# Patient Record
Sex: MALE | Race: BLACK OR AFRICAN AMERICAN | NOT HISPANIC OR LATINO | Employment: OTHER | URBAN - METROPOLITAN AREA
[De-identification: names, ages, dates, MRNs, and addresses within clinical notes are randomized per-mention and may not be internally consistent; named-entity substitution may affect disease eponyms.]

---

## 2018-06-15 ENCOUNTER — TELEPHONE (OUTPATIENT)
Dept: RHEUMATOLOGY | Facility: CLINIC | Age: 63
End: 2018-06-15

## 2018-06-15 NOTE — TELEPHONE ENCOUNTER
----- Message from Cecily Smith sent at 6/15/2018  4:57 PM CDT -----  Contact: self  Patient presented to  to schedule an appt with Dr. Sanford, I was not able to get him an appointment, patient would like a call to have one scheduled for him.    Please call [phone]

## 2018-07-31 ENCOUNTER — TELEPHONE (OUTPATIENT)
Dept: RHEUMATOLOGY | Facility: CLINIC | Age: 63
End: 2018-07-31

## 2018-07-31 NOTE — TELEPHONE ENCOUNTER
----- Message from Anum Gutierrez sent at 7/27/2018  9:57 AM CDT -----  Contact: daughter   Daughter - Renetta John - [phone] is calling to schedule an appt with Dr Sanford/First she said patient has never seen Dr Sanford and when I advised daughter that Dr Sanford was not accepting new patient's at this time she said he just saw her a few weeks ago but I do not show this/please advise

## 2018-07-31 NOTE — TELEPHONE ENCOUNTER
----- Message from Jennifer Dennis sent at 7/30/2018 10:00 AM CDT -----    Pt  Daughter garima  Is calling to  Speak to the  Nurse  Pt daughter  Has  questions // call [phone]

## 2018-09-19 ENCOUNTER — OFFICE VISIT (OUTPATIENT)
Dept: RHEUMATOLOGY | Facility: CLINIC | Age: 63
End: 2018-09-19
Payer: COMMERCIAL

## 2018-09-19 VITALS
BODY MASS INDEX: 27.52 KG/M2 | HEIGHT: 76 IN | WEIGHT: 226 LBS | DIASTOLIC BLOOD PRESSURE: 78 MMHG | SYSTOLIC BLOOD PRESSURE: 125 MMHG | HEART RATE: 87 BPM

## 2018-09-19 DIAGNOSIS — M54.16 LUMBAR RADICULOPATHY: ICD-10-CM

## 2018-09-19 DIAGNOSIS — B02.8 HERPES ZOSTER WITH COMPLICATION: ICD-10-CM

## 2018-09-19 DIAGNOSIS — M47.816 LUMBAR SPONDYLOSIS: ICD-10-CM

## 2018-09-19 DIAGNOSIS — M51.36 DDD (DEGENERATIVE DISC DISEASE), LUMBAR: Primary | ICD-10-CM

## 2018-09-19 PROCEDURE — 3008F BODY MASS INDEX DOCD: CPT | Mod: CPTII,S$GLB,, | Performed by: INTERNAL MEDICINE

## 2018-09-19 PROCEDURE — 96372 THER/PROPH/DIAG INJ SC/IM: CPT | Mod: S$GLB,,, | Performed by: INTERNAL MEDICINE

## 2018-09-19 PROCEDURE — 99999 PR PBB SHADOW E&M-EST. PATIENT-LVL IV: CPT | Mod: PBBFAC,,, | Performed by: INTERNAL MEDICINE

## 2018-09-19 PROCEDURE — 99205 OFFICE O/P NEW HI 60 MIN: CPT | Mod: 25,S$GLB,, | Performed by: INTERNAL MEDICINE

## 2018-09-19 RX ORDER — VALACYCLOVIR HYDROCHLORIDE 1 G/1
1000 TABLET, FILM COATED ORAL 3 TIMES DAILY
Qty: 21 TABLET | Refills: 3 | Status: SHIPPED | OUTPATIENT
Start: 2018-09-19 | End: 2019-02-15

## 2018-09-19 RX ORDER — KETOROLAC TROMETHAMINE 30 MG/ML
60 INJECTION, SOLUTION INTRAMUSCULAR; INTRAVENOUS
Status: COMPLETED | OUTPATIENT
Start: 2018-09-19 | End: 2018-09-19

## 2018-09-19 RX ORDER — ANASTROZOLE 1 MG/1
1 TABLET ORAL
COMMUNITY
Start: 2018-05-02

## 2018-09-19 RX ORDER — MONTELUKAST SODIUM 10 MG/1
10 TABLET ORAL
COMMUNITY
Start: 2017-05-17

## 2018-09-19 RX ORDER — CELECOXIB 200 MG/1
200 CAPSULE ORAL DAILY
Qty: 30 CAPSULE | Refills: 5 | Status: SHIPPED | OUTPATIENT
Start: 2018-09-19 | End: 2018-10-19

## 2018-09-19 RX ORDER — METHYLPREDNISOLONE ACETATE 80 MG/ML
80 INJECTION, SUSPENSION INTRA-ARTICULAR; INTRALESIONAL; INTRAMUSCULAR; SOFT TISSUE
Status: COMPLETED | OUTPATIENT
Start: 2018-09-19 | End: 2018-09-19

## 2018-09-19 RX ORDER — CYCLOBENZAPRINE HCL 5 MG
5 TABLET ORAL
COMMUNITY
Start: 2017-06-20 | End: 2020-07-27

## 2018-09-19 RX ORDER — OMEPRAZOLE 40 MG/1
40 CAPSULE, DELAYED RELEASE ORAL EVERY MORNING
COMMUNITY
Start: 2018-09-07

## 2018-09-19 RX ORDER — TESTOSTERONE CYPIONATE 200 MG/ML
200 INJECTION, SOLUTION INTRAMUSCULAR
COMMUNITY
Start: 2017-03-14 | End: 2021-08-19

## 2018-09-19 RX ORDER — GABAPENTIN 300 MG/1
CAPSULE ORAL
COMMUNITY
Start: 2018-09-10 | End: 2019-02-26

## 2018-09-19 RX ADMIN — KETOROLAC TROMETHAMINE 60 MG: 30 INJECTION, SOLUTION INTRAMUSCULAR; INTRAVENOUS at 12:09

## 2018-09-19 RX ADMIN — METHYLPREDNISOLONE ACETATE 80 MG: 80 INJECTION, SUSPENSION INTRA-ARTICULAR; INTRALESIONAL; INTRAMUSCULAR; SOFT TISSUE at 12:09

## 2018-09-19 NOTE — PROGRESS NOTES
Subjective:       Patient ID: Tera Marquez is a 62 y.o. male.    Chief Complaint: Consult    HPI:  The patient is a 62-year-old man with a history of osteoarthritis lumbar sacral arthritis hip replacement patient has a history of epiglottis hiatus initial history and family history negative for osteoarthritis rheumatoid arthritis inflammatory arthritis family history is not significant social history is not significant except the patient is wife  approximately 6 months ago but only      Disease Management HPI      Osteoarthritis    The disease course has been fluctuating.     He complains of pain, stiffness and joint swelling. The symptoms have been worsening. Affected locations include the neck, left shoulder, right shoulder, left hip, left knee, right hip and right knee. Associated symptoms include pain at night, pain while resting and myalgias. Pertinent negatives include no dysuria, fatigue, fever, trouble swallowing or headaches. He complains of morning stiffness lasting less than 30 minutes after awakening.The neck, left hip and right hip presents with Arthralgia.    Past treatments include NSAIDs. The treatment provided mild relief. Factors aggravating his arthritis include activity.     Review of Systems   Constitutional: Negative for activity change, appetite change, chills, diaphoresis, fatigue, fever and unexpected weight change.   HENT: Negative for congestion, ear pain, facial swelling, mouth sores, nosebleeds, postnasal drip, rhinorrhea, sinus pressure, sneezing, sore throat, tinnitus, trouble swallowing and voice change.    Eyes: Negative for pain, discharge, redness, itching and visual disturbance.   Respiratory: Negative for apnea, cough, chest tightness, shortness of breath and wheezing.    Cardiovascular: Negative for chest pain, palpitations and leg swelling.   Gastrointestinal: Negative for abdominal pain, constipation, diarrhea, nausea and vomiting.   Endocrine: Negative for cold  "intolerance, heat intolerance, polydipsia and polyuria.   Genitourinary: Negative for decreased urine volume, difficulty urinating, dysuria, flank pain, frequency, hematuria and urgency.   Musculoskeletal: Positive for back pain, gait problem, joint swelling, myalgias, neck pain, neck stiffness and stiffness. Negative for arthralgias.   Skin: Positive for rash. Negative for pallor and wound.   Allergic/Immunologic: Negative for immunocompromised state.   Neurological: Negative for dizziness, tremors, seizures, syncope, weakness, numbness and headaches.   Hematological: Negative for adenopathy. Does not bruise/bleed easily.   Psychiatric/Behavioral: Negative for sleep disturbance and suicidal ideas. The patient is not nervous/anxious.          Objective:   /78   Pulse 87   Ht 6' 3.5" (1.918 m)   Wt 102.5 kg (225 lb 15.5 oz)   BMI 27.87 kg/m²      Physical Exam   Vitals reviewed.  Constitutional: He is oriented to person, place, and time and well-developed, well-nourished, and in no distress.   HENT:   Head: Normocephalic and atraumatic.   Mouth/Throat: Oropharynx is clear and moist.   Eyes: EOM are normal. Pupils are equal, round, and reactive to light.   Neck: Neck supple. No thyromegaly present.   Cardiovascular: Normal rate, regular rhythm and normal heart sounds.  Exam reveals no gallop and no friction rub.    No murmur heard.  Pulmonary/Chest: Breath sounds normal. He has no wheezes. He has no rales. He exhibits no tenderness.   Abdominal: There is no tenderness. There is no rebound and no guarding.       Right Side Rheumatological Exam     The patient is tender to palpation of the shoulder, elbow, wrist, knee, 1st PIP, 1st MCP, 2nd PIP, 2nd MCP, 3rd PIP, 3rd MCP, 4th PIP, 4th MCP and 5th PIP    He has swelling of the elbow, wrist, knee, 1st PIP, 1st MCP, 2nd PIP, 2nd MCP, 3rd PIP, 3rd MCP, 4th PIP, 4th MCP, 5th PIP and 5th MCP    The patient has an enlarged wrist and knee    Shoulder Exam   Tenderness " Location: no tenderness    Range of Motion   Active abduction: abnormal   Adduction: abnormal  Sensation: normal    Knee Exam   Tenderness Location: medial joint line and LCL  Patellofemoral Crepitus: positive  Effusion: positive  Sensation: normal    Hip Exam   Tenderness Location: posterior and anterior  Sensation: normal    Elbow/Wrist Exam   Tenderness Location: no tenderness  Sensation: normal    Left Side Rheumatological Exam     The patient is tender to palpation of the shoulder, elbow, wrist, knee, 1st PIP, 1st MCP, 2nd PIP, 2nd MCP, 3rd PIP, 3rd MCP, 4th PIP, 4th MCP, 5th PIP and 5th MCP.    He has swelling of the wrist, knee, 1st PIP, 1st MCP, 2nd PIP, 2nd MCP, 3rd PIP, 3rd MCP, 4th PIP, 4th MCP, 5th PIP and 5th MCP    The patient has an enlarged knee.    Shoulder Exam   Tenderness Location: no tenderness    Range of Motion   Active abduction: abnormal   Sensation: normal    Knee Exam   Tenderness Location: lateral joint line and medial joint line    Patellofemoral Crepitus: positive  Effusion: positive  Sensation: normal    Hip Exam   Tenderness Location: posterior and anterior  Sensation: normal    Elbow/Wrist Exam   Sensation: normal      Back/Neck Exam   General Inspection   Gait: normal       Tenderness Right paramedian tenderness of the Upper C-Spine, Lower C-Spine, Lower L-Spine and SI Joint.Left paramedian tenderness of the Upper C-Spine, Lower L-Spine, Lower C-Spine and SI Joint.    Neck Range of Motion   Flexion: Limited  Extension: Limited  Lymphadenopathy:     He has no cervical adenopathy.   Neurological: He is alert and oriented to person, place, and time. Gait normal.   Skin: No rash noted. No erythema. No pallor.     Psychiatric: Mood and affect normal.   Musculoskeletal: He exhibits tenderness and deformity. He exhibits no edema.           Assessment:       1. DDD (degenerative disc disease), lumbar    2. Lumbar radiculopathy    3. Lumbar spondylosis    4. Herpes zoster with complication             Plan:       Tera was seen today for consult.    Diagnoses and all orders for this visit:    DDD (degenerative disc disease), lumbar  -     IgA; Future  -     IgG; Future  -     IgG 1, 2, 3, and 4; Future  -     IgM; Future  -     WHIT; Future  -     Cyclic citrul peptide antibody, IgG; Future  -     C-reactive protein; Future  -     Sedimentation rate; Future  -     Rheumatoid factor; Future  -     TSH; Future  -     T4, free; Future  -     T3, free; Future  -     Lymphocyte Profile II; Future  -     Varicella zoster antibody, IgG; Future  -     Varicella zoster antibody, IgM; Future  -     IgA  -     IgG  -     IgG 1, 2, 3, and 4  -     IgM  -     WHIT  -     Cyclic citrul peptide antibody, IgG  -     C-reactive protein  -     Sedimentation rate  -     Rheumatoid factor  -     TSH  -     T4, free  -     T3, free  -     Lymphocyte Profile II  -     Varicella zoster antibody, IgG  -     Varicella zoster antibody, IgM  -     methylPREDNISolone acetate injection 80 mg; Inject 1 mL (80 mg total) into the muscle one time.  -     ketorolac injection 60 mg; Inject 2 mLs (60 mg total) into the muscle one time.    Lumbar radiculopathy  -     IgA; Future  -     IgG; Future  -     IgG 1, 2, 3, and 4; Future  -     IgM; Future  -     WHIT; Future  -     Cyclic citrul peptide antibody, IgG; Future  -     C-reactive protein; Future  -     Sedimentation rate; Future  -     Rheumatoid factor; Future  -     TSH; Future  -     T4, free; Future  -     T3, free; Future  -     Lymphocyte Profile II; Future  -     Varicella zoster antibody, IgG; Future  -     Varicella zoster antibody, IgM; Future  -     IgA  -     IgG  -     IgG 1, 2, 3, and 4  -     IgM  -     WHIT  -     Cyclic citrul peptide antibody, IgG  -     C-reactive protein  -     Sedimentation rate  -     Rheumatoid factor  -     TSH  -     T4, free  -     T3, free  -     Lymphocyte Profile II  -     Varicella zoster antibody, IgG  -     Varicella zoster antibody,  IgM  -     methylPREDNISolone acetate injection 80 mg; Inject 1 mL (80 mg total) into the muscle one time.  -     ketorolac injection 60 mg; Inject 2 mLs (60 mg total) into the muscle one time.    Lumbar spondylosis  -     IgA; Future  -     IgG; Future  -     IgG 1, 2, 3, and 4; Future  -     IgM; Future  -     WHIT; Future  -     Cyclic citrul peptide antibody, IgG; Future  -     C-reactive protein; Future  -     Sedimentation rate; Future  -     Rheumatoid factor; Future  -     TSH; Future  -     T4, free; Future  -     T3, free; Future  -     Lymphocyte Profile II; Future  -     Varicella zoster antibody, IgG; Future  -     Varicella zoster antibody, IgM; Future  -     IgA  -     IgG  -     IgG 1, 2, 3, and 4  -     IgM  -     WHIT  -     Cyclic citrul peptide antibody, IgG  -     C-reactive protein  -     Sedimentation rate  -     Rheumatoid factor  -     TSH  -     T4, free  -     T3, free  -     Lymphocyte Profile II  -     Varicella zoster antibody, IgG  -     Varicella zoster antibody, IgM  -     methylPREDNISolone acetate injection 80 mg; Inject 1 mL (80 mg total) into the muscle one time.  -     ketorolac injection 60 mg; Inject 2 mLs (60 mg total) into the muscle one time.    Herpes zoster with complication  -     IgA; Future  -     IgG; Future  -     IgG 1, 2, 3, and 4; Future  -     IgM; Future  -     WHIT; Future  -     Cyclic citrul peptide antibody, IgG; Future  -     C-reactive protein; Future  -     Sedimentation rate; Future  -     Rheumatoid factor; Future  -     TSH; Future  -     T4, free; Future  -     T3, free; Future  -     Lymphocyte Profile II; Future  -     Varicella zoster antibody, IgG; Future  -     Varicella zoster antibody, IgM; Future  -     IgA  -     IgG  -     IgG 1, 2, 3, and 4  -     IgM  -     WHIT  -     Cyclic citrul peptide antibody, IgG  -     C-reactive protein  -     Sedimentation rate  -     Rheumatoid factor  -     TSH  -     T4, free  -     T3, free  -     Lymphocyte  Profile II  -     Varicella zoster antibody, IgG  -     Varicella zoster antibody, IgM  -     methylPREDNISolone acetate injection 80 mg; Inject 1 mL (80 mg total) into the muscle one time.  -     ketorolac injection 60 mg; Inject 2 mLs (60 mg total) into the muscle one time.    Other orders  -     celecoxib (CELEBREX) 200 MG capsule; Take 1 capsule (200 mg total) by mouth once daily.  -     valACYclovir (VALTREX) 1000 MG tablet; Take 1 tablet (1,000 mg total) by mouth 3 (three) times daily.

## 2018-09-19 NOTE — PROGRESS NOTES
Administered 1 cc ( 80 mg/ml ) of depomedrol to the right upper outer gluteal. Informed of s/s to report verbalized understanding. No adverse reactions noted.    Lot # [id number]  Expiration 08/19    Administered 2 cc ( 30 mg/ml ) of toradol to the left upper outer gluteal. Informed of s/s to report verbalized understanding. No adverse reactions noted.    Lot # [id number]  Expiration 1 dec 2019

## 2018-09-25 ENCOUNTER — TELEPHONE (OUTPATIENT)
Dept: RHEUMATOLOGY | Facility: CLINIC | Age: 63
End: 2018-09-25

## 2018-09-25 DIAGNOSIS — M51.36 DDD (DEGENERATIVE DISC DISEASE), LUMBAR: Primary | ICD-10-CM

## 2018-09-25 DIAGNOSIS — M54.16 LUMBAR RADICULOPATHY: ICD-10-CM

## 2018-09-25 DIAGNOSIS — M47.816 LUMBAR SPONDYLOSIS: ICD-10-CM

## 2018-09-25 NOTE — TELEPHONE ENCOUNTER
Clarified lab order for lymphocyte panel. Needed it ordered for [facility] send out [id number] the test that was previously ordered is no longer available.

## 2018-09-25 NOTE — TELEPHONE ENCOUNTER
----- Message from Jerald Pabon sent at 9/25/2018  9:42 AM CDT -----  Type: Needs Medical Advice    Who Called:  [place] [facility]  Best Call Back Number: [phone]  Additional Information: Need order clarified

## 2019-02-26 ENCOUNTER — OFFICE VISIT (OUTPATIENT)
Dept: RHEUMATOLOGY | Facility: CLINIC | Age: 64
End: 2019-02-26
Payer: COMMERCIAL

## 2019-02-26 VITALS
HEIGHT: 75 IN | SYSTOLIC BLOOD PRESSURE: 138 MMHG | HEART RATE: 86 BPM | BODY MASS INDEX: 28.66 KG/M2 | DIASTOLIC BLOOD PRESSURE: 83 MMHG | WEIGHT: 230.5 LBS

## 2019-02-26 DIAGNOSIS — M51.36 DDD (DEGENERATIVE DISC DISEASE), LUMBAR: Primary | ICD-10-CM

## 2019-02-26 DIAGNOSIS — M47.816 LUMBAR SPONDYLOSIS: ICD-10-CM

## 2019-02-26 DIAGNOSIS — M54.16 LUMBAR RADICULOPATHY: ICD-10-CM

## 2019-02-26 PROCEDURE — 96372 PR INJECTION,THERAP/PROPH/DIAG2ST, IM OR SUBCUT: ICD-10-PCS | Mod: S$GLB,,, | Performed by: INTERNAL MEDICINE

## 2019-02-26 PROCEDURE — 3008F BODY MASS INDEX DOCD: CPT | Mod: CPTII,S$GLB,, | Performed by: INTERNAL MEDICINE

## 2019-02-26 PROCEDURE — 3008F PR BODY MASS INDEX (BMI) DOCUMENTED: ICD-10-PCS | Mod: CPTII,S$GLB,, | Performed by: INTERNAL MEDICINE

## 2019-02-26 PROCEDURE — 99999 PR PBB SHADOW E&M-EST. PATIENT-LVL III: CPT | Mod: PBBFAC,,, | Performed by: INTERNAL MEDICINE

## 2019-02-26 PROCEDURE — 96372 THER/PROPH/DIAG INJ SC/IM: CPT | Mod: S$GLB,,, | Performed by: INTERNAL MEDICINE

## 2019-02-26 PROCEDURE — 99999 PR PBB SHADOW E&M-EST. PATIENT-LVL III: ICD-10-PCS | Mod: PBBFAC,,, | Performed by: INTERNAL MEDICINE

## 2019-02-26 PROCEDURE — 99214 PR OFFICE/OUTPT VISIT, EST, LEVL IV, 30-39 MIN: ICD-10-PCS | Mod: 25,S$GLB,, | Performed by: INTERNAL MEDICINE

## 2019-02-26 PROCEDURE — 99214 OFFICE O/P EST MOD 30 MIN: CPT | Mod: 25,S$GLB,, | Performed by: INTERNAL MEDICINE

## 2019-02-26 RX ORDER — METHYLPREDNISOLONE ACETATE 80 MG/ML
160 INJECTION, SUSPENSION INTRA-ARTICULAR; INTRALESIONAL; INTRAMUSCULAR; SOFT TISSUE
Status: DISCONTINUED | OUTPATIENT
Start: 2019-02-26 | End: 2019-02-26

## 2019-02-26 RX ORDER — KETOROLAC TROMETHAMINE 30 MG/ML
60 INJECTION, SOLUTION INTRAMUSCULAR; INTRAVENOUS
Status: COMPLETED | OUTPATIENT
Start: 2019-02-26 | End: 2019-02-26

## 2019-02-26 RX ORDER — ETODOLAC 500 MG/1
500 TABLET, FILM COATED ORAL 2 TIMES DAILY
Qty: 60 TABLET | Refills: 6 | Status: SHIPPED | OUTPATIENT
Start: 2019-02-26 | End: 2020-01-21

## 2019-02-26 RX ORDER — METHYLPREDNISOLONE ACETATE 80 MG/ML
80 INJECTION, SUSPENSION INTRA-ARTICULAR; INTRALESIONAL; INTRAMUSCULAR; SOFT TISSUE
Status: COMPLETED | OUTPATIENT
Start: 2019-02-26 | End: 2019-02-26

## 2019-02-26 RX ADMIN — METHYLPREDNISOLONE ACETATE 80 MG: 80 INJECTION, SUSPENSION INTRA-ARTICULAR; INTRALESIONAL; INTRAMUSCULAR; SOFT TISSUE at 12:02

## 2019-02-26 RX ADMIN — KETOROLAC TROMETHAMINE 60 MG: 30 INJECTION, SOLUTION INTRAMUSCULAR; INTRAVENOUS at 12:02

## 2019-02-26 ASSESSMENT — ROUTINE ASSESSMENT OF PATIENT INDEX DATA (RAPID3)
PATIENT GLOBAL ASSESSMENT SCORE: 5.5
PAIN SCORE: 5.5
PSYCHOLOGICAL DISTRESS SCORE: 2.2
TOTAL RAPID3 SCORE: 4.78
MDHAQ FUNCTION SCORE: 1

## 2019-02-26 NOTE — PROGRESS NOTES
"Subjective:       Patient ID: Tera Marquez is a 63 y.o. male.    Chief Complaint: Follow-up    HPI:  The patient is a 62-year-old man with a history of osteoarthritis lumbar sacral arthritis hip replacement patient has a history of epiglottis hiatus initial history and family history negative for osteoarthritis rheumatoid arthritis inflammatory arthritis family history is not significant social history is not significant except the patient is wife  approximately 6 months ago but only      Review of Systems   Constitutional: Negative for activity change, appetite change, chills, diaphoresis and unexpected weight change.   HENT: Negative for congestion, ear pain, facial swelling, mouth sores, nosebleeds, postnasal drip, rhinorrhea, sinus pressure, sneezing, sore throat, tinnitus and voice change.    Eyes: Negative for pain, discharge, redness, itching and visual disturbance.   Respiratory: Negative for apnea, cough, chest tightness, shortness of breath and wheezing.    Cardiovascular: Negative for chest pain, palpitations and leg swelling.   Gastrointestinal: Negative for abdominal pain, constipation, diarrhea, nausea and vomiting.   Endocrine: Negative for cold intolerance, heat intolerance, polydipsia and polyuria.   Genitourinary: Negative for decreased urine volume, difficulty urinating, flank pain, frequency, hematuria and urgency.   Musculoskeletal: Positive for back pain, gait problem, neck pain and neck stiffness. Negative for arthralgias.   Skin: Positive for rash. Negative for pallor and wound.   Allergic/Immunologic: Negative for immunocompromised state.   Neurological: Negative for dizziness, tremors, seizures, syncope, weakness and numbness.   Hematological: Negative for adenopathy. Does not bruise/bleed easily.   Psychiatric/Behavioral: Negative for sleep disturbance and suicidal ideas. The patient is not nervous/anxious.          Objective:   /83   Pulse 86   Ht 6' 3" (1.905 m)   Wt 104.5 " kg (230 lb 7.9 oz)   BMI 28.81 kg/m²      Physical Exam   Vitals reviewed.  Constitutional: He is oriented to person, place, and time and well-developed, well-nourished, and in no distress.   HENT:   Head: Normocephalic and atraumatic.   Mouth/Throat: Oropharynx is clear and moist.   Eyes: EOM are normal. Pupils are equal, round, and reactive to light.   Neck: Neck supple. No thyromegaly present.   Cardiovascular: Normal rate, regular rhythm and normal heart sounds.  Exam reveals no gallop and no friction rub.    No murmur heard.  Pulmonary/Chest: Breath sounds normal. He has no wheezes. He has no rales. He exhibits no tenderness.   Abdominal: There is no tenderness. There is no rebound and no guarding.       Right Side Rheumatological Exam     The patient is tender to palpation of the shoulder, elbow, wrist, knee, 1st PIP, 1st MCP, 2nd PIP, 2nd MCP, 3rd PIP, 3rd MCP, 4th PIP, 4th MCP and 5th PIP    He has swelling of the elbow, wrist, knee, 1st PIP, 1st MCP, 2nd PIP, 2nd MCP, 3rd PIP, 3rd MCP, 4th PIP, 4th MCP, 5th PIP and 5th MCP    The patient has an enlarged wrist and knee    Shoulder Exam   Tenderness Location: no tenderness    Range of Motion   Active abduction: abnormal   Adduction: abnormal  Sensation: normal    Knee Exam   Tenderness Location: medial joint line and LCL  Patellofemoral Crepitus: positive  Effusion: positive  Sensation: normal    Hip Exam   Tenderness Location: posterior and anterior  Sensation: normal    Elbow/Wrist Exam   Tenderness Location: no tenderness  Sensation: normal    Left Side Rheumatological Exam     The patient is tender to palpation of the shoulder, elbow, wrist, knee, 1st PIP, 1st MCP, 2nd PIP, 2nd MCP, 3rd PIP, 3rd MCP, 4th PIP, 4th MCP, 5th PIP and 5th MCP.    He has swelling of the wrist, knee, 1st PIP, 1st MCP, 2nd PIP, 2nd MCP, 3rd PIP, 3rd MCP, 4th PIP, 4th MCP, 5th PIP and 5th MCP    The patient has an enlarged knee.    Shoulder Exam   Tenderness Location: no  tenderness    Range of Motion   Active abduction: abnormal   Sensation: normal    Knee Exam   Tenderness Location: lateral joint line and medial joint line    Patellofemoral Crepitus: positive  Effusion: positive  Sensation: normal    Hip Exam   Tenderness Location: posterior and anterior  Sensation: normal    Elbow/Wrist Exam   Sensation: normal      Back/Neck Exam   General Inspection   Gait: normal       Tenderness Right paramedian tenderness of the Upper C-Spine, Lower C-Spine, Lower L-Spine and SI Joint.Left paramedian tenderness of the Upper C-Spine, Lower L-Spine, Lower C-Spine and SI Joint.    Neck Range of Motion   Flexion: Limited  Extension: Limited  Lymphadenopathy:     He has no cervical adenopathy.   Neurological: He is alert and oriented to person, place, and time. Gait normal.   Skin: No rash noted. No erythema. No pallor.     Psychiatric: Mood and affect normal.   Musculoskeletal: He exhibits tenderness and deformity. He exhibits no edema.           Performing Organization Address City/[facility] Phone Number   [place]             Back to top of Lab Results       Sedimentation rate, automated (09/25/2018 10:38 AM CDT)  Sedimentation rate, automated (09/25/2018 10:38 AM CDT)   Component Value Ref Range Performed At [facility]   Sed Rate 1 0 - 20         Sedimentation rate, automated (09/25/2018 10:38 AM CDT)   Specimen   Blood - Blood     Sedimentation rate, automated (09/25/2018 10:38 AM CDT)   Performing Organization Address City/[facility] Phone Number   [place]             Back to top of Lab Results       Cyclic citrul peptide antibody, IgG (09/25/2018 10:38 AM CDT)  Cyclic citrul peptide antibody, IgG (09/25/2018 10:38 AM CDT)   Component Value Ref Range Performed At [facility]   Test Requested see belowComment: CYCLIC CITRULLINE PEPTIDE (CCP) IGG         CCP, Ab IGG <16  Comment:   Test Performed By:  [facility]                      [place], [place]   NEGATIVE:` <20`WEAK POSITIVE:` 20-39`MODERATE POSITIVE:` 40-59`STRONG POSITIVE` >59         Cyclic citrul peptide antibody, IgG (09/25/2018 10:38 AM CDT)   Specimen   Blood - Blood     Cyclic citrul peptide antibody, IgG (09/25/2018 10:38 AM CDT)   Performing Organization Address City/[facility] Phone Number   [place]             Back to top of Lab Results       WHIT (09/25/2018 10:38 AM CDT)  WHIT (09/25/2018 10:38 AM CDT)   Component Value Ref Range Performed At [facility]   Test Requested see belowComment: ANTINUCLEAR ANTIBODY, TITER AND PATTERN NEGATIVE       WHIT NEGATIVE  Comment:   WHIT IFA is a first line screen for detecting the presence of  up to approximately 150 autoantibodies in various autoimmune  diseases. A negative WHIT IFA result suggests WHIT-associated  autoimmune diseases are not present at this time.  Visit Physician FAQs for interpretation of all antibodies in  the [place], prevalence, and association with diseases at  http://education.[employer]/faq/CHH738   NEGATIVE       WHIT Titer SEE BELOW  Comment:   Test Not Performed. Reflex testing not required.  Reference Ranges for Anti-Nuclear Ab Titer:     <1:40      Negative     1:40-1:80  Low Antibody Level     >1:80      Elevated Antibody Level           WHIT Pattern SEE BELOW  Comment:   Test Not Performed. Reflex testing not required.  Test Performed By:  [employer]             WHIT (09/25/2018 10:38 AM CDT)   Specimen   Blood - Blood     WHIT (09/25/2018 10:38 AM CDT)   Performing Organization Address City/State/[facility] Phone Number   [place]             Back to top of Lab Results       Varicella zoster antibody, IgG (09/25/2018 10:37 AM CDT)  Varicella zoster antibody, IgG (09/25/2018 10:37 AM CDT)   Component Value Ref Range Performed At [facility]   Test Requested see belowComment: VARICELLA ZOSTER VIRUS ANTIBODY  (IgG)         Varicella Zoster Virus Ab, IgG 1,167.00 (H)  Comment:   Index              Interpretation  <135.00           Negative - Antibody not detected    135.00 - 164.99  Equivocal  >=165.00           Positive - Antibody detected  A positive result indicates that the patient has  antibody to VZV but does not differentiate between  an active or past infection. The clinical diagnosis  must be interpreted in conjunction with the clinical  signs and symptoms of the patient. This assay reliably  measures immunity due to previous infection but  may not be sensitive enough to detect antibodies  induced by vaccination. Thus, a negative result in  a vaccinated individual does not necessarily  indicate susceptibility to VZV infection.       This test performed at:       [facility]       [id number] [place] [id number]       Director: LINDA Das MD  Test Performed By:  [place]             Varicella zoster antibody, IgG (09/25/2018 10:37 AM CDT)   Specimen   Blood - Blood     Varicella zoster antibody, IgG (09/25/2018 10:37 AM CDT)   Performing Organization Address City/[facility] Number   [place]             Back to top of Lab Results       T3, free (09/25/2018 10:37 AM CDT)  T3, free (09/25/2018 10:37 AM CDT)   Component Value Ref Range Performed At [facility]   Free T3 7.78 (H)  Comment:   Reference Range changed due to Reagent formulation design  change by Vendor.   2.90 - 4.20 pg/mL         T3, free (09/25/2018 10:37 AM CDT)   Specimen   Blood - Blood     T3, free (09/25/2018 10:37 AM CDT)   Performing Organization Address City/[facility] Number   [place]             Back to top of Lab Results       T4, free (09/25/2018 10:37 AM CDT)  T4, free (09/25/2018 10:37 AM CDT)   Component Value Ref Range Performed At [facility]   T4, Free 1.18  0.58 - 1.34 ng/dL         T4, free (09/25/2018 10:37 AM CDT)   Specimen   Blood - Blood     T4, free (09/25/2018 10:37 AM CDT)   Performing Organization Address City/[facility] Phone Number   [place]             Back to top of Lab Results       TSH (09/25/2018 10:37 AM CDT)  TSH (09/25/2018 10:37 AM CDT)   Component Value Ref Range Performed At [facility]   TSH 1.08 0.34 - 5.60         TSH (09/25/2018 10:37 AM CDT)   Specimen   Blood - Blood     TSH (09/25/2018 10:37 AM CDT)   Performing Organization Address City/State/[facility] Phone Number   [place]             Back to top of Lab Results       RA Latex (09/25/2018 10:37 AM CDT)  RA Latex (09/25/2018 10:37 AM CDT)   Component Value Ref Range Performed At [facility]   RA Latex NEGATIVE NEGATIVE         RA Latex (09/25/2018 10:37 AM CDT)   Specimen   Blood - Blood     RA Latex (09/25/2018 10:37 AM CDT)   Performing Organization Address City/State/Zipcode Phone Number   [place]             Back to top of Lab Results       C-reactive protein (09/25/2018 10:37 AM CDT)  C-reactive protein (09/25/2018 10:37 AM CDT)   Component Value Ref Range Performed At [facility]   CRP <7.0 0.0 - 9.9 mg/L         C-reactive protein (09/25/2018 10:37 AM CDT)   Specimen   Blood - Blood     C-reactive protein (09/25/2018 10:37 AM CDT)   Performing Organization Address City/[place]/[facility] Phone Number   [place]             Back to top of Lab Results       IgA (09/25/2018 10:37 AM CDT)  IgA (09/25/2018 10:37 AM CDT)   Component Value Ref Range Performed At [facility]   IgA 192 66 - 436 mg/dL         IgA (09/25/2018 10:37 AM CDT)   Specimen   Blood - Blood     IgA (09/25/2018 10:37 AM CDT)   Performing Organization Address City/[place]/[facility] Phone Number   [place]             Back to top of Lab Results       IgM (09/25/2018 10:37 AM CDT)  IgM (09/25/2018 10:37 AM CDT)   Component Value Ref Range Performed At [facility]    IgM 128 43 - 279 mg/dL         IgM (09/25/2018 10:37 AM CDT)   Specimen   Blood - Blood     IgM (09/25/2018 10:37 AM CDT)   Performing Organization Address City/[facility] Phone Number   [place]             Back to top of Lab Results       IgG (09/25/2018 10:37 AM CDT)  IgG (09/25/2018 10:37 AM CDT)   Component Value Ref Range Performed At [facility]   IgG =1,009 791 - 1643 mg/dL         IgG (09/25/2018 10:37 AM CDT)   Specimen   Blood - Blood     IgG (09/25/2018 10:37 AM CDT)   Performing Organization Address City/[facility] Phone Number   [place]             Back to top of Lab Results       IgG 1, 2, 3, and 4 (09/25/2018 10:37 AM CDT)  IgG 1, 2, 3, and 4 (09/25/2018 10:37 AM CDT)   Component Value Ref Range Performed At [facility]   Test Requested see belowComment: IgG SUBCLASSES         IgG Subclass 1 548 382 - 929 mg/dL       IgG Subclass 2 443 241 - 700 mg/dL       IgG Subclass 3 73 22 - 178 mg/dL       IgG subclass 4 49.5 4 - 86 mg/dL       IgG, Serum 1,125  Comment:   Test Performed By:  [place]   694 - 1,618 mg/dL         IgG 1, 2, 3, and 4 (09/25/2018 10:37 AM CDT)   Specimen   Blood - Blood     IgG 1, 2, 3, and 4 (09/25/2018 10:37 AM CDT)   Performing Organization Address City/State/[facility] Phone Number   [place]             Back to top of Lab Results       Lab Misc. (09/25/2018 10:32 AM CDT)  Lab Misc. (09/25/2018 10:32 AM CDT)   Component Value Ref Range Performed At [facility]   Test Requested LYMPHOCYTE PANE         Lab Misc SEE REPT  Comment:   Test Performed By:  [facility] [place]             Lab Misc. (09/25/2018 10:32 AM CDT)   Narrative Performed At   This result has an attachment that is not available.              Lab Misc. (09/25/2018 10:32 AM CDT)   Performing Organization Address City/[facility]  [facility]             Back to top of Lab Results      Visit Diagnoses  - documented in this encounter    Assessment:       1. DDD (degenerative disc disease), lumbar    2. Lumbar radiculopathy    3. Lumbar spondylosis            Plan:       Tera was seen today for follow-up.    Diagnoses and all orders for this visit:    DDD (degenerative disc disease), lumbar  -     ketorolac injection 60 mg  -     methylPREDNISolone acetate injection 160 mg  -     CBC auto differential; Future  -     Comprehensive metabolic panel; Future  -     Sedimentation rate; Future  -     C-reactive protein; Future  -     CBC auto differential  -     Comprehensive metabolic panel  -     Sedimentation rate  -     C-reactive protein    Lumbar radiculopathy  -     ketorolac injection 60 mg  -     methylPREDNISolone acetate injection 160 mg  -     CBC auto differential; Future  -     Comprehensive metabolic panel; Future  -     Sedimentation rate; Future  -     C-reactive protein; Future  -     CBC auto differential  -     Comprehensive metabolic panel  -     Sedimentation rate  -     C-reactive protein    Lumbar spondylosis  -     ketorolac injection 60 mg  -     methylPREDNISolone acetate injection 160 mg  -     CBC auto differential; Future  -     Comprehensive metabolic panel; Future  -     Sedimentation rate; Future  -     C-reactive protein; Future  -     CBC auto differential  -     Comprehensive metabolic panel  -     Sedimentation rate  -     C-reactive protein    Other orders  -     etodolac (LODINE) 500 MG tablet; Take 1 tablet (500 mg total) by mouth 2 (two) times daily.     we will dc celebrex add lodine  500 mg bid and discussed arthritis

## 2019-02-26 NOTE — PROGRESS NOTES
Administered 2 cc ( 30 mg/ml ) of toradol to the left upper outer gluteal. Informed of s/s to report verbalized understanding. No adverse reactions noted.    Lot # -[id number]  Expiration 1 mar 2020    Administered 1 cc ( 80 mg/ml ) of depomedrol to the right upper outer gluteal. Informed of s/s to report verbalized understanding. No adverse reactions noted.    Lot # [id number]  Expiration 03/20

## 2019-07-08 ENCOUNTER — DOCUMENTATION ONLY (OUTPATIENT)
Dept: RHEUMATOLOGY | Facility: CLINIC | Age: 64
End: 2019-07-08

## 2019-07-08 NOTE — PROGRESS NOTES
Received lab results from [facility]. Dr Sanford has reviewed no change in treatment at this time. Results sent to scan

## 2019-08-07 ENCOUNTER — OFFICE VISIT (OUTPATIENT)
Dept: RHEUMATOLOGY | Facility: CLINIC | Age: 64
End: 2019-08-07
Payer: COMMERCIAL

## 2019-08-07 VITALS
DIASTOLIC BLOOD PRESSURE: 80 MMHG | HEART RATE: 80 BPM | HEIGHT: 75 IN | WEIGHT: 227 LBS | BODY MASS INDEX: 28.23 KG/M2 | SYSTOLIC BLOOD PRESSURE: 146 MMHG

## 2019-08-07 DIAGNOSIS — M54.16 LUMBAR RADICULOPATHY: Primary | ICD-10-CM

## 2019-08-07 DIAGNOSIS — M51.36 DDD (DEGENERATIVE DISC DISEASE), LUMBAR: ICD-10-CM

## 2019-08-07 DIAGNOSIS — M25.552 PAIN OF LEFT HIP JOINT: ICD-10-CM

## 2019-08-07 DIAGNOSIS — M47.816 LUMBAR SPONDYLOSIS: ICD-10-CM

## 2019-08-07 PROCEDURE — 99999 PR PBB SHADOW E&M-EST. PATIENT-LVL III: ICD-10-PCS | Mod: PBBFAC,,, | Performed by: INTERNAL MEDICINE

## 2019-08-07 PROCEDURE — 96372 THER/PROPH/DIAG INJ SC/IM: CPT | Mod: S$GLB,,, | Performed by: INTERNAL MEDICINE

## 2019-08-07 PROCEDURE — 3008F BODY MASS INDEX DOCD: CPT | Mod: CPTII,S$GLB,, | Performed by: INTERNAL MEDICINE

## 2019-08-07 PROCEDURE — 3008F PR BODY MASS INDEX (BMI) DOCUMENTED: ICD-10-PCS | Mod: CPTII,S$GLB,, | Performed by: INTERNAL MEDICINE

## 2019-08-07 PROCEDURE — 96372 PR INJECTION,THERAP/PROPH/DIAG2ST, IM OR SUBCUT: ICD-10-PCS | Mod: S$GLB,,, | Performed by: INTERNAL MEDICINE

## 2019-08-07 PROCEDURE — 99215 PR OFFICE/OUTPT VISIT, EST, LEVL V, 40-54 MIN: ICD-10-PCS | Mod: 25,S$GLB,, | Performed by: INTERNAL MEDICINE

## 2019-08-07 PROCEDURE — 99215 OFFICE O/P EST HI 40 MIN: CPT | Mod: 25,S$GLB,, | Performed by: INTERNAL MEDICINE

## 2019-08-07 PROCEDURE — 99999 PR PBB SHADOW E&M-EST. PATIENT-LVL III: CPT | Mod: PBBFAC,,, | Performed by: INTERNAL MEDICINE

## 2019-08-07 RX ORDER — METHYLPREDNISOLONE ACETATE 80 MG/ML
160 INJECTION, SUSPENSION INTRA-ARTICULAR; INTRALESIONAL; INTRAMUSCULAR; SOFT TISSUE
Status: DISCONTINUED | OUTPATIENT
Start: 2019-08-07 | End: 2019-08-07

## 2019-08-07 RX ORDER — GLYCOPYRROLATE 1 MG/1
TABLET ORAL
Refills: 2 | COMMUNITY
Start: 2019-07-23 | End: 2020-07-27

## 2019-08-07 RX ORDER — TESTOSTERONE ENANTHATE 100 MG/.5ML
INJECTION SUBCUTANEOUS
COMMUNITY
Start: 2019-08-06 | End: 2020-01-21

## 2019-08-07 RX ORDER — METAXALONE 800 MG/1
800 TABLET ORAL
COMMUNITY
Start: 2019-07-25 | End: 2020-01-21

## 2019-08-07 RX ORDER — GABAPENTIN 300 MG/1
CAPSULE ORAL
COMMUNITY
Start: 2019-08-06 | End: 2019-08-07 | Stop reason: SDUPTHER

## 2019-08-07 RX ORDER — KETOROLAC TROMETHAMINE 30 MG/ML
60 INJECTION, SOLUTION INTRAMUSCULAR; INTRAVENOUS
Status: COMPLETED | OUTPATIENT
Start: 2019-08-07 | End: 2019-08-07

## 2019-08-07 RX ORDER — GABAPENTIN 300 MG/1
CAPSULE ORAL
COMMUNITY
Start: 2019-06-03 | End: 2020-01-21

## 2019-08-07 RX ORDER — PREDNISONE 10 MG/1
TABLET ORAL
COMMUNITY
Start: 2019-05-15 | End: 2020-01-21

## 2019-08-07 RX ORDER — HYDROXYZINE PAMOATE 50 MG/1
50 CAPSULE ORAL NIGHTLY
Refills: 0 | COMMUNITY
Start: 2019-07-10 | End: 2020-01-21

## 2019-08-07 RX ORDER — CLINDAMYCIN HYDROCHLORIDE 300 MG/1
300 CAPSULE ORAL 3 TIMES DAILY
Refills: 0 | COMMUNITY
Start: 2019-05-15 | End: 2019-08-07 | Stop reason: ALTCHOICE

## 2019-08-07 RX ORDER — FLUTICASONE PROPIONATE 50 MCG
SPRAY, SUSPENSION (ML) NASAL
COMMUNITY

## 2019-08-07 RX ORDER — PREDNISONE 10 MG/1
TABLET ORAL
Refills: 0 | COMMUNITY
Start: 2019-05-15 | End: 2019-08-07 | Stop reason: SDUPTHER

## 2019-08-07 RX ORDER — PRAVASTATIN SODIUM 20 MG/1
20 TABLET ORAL NIGHTLY
Refills: 3 | COMMUNITY
Start: 2019-07-10 | End: 2020-01-21

## 2019-08-07 RX ADMIN — KETOROLAC TROMETHAMINE 60 MG: 30 INJECTION, SOLUTION INTRAMUSCULAR; INTRAVENOUS at 12:08

## 2019-08-07 ASSESSMENT — ROUTINE ASSESSMENT OF PATIENT INDEX DATA (RAPID3)
PSYCHOLOGICAL DISTRESS SCORE: 0
TOTAL RAPID3 SCORE: 5.39
PAIN SCORE: 8
MDHAQ FUNCTION SCORE: .8
PATIENT GLOBAL ASSESSMENT SCORE: 5.5

## 2019-08-07 NOTE — PROGRESS NOTES
Administered 2 cc  Toradol 30mg/cc  to right upper outer gluteal. Pt tolerated well. No acute reaction noted to site. Pt instructed on S/S to report. Advised patient to wait in lobby 15 minutes after receiving injection to monitor for any reactions. Pt verbalized understanding.     Lot: -[id number]  Exp: [id number]

## 2019-08-07 NOTE — PROGRESS NOTES
"Subjective:       Patient ID: Tera Marquez is a 63 y.o. male.    Chief Complaint: Disease Management (DDD)    Follow up: 64 yo male with lumbar sacral arthritis, he is schedule for spine fusion, B hips pain R hip is pain fusion and B foot pain.    Review of Systems   Constitutional: Negative for activity change, appetite change, chills, diaphoresis and unexpected weight change.   HENT: Negative for congestion, ear pain, facial swelling, mouth sores, nosebleeds, postnasal drip, rhinorrhea, sinus pressure, sneezing, sore throat, tinnitus and voice change.    Eyes: Negative for pain, discharge, redness, itching and visual disturbance.   Respiratory: Negative for apnea, cough, chest tightness, shortness of breath and wheezing.    Cardiovascular: Negative for chest pain, palpitations and leg swelling.   Gastrointestinal: Negative for abdominal pain, constipation, diarrhea, nausea and vomiting.   Endocrine: Negative for cold intolerance, heat intolerance, polydipsia and polyuria.   Genitourinary: Negative for decreased urine volume, difficulty urinating, flank pain, frequency, hematuria and urgency.   Musculoskeletal: Positive for arthralgias, back pain, gait problem, neck pain and neck stiffness.   Skin: Positive for rash. Negative for pallor and wound.   Allergic/Immunologic: Negative for immunocompromised state.   Neurological: Negative for dizziness, tremors, seizures, syncope, weakness and numbness.   Hematological: Negative for adenopathy. Does not bruise/bleed easily.   Psychiatric/Behavioral: Negative for sleep disturbance and suicidal ideas. The patient is not nervous/anxious.          Objective:   BP (!) 146/80 (BP Location: Left arm, Patient Position: Sitting, BP Method: Small (Automatic))   Pulse 80   Ht 6' 3" (1.905 m)   Wt 103 kg (227 lb)   BMI 28.37 kg/m²      Physical Exam   Vitals reviewed.  Constitutional: He is oriented to person, place, and time and well-developed, well-nourished, and in no " distress.   HENT:   Head: Normocephalic and atraumatic.   Mouth/Throat: Oropharynx is clear and moist.   Eyes: EOM are normal. Pupils are equal, round, and reactive to light.   Neck: Neck supple. No thyromegaly present.   Cardiovascular: Normal rate, regular rhythm and normal heart sounds.  Exam reveals no gallop and no friction rub.    No murmur heard.  Pulmonary/Chest: Breath sounds normal. He has no wheezes. He has no rales. He exhibits no tenderness.   Abdominal: There is no tenderness. There is no rebound and no guarding.       Right Side Rheumatological Exam     The patient is tender to palpation of the shoulder, elbow, wrist, knee, 1st PIP, 1st MCP, 2nd PIP, 2nd MCP, 3rd PIP, 3rd MCP, 4th PIP, 4th MCP and 5th PIP    He has swelling of the elbow, wrist, knee, 1st PIP, 1st MCP, 2nd PIP, 2nd MCP, 3rd PIP, 3rd MCP, 4th PIP, 4th MCP, 5th PIP and 5th MCP    The patient has an enlarged wrist and knee    Shoulder Exam   Tenderness Location: no tenderness    Range of Motion   Active abduction: abnormal   Adduction: abnormal  Sensation: normal    Knee Exam   Tenderness Location: medial joint line and LCL  Patellofemoral Crepitus: positive  Effusion: positive  Sensation: normal    Hip Exam   Tenderness Location: posterior and anterior  Sensation: normal    Elbow/Wrist Exam   Tenderness Location: no tenderness  Sensation: normal    Left Side Rheumatological Exam     The patient is tender to palpation of the shoulder, elbow, wrist, knee, 1st PIP, 1st MCP, 2nd PIP, 2nd MCP, 3rd PIP, 3rd MCP, 4th PIP, 4th MCP, 5th PIP and 5th MCP.    He has swelling of the wrist, knee, 1st PIP, 1st MCP, 2nd PIP, 2nd MCP, 3rd PIP, 3rd MCP, 4th PIP, 4th MCP, 5th PIP and 5th MCP    The patient has an enlarged knee.    Shoulder Exam   Tenderness Location: no tenderness    Range of Motion   Active abduction: abnormal   Sensation: normal    Knee Exam   Tenderness Location: lateral joint line and medial joint line    Patellofemoral Crepitus:  positive  Effusion: positive  Sensation: normal    Hip Exam   Tenderness Location: posterior and anterior  Sensation: normal    Elbow/Wrist Exam   Sensation: normal      Back/Neck Exam   General Inspection   Gait: normal       Tenderness Right paramedian tenderness of the Upper C-Spine, Lower C-Spine, Lower L-Spine and SI Joint.Left paramedian tenderness of the Upper C-Spine, Lower L-Spine, Lower C-Spine and SI Joint.    Neck Range of Motion   Flexion: Limited  Extension: Limited  Lymphadenopathy:     He has no cervical adenopathy.   Neurological: He is alert and oriented to person, place, and time. Gait normal.   Skin: No rash noted. No erythema. No pallor.     Psychiatric: Mood and affect normal.   Musculoskeletal: He exhibits tenderness and deformity. He exhibits no edema.                 Assessment:       1. Lumbar radiculopathy    2. Lumbar spondylosis    3. DDD (degenerative disc disease), lumbar    4. Pain of left hip joint            Plan:       Tera was seen today for disease management.    Diagnoses and all orders for this visit:    Lumbar radiculopathy  -     Cancel: Ambulatory consult to Orthopedics  -     ketorolac injection 60 mg  -     Discontinue: methylPREDNISolone acetate injection 160 mg  -     CBC auto differential; Future  -     Comprehensive metabolic panel; Future  -     C-reactive protein; Future  -     Sedimentation rate; Future    Lumbar spondylosis  -     Cancel: Ambulatory consult to Orthopedics  -     ketorolac injection 60 mg  -     Discontinue: methylPREDNISolone acetate injection 160 mg  -     CBC auto differential; Future  -     Comprehensive metabolic panel; Future  -     C-reactive protein; Future  -     Sedimentation rate; Future    DDD (degenerative disc disease), lumbar  -     ketorolac injection 60 mg  -     Discontinue: methylPREDNISolone acetate injection 160 mg  -     CBC auto differential; Future  -     Comprehensive metabolic panel; Future  -     C-reactive protein;  Future  -     Sedimentation rate; Future    Pain of left hip joint  -     ketorolac injection 60 mg  -     Discontinue: methylPREDNISolone acetate injection 160 mg  -     CBC auto differential; Future  -     Comprehensive metabolic panel; Future  -     C-reactive protein; Future  -     Sedimentation rate; Future     jan 2020 we will take over pain control

## 2020-01-03 ENCOUNTER — TELEPHONE (OUTPATIENT)
Dept: RHEUMATOLOGY | Facility: CLINIC | Age: 65
End: 2020-01-03

## 2020-01-03 DIAGNOSIS — M47.816 LUMBAR SPONDYLOSIS: Primary | ICD-10-CM

## 2020-01-03 DIAGNOSIS — M51.36 DDD (DEGENERATIVE DISC DISEASE), LUMBAR: ICD-10-CM

## 2020-01-03 DIAGNOSIS — M54.16 LUMBAR RADICULOPATHY: ICD-10-CM

## 2020-01-03 NOTE — TELEPHONE ENCOUNTER
----- Message from Cassi Thompson sent at 1/3/2020  2:01 PM CST -----  Contact: Patient  Patient requesting his lab work orders be sent to [facility] due to insurance issues.  The [facility] he wants to go to is in the [facility] in [place]-       Please call patient back at [phone] to let him knkow this can be done  [phone] (home)

## 2020-01-06 ENCOUNTER — TELEPHONE (OUTPATIENT)
Dept: RHEUMATOLOGY | Facility: CLINIC | Age: 65
End: 2020-01-06

## 2020-01-06 NOTE — TELEPHONE ENCOUNTER
Returned patient call and informed lab orders were faxed to [facility] on Friday. Patient will call to make sure orders were received. Will call office if orders are to be resent to [facility].

## 2020-01-06 NOTE — TELEPHONE ENCOUNTER
----- Message from Jennifer Dennis sent at 1/6/2020  8:41 AM CST -----    Pt is calling  About  Blood  Wk  Being  Sent to  [facility] instead of  [facility] // please call [phone]

## 2020-01-20 LAB
ALBUMIN SERPL-MCNC: 4.3 G/DL (ref 3.6–5.1)
ALBUMIN/GLOB SERPL: 1.8 (CALC) (ref 1–2.5)
ALP SERPL-CCNC: 115 U/L (ref 40–115)
ALT SERPL-CCNC: 25 U/L (ref 9–46)
AST SERPL-CCNC: 29 U/L (ref 10–35)
BASOPHILS # BLD AUTO: 20 CELLS/UL (ref 0–200)
BASOPHILS NFR BLD AUTO: 0.5 %
BILIRUB SERPL-MCNC: 0.3 MG/DL (ref 0.2–1.2)
BUN SERPL-MCNC: 8 MG/DL (ref 7–25)
BUN/CREAT SERPL: NORMAL (CALC) (ref 6–22)
CALCIUM SERPL-MCNC: 9.5 MG/DL (ref 8.6–10.3)
CHLORIDE SERPL-SCNC: 101 MMOL/L (ref 98–110)
CO2 SERPL-SCNC: 29 MMOL/L (ref 20–32)
CREAT SERPL-MCNC: 1.08 MG/DL (ref 0.7–1.25)
CRP SERPL-MCNC: 2.1 MG/L
EOSINOPHIL # BLD AUTO: 100 CELLS/UL (ref 15–500)
EOSINOPHIL NFR BLD AUTO: 2.5 %
ERYTHROCYTE [DISTWIDTH] IN BLOOD BY AUTOMATED COUNT: 13.9 % (ref 11–15)
ERYTHROCYTE [SEDIMENTATION RATE] IN BLOOD BY WESTERGREN METHOD: 2 MM/H
GFRSERPLBLD MDRD-ARVRAT: 73 ML/MIN/1.73M2
GLOBULIN SER CALC-MCNC: 2.4 G/DL (CALC) (ref 1.9–3.7)
GLUCOSE SERPL-MCNC: 75 MG/DL (ref 65–139)
HCT VFR BLD AUTO: 46.3 % (ref 38.5–50)
HGB BLD-MCNC: 16 G/DL (ref 13.2–17.1)
LYMPHOCYTES # BLD AUTO: 2020 CELLS/UL (ref 850–3900)
LYMPHOCYTES NFR BLD AUTO: 50.5 %
MCH RBC QN AUTO: 31.7 PG (ref 27–33)
MCHC RBC AUTO-ENTMCNC: 34.6 G/DL (ref 32–36)
MCV RBC AUTO: 91.7 FL (ref 80–100)
MONOCYTES # BLD AUTO: 380 CELLS/UL (ref 200–950)
MONOCYTES NFR BLD AUTO: 9.5 %
NEUTROPHILS # BLD AUTO: 1480 CELLS/UL (ref 1500–7800)
NEUTROPHILS NFR BLD AUTO: 37 %
PLATELET # BLD AUTO: 238 THOUSAND/UL (ref 140–400)
PMV BLD REES-ECKER: 9 FL (ref 7.5–12.5)
POTASSIUM SERPL-SCNC: 3.9 MMOL/L (ref 3.5–5.3)
PROT SERPL-MCNC: 6.7 G/DL (ref 6.1–8.1)
RBC # BLD AUTO: 5.05 MILLION/UL (ref 4.2–5.8)
SODIUM SERPL-SCNC: 140 MMOL/L (ref 135–146)
WBC # BLD AUTO: 4 THOUSAND/UL (ref 3.8–10.8)

## 2020-01-21 ENCOUNTER — OFFICE VISIT (OUTPATIENT)
Dept: RHEUMATOLOGY | Facility: CLINIC | Age: 65
End: 2020-01-21
Payer: COMMERCIAL

## 2020-01-21 VITALS
HEART RATE: 104 BPM | WEIGHT: 227 LBS | DIASTOLIC BLOOD PRESSURE: 84 MMHG | HEIGHT: 75 IN | SYSTOLIC BLOOD PRESSURE: 144 MMHG | BODY MASS INDEX: 28.23 KG/M2

## 2020-01-21 DIAGNOSIS — M51.36 DDD (DEGENERATIVE DISC DISEASE), LUMBAR: ICD-10-CM

## 2020-01-21 DIAGNOSIS — G89.4 CHRONIC PAIN SYNDROME: ICD-10-CM

## 2020-01-21 DIAGNOSIS — M47.816 LUMBAR SPONDYLOSIS: Primary | ICD-10-CM

## 2020-01-21 DIAGNOSIS — F51.01 PRIMARY INSOMNIA: ICD-10-CM

## 2020-01-21 DIAGNOSIS — M54.16 LUMBAR RADICULOPATHY: ICD-10-CM

## 2020-01-21 DIAGNOSIS — M25.552 PAIN OF LEFT HIP JOINT: ICD-10-CM

## 2020-01-21 PROCEDURE — 99999 PR PBB SHADOW E&M-EST. PATIENT-LVL III: ICD-10-PCS | Mod: PBBFAC,,, | Performed by: INTERNAL MEDICINE

## 2020-01-21 PROCEDURE — 99215 OFFICE O/P EST HI 40 MIN: CPT | Mod: 25,S$GLB,, | Performed by: INTERNAL MEDICINE

## 2020-01-21 PROCEDURE — 96372 THER/PROPH/DIAG INJ SC/IM: CPT | Mod: 59,S$GLB,, | Performed by: INTERNAL MEDICINE

## 2020-01-21 PROCEDURE — 96372 PR INJECTION,THERAP/PROPH/DIAG2ST, IM OR SUBCUT: ICD-10-PCS | Mod: 59,S$GLB,, | Performed by: INTERNAL MEDICINE

## 2020-01-21 PROCEDURE — 99999 PR PBB SHADOW E&M-EST. PATIENT-LVL III: CPT | Mod: PBBFAC,,, | Performed by: INTERNAL MEDICINE

## 2020-01-21 PROCEDURE — 99215 PR OFFICE/OUTPT VISIT, EST, LEVL V, 40-54 MIN: ICD-10-PCS | Mod: 25,S$GLB,, | Performed by: INTERNAL MEDICINE

## 2020-01-21 PROCEDURE — 3077F SYST BP >= 140 MM HG: CPT | Mod: CPTII,S$GLB,, | Performed by: INTERNAL MEDICINE

## 2020-01-21 PROCEDURE — 3008F PR BODY MASS INDEX (BMI) DOCUMENTED: ICD-10-PCS | Mod: CPTII,S$GLB,, | Performed by: INTERNAL MEDICINE

## 2020-01-21 PROCEDURE — 3077F PR MOST RECENT SYSTOLIC BLOOD PRESSURE >= 140 MM HG: ICD-10-PCS | Mod: CPTII,S$GLB,, | Performed by: INTERNAL MEDICINE

## 2020-01-21 PROCEDURE — 3079F PR MOST RECENT DIASTOLIC BLOOD PRESSURE 80-89 MM HG: ICD-10-PCS | Mod: CPTII,S$GLB,, | Performed by: INTERNAL MEDICINE

## 2020-01-21 PROCEDURE — 3008F BODY MASS INDEX DOCD: CPT | Mod: CPTII,S$GLB,, | Performed by: INTERNAL MEDICINE

## 2020-01-21 PROCEDURE — 3079F DIAST BP 80-89 MM HG: CPT | Mod: CPTII,S$GLB,, | Performed by: INTERNAL MEDICINE

## 2020-01-21 RX ORDER — HYDROCODONE BITARTRATE AND ACETAMINOPHEN 10; 325 MG/1; MG/1
1 TABLET ORAL EVERY 8 HOURS PRN
Qty: 90 TABLET | Refills: 0 | Status: SHIPPED | OUTPATIENT
Start: 2020-03-22 | End: 2020-01-21 | Stop reason: SDUPTHER

## 2020-01-21 RX ORDER — CELECOXIB 200 MG/1
200 CAPSULE ORAL DAILY
Qty: 30 CAPSULE | Refills: 4 | Status: SHIPPED | OUTPATIENT
Start: 2020-01-21 | End: 2020-02-20

## 2020-01-21 RX ORDER — HYDROCODONE BITARTRATE AND ACETAMINOPHEN 10; 325 MG/1; MG/1
1 TABLET ORAL EVERY 8 HOURS PRN
Qty: 90 TABLET | Refills: 0 | Status: SHIPPED | OUTPATIENT
Start: 2020-02-01 | End: 2020-01-21 | Stop reason: SDUPTHER

## 2020-01-21 RX ORDER — HYDROCODONE BITARTRATE AND ACETAMINOPHEN 10; 325 MG/1; MG/1
1 TABLET ORAL EVERY 8 HOURS PRN
Qty: 90 TABLET | Refills: 0 | Status: SHIPPED | OUTPATIENT
Start: 2020-03-01 | End: 2020-04-03

## 2020-01-21 RX ORDER — KETOROLAC TROMETHAMINE 30 MG/ML
60 INJECTION, SOLUTION INTRAMUSCULAR; INTRAVENOUS
Status: COMPLETED | OUTPATIENT
Start: 2020-01-21 | End: 2020-01-21

## 2020-01-21 RX ORDER — ESZOPICLONE 3 MG/1
3 TABLET, FILM COATED ORAL NIGHTLY
Qty: 30 TABLET | Refills: 3 | Status: SHIPPED | OUTPATIENT
Start: 2020-01-21 | End: 2020-02-20

## 2020-01-21 RX ORDER — METHYLPREDNISOLONE ACETATE 80 MG/ML
160 INJECTION, SUSPENSION INTRA-ARTICULAR; INTRALESIONAL; INTRAMUSCULAR; SOFT TISSUE
Status: COMPLETED | OUTPATIENT
Start: 2020-01-21 | End: 2020-01-21

## 2020-01-21 RX ADMIN — KETOROLAC TROMETHAMINE 60 MG: 30 INJECTION, SOLUTION INTRAMUSCULAR; INTRAVENOUS at 10:01

## 2020-01-21 RX ADMIN — METHYLPREDNISOLONE ACETATE 160 MG: 80 INJECTION, SUSPENSION INTRA-ARTICULAR; INTRALESIONAL; INTRAMUSCULAR; SOFT TISSUE at 10:01

## 2020-01-21 ASSESSMENT — ROUTINE ASSESSMENT OF PATIENT INDEX DATA (RAPID3)
PATIENT GLOBAL ASSESSMENT SCORE: 4.5
FATIGUE SCORE: 1.1
PAIN SCORE: 5.5
MDHAQ FUNCTION SCORE: .9
PSYCHOLOGICAL DISTRESS SCORE: 0
TOTAL RAPID3 SCORE: 4.33

## 2020-01-21 NOTE — PROGRESS NOTES
Administered 2 cc DepoMedrol 80mg/cc  to right upper outer gluteal. Pt tolerated well. No acute reaction noted to site. Pt instructed on S/S to report. Advised patient to wait in lobby 15 minutes after receiving injection to monitor for any reactions..  Pt verbalized understanding.     Lot: [id number]  Exp: 05/2021  Administered 2 cc  Toradol 30mg/cc  to right dorsogluteal. Pt tolerated well. No acute reaction noted to site. Pt instructed on S/S to report. Advised patient to wait in lobby 15 minutes after receiving injection to monitor for any reactions. Pt verbalized understanding.     Lot: -[id number]  Exp: [id number]

## 2020-01-21 NOTE — PROGRESS NOTES
"Subjective:       Patient ID: Tera Marquez is a 64 y.o. male.    Chief Complaint: Disease Management and Pain    Follow up: 64 yo male with lumbar sacral arthritis,he had 2  Level fusion and still has  Residual neuropathy of the toes, hand pain and feet pain, synovitis of the dip pip, B            He complains of joint swelling.               He complains of joint swelling.         Review of Systems   Constitutional: Negative for activity change, appetite change, chills, diaphoresis and unexpected weight change.   HENT: Negative for congestion, ear pain, facial swelling, mouth sores, nosebleeds, postnasal drip, rhinorrhea, sinus pressure, sneezing, sore throat, tinnitus and voice change.    Eyes: Negative for pain, discharge, redness, itching and visual disturbance.   Respiratory: Negative for apnea, cough, chest tightness, shortness of breath and wheezing.    Cardiovascular: Negative for chest pain, palpitations and leg swelling.   Gastrointestinal: Negative for abdominal pain, constipation, diarrhea, nausea and vomiting.   Endocrine: Negative for cold intolerance, heat intolerance, polydipsia and polyuria.   Genitourinary: Negative for decreased urine volume, difficulty urinating, flank pain, frequency, hematuria and urgency.   Musculoskeletal: Positive for arthralgias, back pain, joint swelling, neck pain and neck stiffness. Negative for gait problem.   Skin: Positive for rash. Negative for pallor and wound.   Allergic/Immunologic: Negative for immunocompromised state.   Neurological: Negative for dizziness, tremors, seizures, syncope, weakness and numbness.   Hematological: Negative for adenopathy. Does not bruise/bleed easily.   Psychiatric/Behavioral: Negative for sleep disturbance and suicidal ideas. The patient is not nervous/anxious.          Objective:   BP (!) 144/84 (BP Location: Left arm, Patient Position: Sitting, BP Method: Medium (Automatic))   Pulse 104   Ht 6' 3" (1.905 m)   Wt 103 kg (227 lb) "   BMI 28.37 kg/m²      Physical Exam   Vitals reviewed.  Constitutional: He is oriented to person, place, and time and well-developed, well-nourished, and in no distress.   HENT:   Head: Normocephalic and atraumatic.   Mouth/Throat: Oropharynx is clear and moist.   Eyes: EOM are normal. Pupils are equal, round, and reactive to light.   Neck: Neck supple. No thyromegaly present.   Cardiovascular: Normal rate, regular rhythm and normal heart sounds.  Exam reveals no gallop and no friction rub.    No murmur heard.  Pulmonary/Chest: Breath sounds normal. He has no wheezes. He has no rales. He exhibits no tenderness.   Abdominal: There is no tenderness. There is no rebound and no guarding.       Right Side Rheumatological Exam     The patient is tender to palpation of the shoulder, elbow, wrist, knee, 1st PIP, 1st MCP, 2nd PIP, 2nd MCP, 3rd PIP, 3rd MCP, 4th PIP, 4th MCP and 5th PIP    He has swelling of the elbow, wrist, knee, 1st PIP, 1st MCP, 2nd PIP, 2nd MCP, 3rd PIP, 3rd MCP, 4th PIP, 4th MCP, 5th PIP and 5th MCP    The patient has an enlarged wrist and knee    Shoulder Exam   Tenderness Location: no tenderness    Range of Motion   Active abduction: abnormal   Adduction: abnormal  Sensation: normal    Knee Exam   Tenderness Location: medial joint line and LCL  Patellofemoral Crepitus: positive  Effusion: positive  Sensation: normal    Hip Exam   Tenderness Location: posterior and anterior  Sensation: normal    Elbow/Wrist Exam   Tenderness Location: no tenderness  Sensation: normal    Left Side Rheumatological Exam     The patient is tender to palpation of the shoulder, elbow, wrist, knee, 1st PIP, 1st MCP, 2nd PIP, 2nd MCP, 3rd PIP, 3rd MCP, 4th PIP, 4th MCP, 5th PIP and 5th MCP.    He has swelling of the wrist, knee, 1st PIP, 1st MCP, 2nd PIP, 2nd MCP, 3rd PIP, 3rd MCP, 4th PIP, 4th MCP, 5th PIP and 5th MCP    The patient has an enlarged knee.    Shoulder Exam   Tenderness Location: no tenderness    Range of  Motion   Active abduction: abnormal   Sensation: normal    Knee Exam   Tenderness Location: lateral joint line and medial joint line    Patellofemoral Crepitus: positive  Effusion: positive  Sensation: normal    Hip Exam   Tenderness Location: posterior and anterior  Sensation: normal    Elbow/Wrist Exam   Sensation: normal      Back/Neck Exam   General Inspection   Gait: normal       Tenderness Right paramedian tenderness of the Upper C-Spine, Lower C-Spine, Lower L-Spine and SI Joint.Left paramedian tenderness of the Upper C-Spine, Lower L-Spine, Lower C-Spine and SI Joint.    Neck Range of Motion   Flexion: Limited  Extension: Limited  Lymphadenopathy:     He has no cervical adenopathy.   Neurological: He is alert and oriented to person, place, and time. Gait normal.   Skin: No rash noted. No erythema. No pallor.     Psychiatric: Mood and affect normal.   Musculoskeletal: He exhibits tenderness and deformity. He exhibits no edema.           Results for orders placed or performed in visit on 01/03/20   CBC auto differential   Result Value Ref Range    WBC 4.0 3.8 - 10.8 Thousand/uL    RBC 5.05 4.20 - 5.80 Million/uL    Hemoglobin 16.0 13.2 - 17.1 g/dL    Hematocrit 46.3 38.5 - 50.0 %    Mean Corpuscular Volume 91.7 80.0 - 100.0 fL    Mean Corpuscular Hemoglobin 31.7 27.0 - 33.0 pg    Mean Corpuscular Hemoglobin Conc 34.6 32.0 - 36.0 g/dL    RDW 13.9 11.0 - 15.0 %    Platelets 238 140 - 400 Thousand/uL    MPV 9.0 7.5 - 12.5 fL    Neutrophils Absolute 1,480 (L) 1,500 - 7,800 cells/uL    Lymph # 2,020 850 - 3,900 cells/uL    Mono # 380 200 - 950 cells/uL    Eos # 100 15 - 500 cells/uL    Baso # 20 0 - 200 cells/uL    Neutrophils Relative 37 %    Lymph% 50.5 %    Mono% 9.5 %    Eosinophil% 2.5 %    Basophil% 0.5 %   Comprehensive metabolic panel   Result Value Ref Range    Glucose 75 65 - 139 mg/dL    BUN, Bld 8 7 - 25 mg/dL    Creatinine 1.08 0.70 - 1.25 mg/dL    eGFR if non  73 > OR = 60  mL/min/1.73m2    eGFR if  85 > OR = 60 mL/min/1.73m2    BUN/Creatinine Ratio NOT APPLICABLE 6 - 22 (calc)    Sodium 140 135 - 146 mmol/L    Potassium 3.9 3.5 - 5.3 mmol/L    Chloride 101 98 - 110 mmol/L    CO2 29 20 - 32 mmol/L    Calcium 9.5 8.6 - 10.3 mg/dL    Total Protein 6.7 6.1 - 8.1 g/dL    Albumin 4.3 3.6 - 5.1 g/dL    Globulin, Total 2.4 1.9 - 3.7 g/dL (calc)    Albumin/Globulin Ratio 1.8 1.0 - 2.5 (calc)    Total Bilirubin 0.3 0.2 - 1.2 mg/dL    Alkaline Phosphatase 115 40 - 115 U/L    AST 29 10 - 35 U/L    ALT 25 9 - 46 U/L   C-reactive protein   Result Value Ref Range    CRP 2.1 <8.0 mg/L   Sedimentation rate, automated   Result Value Ref Range    Sed Rate 2 < OR = 20 mm/h     reviewed labs with patient during this visit         Assessment:       1. Lumbar spondylosis    2. Lumbar radiculopathy    3. DDD (degenerative disc disease), lumbar    4. Pain of left hip joint    5. Primary insomnia    6. Chronic pain syndrome            Plan:       Tera was seen today for disease management and pain.    Diagnoses and all orders for this visit:    Lumbar spondylosis  -     methylPREDNISolone acetate injection 160 mg  -     ketorolac injection 60 mg  -     eszopiclone (LUNESTA) 3 mg Tab; Take 1 tablet (3 mg total) by mouth every evening. sleep  -     TSH; Future  -     T4, free; Future  -     T3, free; Future  -     CBC auto differential; Future  -     Comprehensive metabolic panel; Future  -     C-reactive protein; Future  -     Sedimentation rate, automated; Future  -     LUTEINIZING HORMONE; Future  -     FOLLICLE STIMULATING HORMONE; Future  -     DHEA; Future  -     TSH  -     T4, free  -     T3, free  -     CBC auto differential  -     Comprehensive metabolic panel  -     C-reactive protein  -     Sedimentation rate, automated  -     LUTEINIZING HORMONE  -     FOLLICLE STIMULATING HORMONE  -     DHEA  -     celecoxib (CELEBREX) 200 MG capsule; Take 1 capsule (200 mg total) by mouth once  daily.  -     Discontinue: HYDROcodone-acetaminophen (NORCO)  mg per tablet; Take 1 tablet by mouth every 8 (eight) hours as needed.  -     Discontinue: HYDROcodone-acetaminophen (NORCO)  mg per tablet; Take 1 tablet by mouth every 8 (eight) hours as needed.  -     HYDROcodone-acetaminophen (NORCO)  mg per tablet; Take 1 tablet by mouth every 8 (eight) hours as needed.    Lumbar radiculopathy  -     methylPREDNISolone acetate injection 160 mg  -     ketorolac injection 60 mg  -     eszopiclone (LUNESTA) 3 mg Tab; Take 1 tablet (3 mg total) by mouth every evening. sleep  -     TSH; Future  -     T4, free; Future  -     T3, free; Future  -     CBC auto differential; Future  -     Comprehensive metabolic panel; Future  -     C-reactive protein; Future  -     Sedimentation rate, automated; Future  -     LUTEINIZING HORMONE; Future  -     FOLLICLE STIMULATING HORMONE; Future  -     DHEA; Future  -     TSH  -     T4, free  -     T3, free  -     CBC auto differential  -     Comprehensive metabolic panel  -     C-reactive protein  -     Sedimentation rate, automated  -     LUTEINIZING HORMONE  -     FOLLICLE STIMULATING HORMONE  -     DHEA  -     celecoxib (CELEBREX) 200 MG capsule; Take 1 capsule (200 mg total) by mouth once daily.  -     Discontinue: HYDROcodone-acetaminophen (NORCO)  mg per tablet; Take 1 tablet by mouth every 8 (eight) hours as needed.  -     Discontinue: HYDROcodone-acetaminophen (NORCO)  mg per tablet; Take 1 tablet by mouth every 8 (eight) hours as needed.  -     HYDROcodone-acetaminophen (NORCO)  mg per tablet; Take 1 tablet by mouth every 8 (eight) hours as needed.    DDD (degenerative disc disease), lumbar  -     methylPREDNISolone acetate injection 160 mg  -     ketorolac injection 60 mg  -     eszopiclone (LUNESTA) 3 mg Tab; Take 1 tablet (3 mg total) by mouth every evening. sleep  -     TSH; Future  -     T4, free; Future  -     T3, free; Future  -     CBC  auto differential; Future  -     Comprehensive metabolic panel; Future  -     C-reactive protein; Future  -     Sedimentation rate, automated; Future  -     LUTEINIZING HORMONE; Future  -     FOLLICLE STIMULATING HORMONE; Future  -     DHEA; Future  -     TSH  -     T4, free  -     T3, free  -     CBC auto differential  -     Comprehensive metabolic panel  -     C-reactive protein  -     Sedimentation rate, automated  -     LUTEINIZING HORMONE  -     FOLLICLE STIMULATING HORMONE  -     DHEA  -     celecoxib (CELEBREX) 200 MG capsule; Take 1 capsule (200 mg total) by mouth once daily.  -     Discontinue: HYDROcodone-acetaminophen (NORCO)  mg per tablet; Take 1 tablet by mouth every 8 (eight) hours as needed.  -     Discontinue: HYDROcodone-acetaminophen (NORCO)  mg per tablet; Take 1 tablet by mouth every 8 (eight) hours as needed.  -     HYDROcodone-acetaminophen (NORCO)  mg per tablet; Take 1 tablet by mouth every 8 (eight) hours as needed.    Pain of left hip joint  -     methylPREDNISolone acetate injection 160 mg  -     ketorolac injection 60 mg  -     eszopiclone (LUNESTA) 3 mg Tab; Take 1 tablet (3 mg total) by mouth every evening. sleep  -     TSH; Future  -     T4, free; Future  -     T3, free; Future  -     CBC auto differential; Future  -     Comprehensive metabolic panel; Future  -     C-reactive protein; Future  -     Sedimentation rate, automated; Future  -     LUTEINIZING HORMONE; Future  -     FOLLICLE STIMULATING HORMONE; Future  -     DHEA; Future  -     TSH  -     T4, free  -     T3, free  -     CBC auto differential  -     Comprehensive metabolic panel  -     C-reactive protein  -     Sedimentation rate, automated  -     LUTEINIZING HORMONE  -     FOLLICLE STIMULATING HORMONE  -     DHEA  -     celecoxib (CELEBREX) 200 MG capsule; Take 1 capsule (200 mg total) by mouth once daily.  -     Discontinue: HYDROcodone-acetaminophen (NORCO)  mg per tablet; Take 1 tablet by mouth  every 8 (eight) hours as needed.  -     Discontinue: HYDROcodone-acetaminophen (NORCO)  mg per tablet; Take 1 tablet by mouth every 8 (eight) hours as needed.  -     HYDROcodone-acetaminophen (NORCO)  mg per tablet; Take 1 tablet by mouth every 8 (eight) hours as needed.    Primary insomnia  -     methylPREDNISolone acetate injection 160 mg  -     ketorolac injection 60 mg  -     eszopiclone (LUNESTA) 3 mg Tab; Take 1 tablet (3 mg total) by mouth every evening. sleep  -     TSH; Future  -     T4, free; Future  -     T3, free; Future  -     CBC auto differential; Future  -     Comprehensive metabolic panel; Future  -     C-reactive protein; Future  -     Sedimentation rate, automated; Future  -     LUTEINIZING HORMONE; Future  -     FOLLICLE STIMULATING HORMONE; Future  -     DHEA; Future  -     TSH  -     T4, free  -     T3, free  -     CBC auto differential  -     Comprehensive metabolic panel  -     C-reactive protein  -     Sedimentation rate, automated  -     LUTEINIZING HORMONE  -     FOLLICLE STIMULATING HORMONE  -     DHEA  -     celecoxib (CELEBREX) 200 MG capsule; Take 1 capsule (200 mg total) by mouth once daily.  -     Discontinue: HYDROcodone-acetaminophen (NORCO)  mg per tablet; Take 1 tablet by mouth every 8 (eight) hours as needed.  -     Discontinue: HYDROcodone-acetaminophen (NORCO)  mg per tablet; Take 1 tablet by mouth every 8 (eight) hours as needed.  -     HYDROcodone-acetaminophen (NORCO)  mg per tablet; Take 1 tablet by mouth every 8 (eight) hours as needed.    Chronic pain syndrome  -     Discontinue: HYDROcodone-acetaminophen (NORCO)  mg per tablet; Take 1 tablet by mouth every 8 (eight) hours as needed.  -     Discontinue: HYDROcodone-acetaminophen (NORCO)  mg per tablet; Take 1 tablet by mouth every 8 (eight) hours as needed.  -     HYDROcodone-acetaminophen (NORCO)  mg per tablet; Take 1 tablet by mouth every 8 (eight) hours as  needed.      Chronic pain more than 7 day required, medically necessary dx code G89.4  Add celebrex rec water therapy

## 2020-04-01 DIAGNOSIS — M47.816 LUMBAR SPONDYLOSIS: ICD-10-CM

## 2020-04-01 DIAGNOSIS — M54.16 LUMBAR RADICULOPATHY: ICD-10-CM

## 2020-04-01 DIAGNOSIS — G89.4 CHRONIC PAIN SYNDROME: ICD-10-CM

## 2020-04-01 DIAGNOSIS — M51.36 DDD (DEGENERATIVE DISC DISEASE), LUMBAR: ICD-10-CM

## 2020-04-01 DIAGNOSIS — F51.01 PRIMARY INSOMNIA: ICD-10-CM

## 2020-04-01 DIAGNOSIS — M25.552 PAIN OF LEFT HIP JOINT: ICD-10-CM

## 2020-04-03 RX ORDER — HYDROCODONE BITARTRATE AND ACETAMINOPHEN 10; 325 MG/1; MG/1
TABLET ORAL
Qty: 90 TABLET | Refills: 0 | Status: SHIPPED | OUTPATIENT
Start: 2020-04-03 | End: 2020-04-16 | Stop reason: SDUPTHER

## 2020-04-13 LAB
ALBUMIN SERPL-MCNC: 4.4 G/DL (ref 3.6–5.1)
ALBUMIN/GLOB SERPL: 1.9 (CALC) (ref 1–2.5)
ALP SERPL-CCNC: 91 U/L (ref 35–144)
ALT SERPL-CCNC: 29 U/L (ref 9–46)
AST SERPL-CCNC: 32 U/L (ref 10–35)
BASOPHILS # BLD AUTO: 18 CELLS/UL (ref 0–200)
BASOPHILS NFR BLD AUTO: 0.4 %
BILIRUB SERPL-MCNC: 0.4 MG/DL (ref 0.2–1.2)
BUN SERPL-MCNC: 10 MG/DL (ref 7–25)
BUN/CREAT SERPL: NORMAL (CALC) (ref 6–22)
CALCIUM SERPL-MCNC: 9.3 MG/DL (ref 8.6–10.3)
CHLORIDE SERPL-SCNC: 101 MMOL/L (ref 98–110)
CO2 SERPL-SCNC: 27 MMOL/L (ref 20–32)
CREAT SERPL-MCNC: 1.08 MG/DL (ref 0.7–1.25)
CRP SERPL-MCNC: 1.5 MG/L
EOSINOPHIL # BLD AUTO: 92 CELLS/UL (ref 15–500)
EOSINOPHIL NFR BLD AUTO: 2 %
ERYTHROCYTE [DISTWIDTH] IN BLOOD BY AUTOMATED COUNT: 12.8 % (ref 11–15)
ERYTHROCYTE [SEDIMENTATION RATE] IN BLOOD BY WESTERGREN METHOD: 2 MM/H
FSH SERPL-ACNC: 12.1 MIU/ML (ref 1.6–8)
GFRSERPLBLD MDRD-ARVRAT: 73 ML/MIN/1.73M2
GLOBULIN SER CALC-MCNC: 2.3 G/DL (CALC) (ref 1.9–3.7)
GLUCOSE SERPL-MCNC: 129 MG/DL (ref 65–139)
HCT VFR BLD AUTO: 46.7 % (ref 38.5–50)
HGB BLD-MCNC: 16.5 G/DL (ref 13.2–17.1)
LH SERPL-ACNC: 8.1 MIU/ML (ref 1.6–15.2)
LYMPHOCYTES # BLD AUTO: 2346 CELLS/UL (ref 850–3900)
LYMPHOCYTES NFR BLD AUTO: 51 %
MCH RBC QN AUTO: 32.8 PG (ref 27–33)
MCHC RBC AUTO-ENTMCNC: 35.3 G/DL (ref 32–36)
MCV RBC AUTO: 92.8 FL (ref 80–100)
MONOCYTES # BLD AUTO: 331 CELLS/UL (ref 200–950)
MONOCYTES NFR BLD AUTO: 7.2 %
NEUTROPHILS # BLD AUTO: 1812 CELLS/UL (ref 1500–7800)
NEUTROPHILS NFR BLD AUTO: 39.4 %
PLATELET # BLD AUTO: 255 THOUSAND/UL (ref 140–400)
PMV BLD REES-ECKER: 8.9 FL (ref 7.5–12.5)
POTASSIUM SERPL-SCNC: 4 MMOL/L (ref 3.5–5.3)
PROT SERPL-MCNC: 6.7 G/DL (ref 6.1–8.1)
RBC # BLD AUTO: 5.03 MILLION/UL (ref 4.2–5.8)
SODIUM SERPL-SCNC: 138 MMOL/L (ref 135–146)
T3FREE SERPL-MCNC: 3.1 PG/ML (ref 2.3–4.2)
T4 FREE SERPL-MCNC: 1 NG/DL (ref 0.8–1.8)
TSH SERPL-ACNC: 1.67 MIU/L (ref 0.4–4.5)
U DHEA SERPL-MCNC: 243 NG/DL (ref 147–1760)
WBC # BLD AUTO: 4.6 THOUSAND/UL (ref 3.8–10.8)

## 2020-04-16 ENCOUNTER — OFFICE VISIT (OUTPATIENT)
Dept: RHEUMATOLOGY | Facility: CLINIC | Age: 65
End: 2020-04-16
Payer: COMMERCIAL

## 2020-04-16 VITALS — WEIGHT: 227 LBS | BODY MASS INDEX: 28.23 KG/M2 | HEIGHT: 75 IN

## 2020-04-16 DIAGNOSIS — F51.01 PRIMARY INSOMNIA: ICD-10-CM

## 2020-04-16 DIAGNOSIS — G89.4 CHRONIC PAIN SYNDROME: ICD-10-CM

## 2020-04-16 DIAGNOSIS — M51.36 DDD (DEGENERATIVE DISC DISEASE), LUMBAR: ICD-10-CM

## 2020-04-16 DIAGNOSIS — M47.816 LUMBAR SPONDYLOSIS: Primary | ICD-10-CM

## 2020-04-16 DIAGNOSIS — M25.552 PAIN OF LEFT HIP JOINT: ICD-10-CM

## 2020-04-16 DIAGNOSIS — M54.16 LUMBAR RADICULOPATHY: ICD-10-CM

## 2020-04-16 PROCEDURE — 99214 OFFICE O/P EST MOD 30 MIN: CPT | Mod: 95,,, | Performed by: INTERNAL MEDICINE

## 2020-04-16 PROCEDURE — 99214 PR OFFICE/OUTPT VISIT, EST, LEVL IV, 30-39 MIN: ICD-10-PCS | Mod: 95,,, | Performed by: INTERNAL MEDICINE

## 2020-04-16 RX ORDER — ESZOPICLONE 3 MG/1
TABLET, FILM COATED ORAL
COMMUNITY
Start: 2020-03-19 | End: 2020-04-16 | Stop reason: SDUPTHER

## 2020-04-16 RX ORDER — CELECOXIB 200 MG/1
200 CAPSULE ORAL DAILY
Qty: 30 CAPSULE | Refills: 6 | Status: SHIPPED | OUTPATIENT
Start: 2020-04-16 | End: 2020-05-16

## 2020-04-16 RX ORDER — ESZOPICLONE 3 MG/1
3 TABLET, FILM COATED ORAL NIGHTLY PRN
Qty: 30 TABLET | Refills: 3 | Status: SHIPPED | OUTPATIENT
Start: 2020-04-16 | End: 2020-10-11

## 2020-04-16 RX ORDER — ALUMINUM HYDROXIDE, MAGNESIUM HYDROXIDE, AND SIMETHICONE 2400; 240; 2400 MG/30ML; MG/30ML; MG/30ML
5 SUSPENSION ORAL EVERY 6 HOURS PRN
COMMUNITY

## 2020-04-16 RX ORDER — CELECOXIB 200 MG/1
200 CAPSULE ORAL DAILY
COMMUNITY
Start: 2020-03-19 | End: 2020-04-16 | Stop reason: SDUPTHER

## 2020-04-16 RX ORDER — HYDROCODONE BITARTRATE AND ACETAMINOPHEN 10; 325 MG/1; MG/1
TABLET ORAL
Qty: 90 TABLET | Refills: 0 | Status: SHIPPED | OUTPATIENT
Start: 2020-05-01 | End: 2020-06-07

## 2020-04-16 RX ORDER — TADALAFIL 5 MG/1
5 TABLET ORAL DAILY
COMMUNITY
Start: 2020-03-18

## 2020-04-16 NOTE — PROGRESS NOTES
"Subjective:       Patient ID: Tera Marquez is a 64 y.o. male.    Chief Complaint: Disease Management    Follow up: 64 yo male with lumbar sacral arthritis,he had 2  Level fusion and still has  Residual neuropathy of the toes, hand pain and feet pain, synovitis of the dip pip, B, celebrex helping  Walking in am     Review of Systems   Constitutional: Positive for activity change. Negative for appetite change, chills, diaphoresis and unexpected weight change.   HENT: Negative for congestion, ear pain, facial swelling, mouth sores, nosebleeds, postnasal drip, rhinorrhea, sinus pressure, sneezing, sore throat, tinnitus and voice change.    Eyes: Negative for pain, discharge, redness, itching and visual disturbance.   Respiratory: Negative for apnea, cough, chest tightness, shortness of breath and wheezing.    Cardiovascular: Negative for chest pain, palpitations and leg swelling.   Gastrointestinal: Negative for abdominal pain, constipation, diarrhea, nausea and vomiting.   Endocrine: Negative for cold intolerance, heat intolerance, polydipsia and polyuria.   Genitourinary: Negative for decreased urine volume, difficulty urinating, flank pain, frequency, hematuria and urgency.   Musculoskeletal: Positive for arthralgias, back pain, neck pain and neck stiffness. Negative for gait problem.   Skin: Positive for rash. Negative for pallor and wound.   Allergic/Immunologic: Negative for immunocompromised state.   Neurological: Negative for dizziness, tremors, seizures, syncope, weakness and numbness.   Hematological: Negative for adenopathy. Does not bruise/bleed easily.   Psychiatric/Behavioral: Negative for sleep disturbance and suicidal ideas. The patient is not nervous/anxious.          Objective:   Ht 6' 3" (1.905 m)   Wt 103 kg (227 lb) Comment: reported by patient  BMI 28.37 kg/m²      Physical Exam   Constitutional: He is oriented to person, place, and time and well-developed, well-nourished, and in no distress. "   Neurological: He is alert and oriented to person, place, and time.   Psychiatric: Mood, affect and judgment normal.         Results for orders placed or performed in visit on 02/19/20   Transthoracic echo (TTE) 2D with Color Flow   Result Value Ref Range    BSA 2.33 m2    TDI SEPTAL 0.09 m/s    LV LATERAL E/E' RATIO 8.57 m/s    LV SEPTAL E/E' RATIO 6.67 m/s    LA WIDTH 3.60 cm    AORTIC VALVE CUSP SEPERATION 2 cm    TDI LATERAL 0.07 m/s    LVIDD 4.10 3.5 - 6.0 cm    IVS 1.30 0.6 - 1.1 cm    PW 1.30 0.6 - 1.1 cm    LVIDS 2.90 2.1 - 4.0 cm    FS 29 28 - 44 %    LA volume 52.30 cm3    Sinus 3.10 cm    LV mass 193.49 g    LA size 3.80 cm    TV peak E kristopher 0.50 m/s    Left Ventricle Relative Wall Thickness 0.63 cm    E/A ratio 0.75     Mean e' 0.08 m/s    E wave decelartion time 180.00 msec    Ao peak kristopher 1.0 m/s    AV peak gradient 4 mmHg    E/E' ratio 7.50 m/s    MV Peak E Kristopher 0.60 m/s    TR Max Kristopher 1.80 m/s    MV Peak A Kristopher 0.80 m/s    PV Peak S Kristopher 1.30 m/s    LA Volume Index 22.6 mL/m2    LV Mass Index 84 g/m2    Left Atrium Minor Axis 4.40 cm    Left Atrium Major Axis 4.60 cm    Triscuspid Valve Regurgitation Peak Gradient 13 mmHg    Right Atrial Pressure (from IVC) 3 mmHg    TV rest pulmonary artery pressure 16 mmHg       TSH 0.40 - 4.50 mIU/L 1.67 Resulting Agency [facility]   CBC auto differential   Order: [id number]   Status:  Final result   Visible to patient:  No (Not Released) Next appt:  08/21/2020 at 09:30 AM in Cardiology (Stephen Macias MD) Dx:  Lumbar spondylosis; Primary insomnia;...    Ref Range & Units 6d ago 3mo ago   WBC 3.8 - 10.8 Thousand/uL 4.6  4.0    RBC 4.20 - 5.80 Million/uL 5.03  5.05    Hemoglobin 13.2 - 17.1 g/dL 16.5  16.0    Hematocrit 38.5 - 50.0 % 46.7  46.3    Mean Corpuscular Volume 80.0 - 100.0 fL 92.8  91.7    Mean Corpuscular Hemoglobin 27.0 - 33.0 pg 32.8  31.7    Mean Corpuscular Hemoglobin Conc 32.0 - 36.0 g/dL 35.3  34.6    RDW 11.0 - 15.0 % 12.8  13.9    Platelets 140 - 400  Thousand/uL 255  238    MPV 7.5 - 12.5 fL 8.9  9.0    Neutrophils Absolute 1,500 - 7,800 cells/uL 1,812  1,480Low     Lymph # 850 - 3,900 cells/uL 2,346  2,020    Mono # 200 - 950 cells/uL 331  380    Eos # 15 - 500 cells/uL 92  100    Baso # 0 - 200 cells/uL 18  20    Neutrophils Relative % 39.4  37    Lymph% % 51.0  50.5    Mono% % 7.2  9.5    Eosinophil% % 2.0  2.5    Basophil% % 0.4  0.5    Resulting Agency  [facility]      Narrative   Performed by: [employer]   FASTING:NO  FASTING: NO   Resulting Agency's Comment           Glucose 65 - 139 mg/dL 129  75 CM   Comment:          Non-fasting reference interval       BUN, Bld 7 - 25 mg/dL 10  8    Creatinine 0.70 - 1.25 mg/dL 1.08  1.08 CM   Comment: For patients >49 years of age, the reference limit   for Creatinine is approximately 13% higher for people   identified as -American.       eGFR if non African American > OR = 60 mL/min/1.73m2 73  73    eGFR if African American > OR = 60 mL/min/1.73m2 85  85    BUN/Creatinine Ratio 6 - 22 (calc) NOT APPLICABLE  NOT APPLICABLE    Sodium 135 - 146 mmol/L 138  140    Potassium 3.5 - 5.3 mmol/L 4.0  3.9    Chloride 98 - 110 mmol/L 101  101    CO2 20 - 32 mmol/L 27  29    Calcium 8.6 - 10.3 mg/dL 9.3  9.5    Total Protein 6.1 - 8.1 g/dL 6.7  6.7    Albumin 3.6 - 5.1 g/dL 4.4  4.3    Globulin, Total 1.9 - 3.7 g/dL (calc) 2.3  2.4    Albumin/Globulin Ratio 1.0 - 2.5 (calc) 1.9  1.8    Total Bilirubin 0.2 - 1.2 mg/dL 0.4  0.3    Alkaline Phosphatase 35 - 144 U/L 91  115 R   AST 10 - 35 U/L 32  29    ALT 9 - 46 U/L 29  25    Resulting Agency  [facility]      Narrative   Performed by: [employer]   FASTING:NO  FASTING: NO     CRP <8.0 mg/L 1.5 2.1 Resulting Agency   Sed Rate < OR = 20 mm/h 2  2    Resulting Agency  [facility]      Narrative     LH 1.6 - 15.2 mIU/mL 8.1    Resulting Agency  [facility]      Narrative   Performed by: [facility]   FASTING:NO     FSH 1.6 - 8.0 mIU/mL 12.1High     Resulting Agency  [facility]      Narrative     DHEA  147 - 1,760 ng/dL 243 Comment:    This test was developed and its analytical performance     reviewed labs with patient during this visit         Assessment:       1. Lumbar spondylosis    2. Lumbar radiculopathy    3. DDD (degenerative disc disease), lumbar    4. Pain of left hip joint    5. Primary insomnia    6. Chronic pain syndrome            Plan:       Tera was seen today for disease management.    Diagnoses and all orders for this visit:    Lumbar spondylosis  -     HYDROcodone-acetaminophen (NORCO)  mg per tablet; TAKE ONE TABLET BY MOUTH EVERY 8 HOURS AS NEEDED **MAY CAUSE DROWSINESS**  -     celecoxib (CELEBREX) 200 MG capsule; Take 1 capsule (200 mg total) by mouth once daily.  -     CBC auto differential; Future  -     Comprehensive metabolic panel; Future  -     Sedimentation rate; Future  -     C-Reactive Protein; Future  -     Vitamin D; Future  -     eszopiclone (LUNESTA) 3 mg Tab; Take 1 tablet (3 mg total) by mouth nightly as needed.    Lumbar radiculopathy  -     HYDROcodone-acetaminophen (NORCO)  mg per tablet; TAKE ONE TABLET BY MOUTH EVERY 8 HOURS AS NEEDED **MAY CAUSE DROWSINESS**  -     celecoxib (CELEBREX) 200 MG capsule; Take 1 capsule (200 mg total) by mouth once daily.  -     CBC auto differential; Future  -     Comprehensive metabolic panel; Future  -     Sedimentation rate; Future  -     C-Reactive Protein; Future  -     Vitamin D; Future  -     eszopiclone (LUNESTA) 3 mg Tab; Take 1 tablet (3 mg total) by mouth nightly as needed.    DDD (degenerative disc disease), lumbar  -     HYDROcodone-acetaminophen (NORCO)  mg per tablet; TAKE ONE TABLET BY MOUTH EVERY 8 HOURS AS NEEDED **MAY CAUSE DROWSINESS**  -     celecoxib (CELEBREX) 200 MG capsule; Take 1 capsule (200 mg total) by mouth once daily.  -     CBC auto differential; Future  -     Comprehensive metabolic panel; Future  -     Sedimentation rate; Future  -     C-Reactive Protein; Future  -     Vitamin D;  Future  -     eszopiclone (LUNESTA) 3 mg Tab; Take 1 tablet (3 mg total) by mouth nightly as needed.    Pain of left hip joint  -     HYDROcodone-acetaminophen (NORCO)  mg per tablet; TAKE ONE TABLET BY MOUTH EVERY 8 HOURS AS NEEDED **MAY CAUSE DROWSINESS**  -     celecoxib (CELEBREX) 200 MG capsule; Take 1 capsule (200 mg total) by mouth once daily.  -     CBC auto differential; Future  -     Comprehensive metabolic panel; Future  -     Sedimentation rate; Future  -     C-Reactive Protein; Future  -     Vitamin D; Future  -     eszopiclone (LUNESTA) 3 mg Tab; Take 1 tablet (3 mg total) by mouth nightly as needed.    Primary insomnia  -     HYDROcodone-acetaminophen (NORCO)  mg per tablet; TAKE ONE TABLET BY MOUTH EVERY 8 HOURS AS NEEDED **MAY CAUSE DROWSINESS**  -     celecoxib (CELEBREX) 200 MG capsule; Take 1 capsule (200 mg total) by mouth once daily.  -     CBC auto differential; Future  -     Comprehensive metabolic panel; Future  -     Sedimentation rate; Future  -     C-Reactive Protein; Future  -     Vitamin D; Future  -     eszopiclone (LUNESTA) 3 mg Tab; Take 1 tablet (3 mg total) by mouth nightly as needed.    Chronic pain syndrome  -     HYDROcodone-acetaminophen (NORCO)  mg per tablet; TAKE ONE TABLET BY MOUTH EVERY 8 HOURS AS NEEDED **MAY CAUSE DROWSINESS**  -     celecoxib (CELEBREX) 200 MG capsule; Take 1 capsule (200 mg total) by mouth once daily.  -     CBC auto differential; Future  -     Comprehensive metabolic panel; Future  -     Sedimentation rate; Future  -     C-Reactive Protein; Future  -     Vitamin D; Future  -     eszopiclone (LUNESTA) 3 mg Tab; Take 1 tablet (3 mg total) by mouth nightly as needed.      [facility] f/[facility] in 4 months    The patient is aware that there is a COVID-19 pandemic and that the immunosuppressants being taken to treat arthritis can increased the  risk for infection/Viruses.  This patient understands  to hold (not to take) all  DMARD/Immunsuppressants with the exception of Plaquenil,  if having fever chills, cough, shortness of breath loss of sense of smell and or myalgias.  It is understood that the patient is to call the office as well as call their primary care physician and observe  social isolation      The patient location is: home  The chief complaint leading to consultation is:  oa , spinal stenosis  Visit type: audio only  Total time spent with patient: 21 min  Each patient to whom he or she provides medical services by telemedicine is:  (1) informed of the relationship between the physician and patient and the respective role of any other health care provider with respect to management of the patient; and (2) notified that he or she may decline to receive medical services by telemedicine and may withdraw from such care at any time.

## 2020-08-24 ENCOUNTER — TELEPHONE (OUTPATIENT)
Dept: RHEUMATOLOGY | Facility: CLINIC | Age: 65
End: 2020-08-24

## 2020-08-24 NOTE — TELEPHONE ENCOUNTER
----- Message from Naye Hatfield sent at 8/24/2020 10:37 AM CDT -----  Pt called asking if the appointment he has for 8/26/2020 is still on and if not please reach out to him to let him know at [phone]

## 2020-08-24 NOTE — TELEPHONE ENCOUNTER
Attempted to return patient call regarding up coming appointment. Left a message that as of right now office will be open on 8/26. Advised to contact office with any questions.

## 2020-08-26 ENCOUNTER — OFFICE VISIT (OUTPATIENT)
Dept: RHEUMATOLOGY | Facility: CLINIC | Age: 65
End: 2020-08-26
Payer: COMMERCIAL

## 2020-08-26 DIAGNOSIS — G89.4 CHRONIC PAIN SYNDROME: ICD-10-CM

## 2020-08-26 DIAGNOSIS — M51.36 DDD (DEGENERATIVE DISC DISEASE), LUMBAR: ICD-10-CM

## 2020-08-26 DIAGNOSIS — M54.16 LUMBAR RADICULOPATHY: ICD-10-CM

## 2020-08-26 DIAGNOSIS — M47.816 LUMBAR SPONDYLOSIS: ICD-10-CM

## 2020-08-26 DIAGNOSIS — F51.01 PRIMARY INSOMNIA: ICD-10-CM

## 2020-08-26 DIAGNOSIS — M47.816 LUMBAR SPONDYLOSIS: Primary | ICD-10-CM

## 2020-08-26 DIAGNOSIS — M25.552 PAIN OF LEFT HIP JOINT: ICD-10-CM

## 2020-08-26 PROCEDURE — 99214 PR OFFICE/OUTPT VISIT, EST, LEVL IV, 30-39 MIN: ICD-10-PCS | Mod: 95,,, | Performed by: INTERNAL MEDICINE

## 2020-08-26 PROCEDURE — 99214 OFFICE O/P EST MOD 30 MIN: CPT | Mod: 95,,, | Performed by: INTERNAL MEDICINE

## 2020-08-26 RX ORDER — HYDROCODONE BITARTRATE AND ACETAMINOPHEN 10; 325 MG/1; MG/1
TABLET ORAL
Qty: 90 TABLET | Refills: 0 | OUTPATIENT
Start: 2020-08-26

## 2020-08-26 RX ORDER — HYDROCODONE BITARTRATE AND ACETAMINOPHEN 10; 325 MG/1; MG/1
1 TABLET ORAL EVERY 8 HOURS PRN
Qty: 90 TABLET | Refills: 0 | Status: SHIPPED | OUTPATIENT
Start: 2020-08-26 | End: 2020-09-25

## 2020-08-26 RX ORDER — HYDROCODONE BITARTRATE AND ACETAMINOPHEN 10; 325 MG/1; MG/1
TABLET ORAL
Qty: 90 TABLET | Refills: 0 | Status: CANCELLED | OUTPATIENT
Start: 2020-08-26

## 2020-08-26 NOTE — TELEPHONE ENCOUNTER
----- Message from Raheem Bonilla sent at 8/26/2020 10:38 AM CDT -----  Type:  RX Refill Request    Who Called:  Patient  Refill or New Rx:  Refill  RX Name and Strength:  HYDROcodone-acetaminophen (NORCO)  mg per tablet      How is the patient currently taking it? (ex. 1XDay):  TAKE ONE TABLET BY MOUTH EVERY 8 HOURS AS NEEDED ##MAY CAUSE DROWSINESS  Is this a 30 day or 90 day RX:  90 Tablets    Preferred Pharmacy with phone number:   [facility] - [address] [address]  Phone: [phone] Fax: [phone]      Local or Mail Order: Local   Ordering Provider:  Claribel Walker Call Back Number:  [phone]  Additional Information:

## 2020-08-26 NOTE — PROGRESS NOTES
Subjective:       Patient ID: Tera Marquez is a 64 y.o. male.    Chief Complaint: No chief complaint on file.    Follow up: 62 yo male with lumbar sacral arthritis,he had 2  Level fusion and now cervical spine  Surgery  9/11/20 he still has hip pain and needs a  Replacement.   Residual neuropathy of the toes, hand pain and feet pain, synovitis of the dip pip, B, celebrex helping.  Patient is under some stress taking care of her grandson because the son-in-law's hospitalized with COVID and artery is at home with COVID    Review of Systems   Constitutional: Positive for activity change and fatigue. Negative for appetite change, chills, diaphoresis and unexpected weight change.   HENT: Negative for congestion, ear pain, facial swelling, mouth sores, nosebleeds, postnasal drip, rhinorrhea, sinus pressure, sneezing, sore throat, tinnitus and voice change.    Eyes: Negative for pain, discharge, redness, itching and visual disturbance.   Respiratory: Negative for apnea, cough, chest tightness, shortness of breath and wheezing.    Cardiovascular: Negative for chest pain, palpitations and leg swelling.   Gastrointestinal: Negative for abdominal pain, constipation, diarrhea, nausea and vomiting.   Endocrine: Negative for cold intolerance, heat intolerance, polydipsia and polyuria.   Genitourinary: Negative for decreased urine volume, difficulty urinating, flank pain, frequency, hematuria and urgency.   Musculoskeletal: Positive for arthralgias, back pain, gait problem, joint swelling, neck pain and neck stiffness.   Skin: Positive for rash. Negative for pallor and wound.   Allergic/Immunologic: Negative for immunocompromised state.   Neurological: Negative for dizziness, tremors, seizures, syncope, weakness and numbness.   Hematological: Negative for adenopathy. Does not bruise/bleed easily.   Psychiatric/Behavioral: Negative for sleep disturbance and suicidal ideas. The patient is not nervous/anxious.          Objective:    There were no vitals taken for this visit.     Physical Exam   Constitutional: He is oriented to person, place, and time and well-developed, well-nourished, and in no distress.   Neurological: He is alert and oriented to person, place, and time.   Psychiatric: Mood, affect and judgment normal.           reviewed labs with patient during this visit     Assessment:       1. Lumbar spondylosis    2. Lumbar radiculopathy    3. DDD (degenerative disc disease), lumbar    4. Chronic pain syndrome    5. Pain of left hip joint    6. Primary insomnia            Plan:       Diagnoses and all orders for this visit:    Lumbar spondylosis  -     HYDROcodone-acetaminophen (NORCO)  mg per tablet; Take 1 tablet by mouth every 8 (eight) hours as needed for Pain. 1 tab po tid prn pain  -     CBC auto differential; Future  -     Comprehensive metabolic panel; Future  -     Sedimentation rate; Future  -     C-Reactive Protein; Future    Lumbar radiculopathy  -     HYDROcodone-acetaminophen (NORCO)  mg per tablet; Take 1 tablet by mouth every 8 (eight) hours as needed for Pain. 1 tab po tid prn pain  -     CBC auto differential; Future  -     Comprehensive metabolic panel; Future  -     Sedimentation rate; Future  -     C-Reactive Protein; Future    DDD (degenerative disc disease), lumbar  -     HYDROcodone-acetaminophen (NORCO)  mg per tablet; Take 1 tablet by mouth every 8 (eight) hours as needed for Pain. 1 tab po tid prn pain  -     CBC auto differential; Future  -     Comprehensive metabolic panel; Future  -     Sedimentation rate; Future  -     C-Reactive Protein; Future    Chronic pain syndrome  -     HYDROcodone-acetaminophen (NORCO)  mg per tablet; Take 1 tablet by mouth every 8 (eight) hours as needed for Pain. 1 tab po tid prn pain  -     CBC auto differential; Future  -     Comprehensive metabolic panel; Future  -     Sedimentation rate; Future  -     C-Reactive Protein; Future    Pain of left hip  joint  -     HYDROcodone-acetaminophen (NORCO)  mg per tablet; Take 1 tablet by mouth every 8 (eight) hours as needed for Pain. 1 tab po tid prn pain  -     CBC auto differential; Future  -     Comprehensive metabolic panel; Future  -     Sedimentation rate; Future  -     C-Reactive Protein; Future    Primary insomnia  -     HYDROcodone-acetaminophen (NORCO)  mg per tablet; Take 1 tablet by mouth every 8 (eight) hours as needed for Pain. 1 tab po tid prn pain  -     CBC auto differential; Future  -     Comprehensive metabolic panel; Future  -     Sedimentation rate; Future  -     C-Reactive Protein; Future      1.  Patient is scheduled for cervical spine fusion on 09/11/2020  2.  Will continue Norco 3 times a day as needed for pain  Continue the Celebrex 1 tablet daily  3.I have  check [place] prescription monitoring program site and no unusual or abnormal behavior has occurred pt understand the risk and benefits of taking opioid medications and has decided to continue the  medication   4   The complexity of care for this patient was moderate to severe due to the disease state and the medications required to treat the disease.    The patient location is: home  The chief complaint leading to consultation is: oa    Visit type: audiovisual    Face to Face time with patient: 35   minutes of total time spent on the encounter, which includes face to face time and non-face to face time preparing to see the patient (eg, review of tests), Obtaining and/or reviewing separately obtained history, Documenting clinical information in the electronic or other health record, Independently interpreting results (not separately reported) and communicating results to the patient/family/caregiver, or Care coordination (not separately reported).         Each patient to whom he or she provides medical services by telemedicine is:  (1) informed of the relationship between the physician and patient and the respective role of  any other health care provider with respect to management of the patient; and (2) notified that he or she may decline to receive medical services by telemedicine and may withdraw from such care at any time.    Notes:

## 2021-02-01 ENCOUNTER — TELEPHONE (OUTPATIENT)
Dept: RHEUMATOLOGY | Facility: CLINIC | Age: 66
End: 2021-02-01

## 2021-02-10 ENCOUNTER — OFFICE VISIT (OUTPATIENT)
Dept: RHEUMATOLOGY | Facility: CLINIC | Age: 66
End: 2021-02-10
Payer: COMMERCIAL

## 2021-02-10 VITALS
DIASTOLIC BLOOD PRESSURE: 88 MMHG | HEART RATE: 89 BPM | HEIGHT: 75 IN | SYSTOLIC BLOOD PRESSURE: 147 MMHG | WEIGHT: 235 LBS | BODY MASS INDEX: 29.22 KG/M2

## 2021-02-10 DIAGNOSIS — M51.36 DDD (DEGENERATIVE DISC DISEASE), LUMBAR: ICD-10-CM

## 2021-02-10 DIAGNOSIS — M54.16 LUMBAR RADICULOPATHY: ICD-10-CM

## 2021-02-10 DIAGNOSIS — G89.4 CHRONIC PAIN SYNDROME: ICD-10-CM

## 2021-02-10 DIAGNOSIS — M47.816 LUMBAR SPONDYLOSIS: Primary | ICD-10-CM

## 2021-02-10 DIAGNOSIS — F51.01 PRIMARY INSOMNIA: ICD-10-CM

## 2021-02-10 PROCEDURE — 3008F BODY MASS INDEX DOCD: CPT | Mod: CPTII,S$GLB,, | Performed by: INTERNAL MEDICINE

## 2021-02-10 PROCEDURE — 96372 PR INJECTION,THERAP/PROPH/DIAG2ST, IM OR SUBCUT: ICD-10-PCS | Mod: S$GLB,,, | Performed by: INTERNAL MEDICINE

## 2021-02-10 PROCEDURE — 3008F PR BODY MASS INDEX (BMI) DOCUMENTED: ICD-10-PCS | Mod: CPTII,S$GLB,, | Performed by: INTERNAL MEDICINE

## 2021-02-10 PROCEDURE — 3077F PR MOST RECENT SYSTOLIC BLOOD PRESSURE >= 140 MM HG: ICD-10-PCS | Mod: CPTII,S$GLB,, | Performed by: INTERNAL MEDICINE

## 2021-02-10 PROCEDURE — 3079F DIAST BP 80-89 MM HG: CPT | Mod: CPTII,S$GLB,, | Performed by: INTERNAL MEDICINE

## 2021-02-10 PROCEDURE — 1125F PR PAIN SEVERITY QUANTIFIED, PAIN PRESENT: ICD-10-PCS | Mod: S$GLB,,, | Performed by: INTERNAL MEDICINE

## 2021-02-10 PROCEDURE — 99215 OFFICE O/P EST HI 40 MIN: CPT | Mod: 25,S$GLB,, | Performed by: INTERNAL MEDICINE

## 2021-02-10 PROCEDURE — 3079F PR MOST RECENT DIASTOLIC BLOOD PRESSURE 80-89 MM HG: ICD-10-PCS | Mod: CPTII,S$GLB,, | Performed by: INTERNAL MEDICINE

## 2021-02-10 PROCEDURE — 99215 PR OFFICE/OUTPT VISIT, EST, LEVL V, 40-54 MIN: ICD-10-PCS | Mod: 25,S$GLB,, | Performed by: INTERNAL MEDICINE

## 2021-02-10 PROCEDURE — 96372 THER/PROPH/DIAG INJ SC/IM: CPT | Mod: S$GLB,,, | Performed by: INTERNAL MEDICINE

## 2021-02-10 PROCEDURE — 99999 PR PBB SHADOW E&M-EST. PATIENT-LVL III: ICD-10-PCS | Mod: PBBFAC,,, | Performed by: INTERNAL MEDICINE

## 2021-02-10 PROCEDURE — 99999 PR PBB SHADOW E&M-EST. PATIENT-LVL III: CPT | Mod: PBBFAC,,, | Performed by: INTERNAL MEDICINE

## 2021-02-10 PROCEDURE — 1125F AMNT PAIN NOTED PAIN PRSNT: CPT | Mod: S$GLB,,, | Performed by: INTERNAL MEDICINE

## 2021-02-10 PROCEDURE — 3077F SYST BP >= 140 MM HG: CPT | Mod: CPTII,S$GLB,, | Performed by: INTERNAL MEDICINE

## 2021-02-10 RX ORDER — HYDROCODONE BITARTRATE AND ACETAMINOPHEN 10; 325 MG/1; MG/1
1 TABLET ORAL EVERY 8 HOURS PRN
Qty: 90 TABLET | Refills: 0 | Status: SHIPPED | OUTPATIENT
Start: 2021-02-10 | End: 2021-03-12

## 2021-02-10 RX ORDER — ESZOPICLONE 3 MG/1
TABLET, FILM COATED ORAL
COMMUNITY
Start: 2021-02-08 | End: 2021-02-10 | Stop reason: SDUPTHER

## 2021-02-10 RX ORDER — PANTOPRAZOLE SODIUM 40 MG/1
TABLET, DELAYED RELEASE ORAL
COMMUNITY
Start: 2021-02-04

## 2021-02-10 RX ORDER — ESZOPICLONE 3 MG/1
TABLET, FILM COATED ORAL
Qty: 30 TABLET | Refills: 4 | Status: SHIPPED | OUTPATIENT
Start: 2021-02-10 | End: 2021-08-19 | Stop reason: SDUPTHER

## 2021-02-10 RX ORDER — KETOROLAC TROMETHAMINE 30 MG/ML
60 INJECTION, SOLUTION INTRAMUSCULAR; INTRAVENOUS
Status: COMPLETED | OUTPATIENT
Start: 2021-02-10 | End: 2021-02-10

## 2021-02-10 RX ORDER — HYDROCODONE BITARTRATE AND ACETAMINOPHEN 10; 325 MG/1; MG/1
1 TABLET ORAL EVERY 6 HOURS PRN
COMMUNITY
Start: 2021-01-27 | End: 2021-02-10 | Stop reason: SDUPTHER

## 2021-02-10 RX ORDER — METHYLPREDNISOLONE ACETATE 80 MG/ML
80 INJECTION, SUSPENSION INTRA-ARTICULAR; INTRALESIONAL; INTRAMUSCULAR; SOFT TISSUE
Status: COMPLETED | OUTPATIENT
Start: 2021-02-10 | End: 2021-02-10

## 2021-02-10 RX ADMIN — KETOROLAC TROMETHAMINE 60 MG: 30 INJECTION, SOLUTION INTRAMUSCULAR; INTRAVENOUS at 04:02

## 2021-02-10 RX ADMIN — METHYLPREDNISOLONE ACETATE 80 MG: 80 INJECTION, SUSPENSION INTRA-ARTICULAR; INTRALESIONAL; INTRAMUSCULAR; SOFT TISSUE at 04:02

## 2021-03-11 DIAGNOSIS — M51.36 DDD (DEGENERATIVE DISC DISEASE), LUMBAR: ICD-10-CM

## 2021-03-11 DIAGNOSIS — F51.01 PRIMARY INSOMNIA: ICD-10-CM

## 2021-03-11 DIAGNOSIS — G89.4 CHRONIC PAIN SYNDROME: ICD-10-CM

## 2021-03-11 DIAGNOSIS — M54.16 LUMBAR RADICULOPATHY: ICD-10-CM

## 2021-03-11 DIAGNOSIS — M47.816 LUMBAR SPONDYLOSIS: ICD-10-CM

## 2021-03-12 ENCOUNTER — PATIENT MESSAGE (OUTPATIENT)
Dept: RHEUMATOLOGY | Facility: CLINIC | Age: 66
End: 2021-03-12

## 2021-03-12 DIAGNOSIS — F51.01 PRIMARY INSOMNIA: ICD-10-CM

## 2021-03-12 DIAGNOSIS — M47.816 LUMBAR SPONDYLOSIS: ICD-10-CM

## 2021-03-12 DIAGNOSIS — M54.16 LUMBAR RADICULOPATHY: ICD-10-CM

## 2021-03-12 DIAGNOSIS — G89.4 CHRONIC PAIN SYNDROME: ICD-10-CM

## 2021-03-12 DIAGNOSIS — M51.36 DDD (DEGENERATIVE DISC DISEASE), LUMBAR: ICD-10-CM

## 2021-03-12 RX ORDER — HYDROCODONE BITARTRATE AND ACETAMINOPHEN 10; 325 MG/1; MG/1
1 TABLET ORAL EVERY 8 HOURS PRN
Qty: 90 TABLET | Refills: 0 | OUTPATIENT
Start: 2021-03-12 | End: 2021-04-11

## 2021-03-12 RX ORDER — HYDROCODONE BITARTRATE AND ACETAMINOPHEN 10; 325 MG/1; MG/1
TABLET ORAL
Qty: 90 TABLET | Refills: 0 | Status: SHIPPED | OUTPATIENT
Start: 2021-03-12 | End: 2021-04-17

## 2021-04-12 DIAGNOSIS — F51.01 PRIMARY INSOMNIA: ICD-10-CM

## 2021-04-12 DIAGNOSIS — G89.4 CHRONIC PAIN SYNDROME: ICD-10-CM

## 2021-04-12 DIAGNOSIS — M51.36 DDD (DEGENERATIVE DISC DISEASE), LUMBAR: ICD-10-CM

## 2021-04-12 DIAGNOSIS — M54.16 LUMBAR RADICULOPATHY: ICD-10-CM

## 2021-04-12 DIAGNOSIS — M47.816 LUMBAR SPONDYLOSIS: ICD-10-CM

## 2021-04-14 ENCOUNTER — TELEPHONE (OUTPATIENT)
Dept: RHEUMATOLOGY | Facility: CLINIC | Age: 66
End: 2021-04-14

## 2021-04-16 ENCOUNTER — TELEPHONE (OUTPATIENT)
Dept: RHEUMATOLOGY | Facility: CLINIC | Age: 66
End: 2021-04-16

## 2021-04-17 RX ORDER — HYDROCODONE BITARTRATE AND ACETAMINOPHEN 10; 325 MG/1; MG/1
TABLET ORAL
Qty: 90 TABLET | Refills: 0 | Status: SHIPPED | OUTPATIENT
Start: 2021-04-17 | End: 2021-05-20 | Stop reason: SDUPTHER

## 2021-04-18 ENCOUNTER — PATIENT MESSAGE (OUTPATIENT)
Dept: RHEUMATOLOGY | Facility: CLINIC | Age: 66
End: 2021-04-18

## 2021-05-04 ENCOUNTER — PATIENT MESSAGE (OUTPATIENT)
Dept: RESEARCH | Facility: HOSPITAL | Age: 66
End: 2021-05-04

## 2021-05-20 ENCOUNTER — PATIENT MESSAGE (OUTPATIENT)
Dept: RHEUMATOLOGY | Facility: CLINIC | Age: 66
End: 2021-05-20

## 2021-05-20 DIAGNOSIS — M51.36 DDD (DEGENERATIVE DISC DISEASE), LUMBAR: ICD-10-CM

## 2021-05-20 DIAGNOSIS — G89.4 CHRONIC PAIN SYNDROME: ICD-10-CM

## 2021-05-20 DIAGNOSIS — M54.16 LUMBAR RADICULOPATHY: ICD-10-CM

## 2021-05-20 DIAGNOSIS — F51.01 PRIMARY INSOMNIA: ICD-10-CM

## 2021-05-20 DIAGNOSIS — M47.816 LUMBAR SPONDYLOSIS: ICD-10-CM

## 2021-05-22 RX ORDER — HYDROCODONE BITARTRATE AND ACETAMINOPHEN 10; 325 MG/1; MG/1
1 TABLET ORAL EVERY 6 HOURS PRN
Qty: 120 TABLET | Refills: 0 | Status: SHIPPED | OUTPATIENT
Start: 2021-05-22 | End: 2021-06-21

## 2021-07-04 ENCOUNTER — PATIENT MESSAGE (OUTPATIENT)
Dept: RHEUMATOLOGY | Facility: CLINIC | Age: 66
End: 2021-07-04

## 2021-07-04 DIAGNOSIS — G89.4 CHRONIC PAIN SYNDROME: Primary | ICD-10-CM

## 2021-07-07 RX ORDER — HYDROCODONE BITARTRATE AND ACETAMINOPHEN 10; 325 MG/1; MG/1
1 TABLET ORAL EVERY 6 HOURS PRN
Qty: 120 TABLET | Refills: 0 | Status: SHIPPED | OUTPATIENT
Start: 2021-07-07 | End: 2021-08-19 | Stop reason: SDUPTHER

## 2021-08-10 LAB
25(OH)D3 SERPL-MCNC: 90 NG/ML (ref 30–100)
ALBUMIN SERPL-MCNC: 4.4 G/DL (ref 3.6–5.1)
ALBUMIN/GLOB SERPL: 1.6 (CALC) (ref 1–2.5)
ALP SERPL-CCNC: 84 U/L (ref 35–144)
ALT SERPL-CCNC: 29 U/L (ref 9–46)
AST SERPL-CCNC: 30 U/L (ref 10–35)
BASOPHILS # BLD AUTO: 22 CELLS/UL (ref 0–200)
BASOPHILS NFR BLD AUTO: 0.5 %
BILIRUB SERPL-MCNC: 0.5 MG/DL (ref 0.2–1.2)
BUN SERPL-MCNC: 18 MG/DL (ref 7–25)
BUN/CREAT SERPL: NORMAL (CALC) (ref 6–22)
CALCIUM SERPL-MCNC: 9.8 MG/DL (ref 8.6–10.3)
CHLORIDE SERPL-SCNC: 102 MMOL/L (ref 98–110)
CO2 SERPL-SCNC: 28 MMOL/L (ref 20–32)
CREAT SERPL-MCNC: 1.2 MG/DL (ref 0.7–1.25)
CRP SERPL-MCNC: 3 MG/L
EOSINOPHIL # BLD AUTO: 99 CELLS/UL (ref 15–500)
EOSINOPHIL NFR BLD AUTO: 2.3 %
ERYTHROCYTE [DISTWIDTH] IN BLOOD BY AUTOMATED COUNT: 12.7 % (ref 11–15)
ERYTHROCYTE [SEDIMENTATION RATE] IN BLOOD BY WESTERGREN METHOD: 2 MM/H
GLOBULIN SER CALC-MCNC: 2.8 G/DL (CALC) (ref 1.9–3.7)
GLUCOSE SERPL-MCNC: 95 MG/DL (ref 65–99)
HCT VFR BLD AUTO: 47.2 % (ref 38.5–50)
HGB BLD-MCNC: 16 G/DL (ref 13.2–17.1)
LYMPHOCYTES # BLD AUTO: 1905 CELLS/UL (ref 850–3900)
LYMPHOCYTES NFR BLD AUTO: 44.3 %
MCH RBC QN AUTO: 32.1 PG (ref 27–33)
MCHC RBC AUTO-ENTMCNC: 33.9 G/DL (ref 32–36)
MCV RBC AUTO: 94.6 FL (ref 80–100)
MONOCYTES # BLD AUTO: 421 CELLS/UL (ref 200–950)
MONOCYTES NFR BLD AUTO: 9.8 %
NEUTROPHILS # BLD AUTO: 1853 CELLS/UL (ref 1500–7800)
NEUTROPHILS NFR BLD AUTO: 43.1 %
PLATELET # BLD AUTO: 219 THOUSAND/UL (ref 140–400)
PMV BLD REES-ECKER: 8.8 FL (ref 7.5–12.5)
POTASSIUM SERPL-SCNC: 4.2 MMOL/L (ref 3.5–5.3)
PROT SERPL-MCNC: 7.2 G/DL (ref 6.1–8.1)
RBC # BLD AUTO: 4.99 MILLION/UL (ref 4.2–5.8)
SODIUM SERPL-SCNC: 140 MMOL/L (ref 135–146)
TSH SERPL-ACNC: 1.66 MIU/L (ref 0.4–4.5)
WBC # BLD AUTO: 4.3 THOUSAND/UL (ref 3.8–10.8)

## 2021-08-19 ENCOUNTER — OFFICE VISIT (OUTPATIENT)
Dept: RHEUMATOLOGY | Facility: CLINIC | Age: 66
End: 2021-08-19
Payer: COMMERCIAL

## 2021-08-19 VITALS
BODY MASS INDEX: 28.72 KG/M2 | HEIGHT: 75 IN | DIASTOLIC BLOOD PRESSURE: 92 MMHG | HEART RATE: 91 BPM | SYSTOLIC BLOOD PRESSURE: 164 MMHG | WEIGHT: 231 LBS

## 2021-08-19 DIAGNOSIS — M51.36 DDD (DEGENERATIVE DISC DISEASE), LUMBAR: ICD-10-CM

## 2021-08-19 DIAGNOSIS — F51.01 PRIMARY INSOMNIA: ICD-10-CM

## 2021-08-19 DIAGNOSIS — G89.4 CHRONIC PAIN SYNDROME: ICD-10-CM

## 2021-08-19 DIAGNOSIS — M47.816 LUMBAR SPONDYLOSIS: ICD-10-CM

## 2021-08-19 DIAGNOSIS — G57.53 TARSAL TUNNEL SYNDROME OF BOTH LOWER EXTREMITIES: ICD-10-CM

## 2021-08-19 DIAGNOSIS — M54.16 LUMBAR RADICULOPATHY: ICD-10-CM

## 2021-08-19 DIAGNOSIS — M76.60 ACHILLES TENDINITIS, UNSPECIFIED LATERALITY: Primary | ICD-10-CM

## 2021-08-19 PROCEDURE — 1159F PR MEDICATION LIST DOCUMENTED IN MEDICAL RECORD: ICD-10-PCS | Mod: CPTII,S$GLB,, | Performed by: INTERNAL MEDICINE

## 2021-08-19 PROCEDURE — 3008F BODY MASS INDEX DOCD: CPT | Mod: CPTII,S$GLB,, | Performed by: INTERNAL MEDICINE

## 2021-08-19 PROCEDURE — 3077F SYST BP >= 140 MM HG: CPT | Mod: CPTII,S$GLB,, | Performed by: INTERNAL MEDICINE

## 2021-08-19 PROCEDURE — 96372 PR INJECTION,THERAP/PROPH/DIAG2ST, IM OR SUBCUT: ICD-10-PCS | Mod: S$GLB,,, | Performed by: INTERNAL MEDICINE

## 2021-08-19 PROCEDURE — 96372 THER/PROPH/DIAG INJ SC/IM: CPT | Mod: S$GLB,,, | Performed by: INTERNAL MEDICINE

## 2021-08-19 PROCEDURE — 99999 PR PBB SHADOW E&M-EST. PATIENT-LVL III: CPT | Mod: PBBFAC,,, | Performed by: INTERNAL MEDICINE

## 2021-08-19 PROCEDURE — 3008F PR BODY MASS INDEX (BMI) DOCUMENTED: ICD-10-PCS | Mod: CPTII,S$GLB,, | Performed by: INTERNAL MEDICINE

## 2021-08-19 PROCEDURE — 99215 OFFICE O/P EST HI 40 MIN: CPT | Mod: 25,S$GLB,, | Performed by: INTERNAL MEDICINE

## 2021-08-19 PROCEDURE — 3080F DIAST BP >= 90 MM HG: CPT | Mod: CPTII,S$GLB,, | Performed by: INTERNAL MEDICINE

## 2021-08-19 PROCEDURE — 99999 PR PBB SHADOW E&M-EST. PATIENT-LVL III: ICD-10-PCS | Mod: PBBFAC,,, | Performed by: INTERNAL MEDICINE

## 2021-08-19 PROCEDURE — 3080F PR MOST RECENT DIASTOLIC BLOOD PRESSURE >= 90 MM HG: ICD-10-PCS | Mod: CPTII,S$GLB,, | Performed by: INTERNAL MEDICINE

## 2021-08-19 PROCEDURE — 1159F MED LIST DOCD IN RCRD: CPT | Mod: CPTII,S$GLB,, | Performed by: INTERNAL MEDICINE

## 2021-08-19 PROCEDURE — 99215 PR OFFICE/OUTPT VISIT, EST, LEVL V, 40-54 MIN: ICD-10-PCS | Mod: 25,S$GLB,, | Performed by: INTERNAL MEDICINE

## 2021-08-19 PROCEDURE — 3077F PR MOST RECENT SYSTOLIC BLOOD PRESSURE >= 140 MM HG: ICD-10-PCS | Mod: CPTII,S$GLB,, | Performed by: INTERNAL MEDICINE

## 2021-08-19 RX ORDER — PREGABALIN 50 MG/1
50 CAPSULE ORAL 3 TIMES DAILY
Qty: 90 CAPSULE | Refills: 6 | Status: SHIPPED | OUTPATIENT
Start: 2021-08-19 | End: 2022-02-02

## 2021-08-19 RX ORDER — CYANOCOBALAMIN 1000 UG/ML
1000 INJECTION, SOLUTION INTRAMUSCULAR; SUBCUTANEOUS
Status: COMPLETED | OUTPATIENT
Start: 2021-08-19 | End: 2021-08-19

## 2021-08-19 RX ORDER — HYDROCODONE BITARTRATE AND ACETAMINOPHEN 10; 325 MG/1; MG/1
1 TABLET ORAL EVERY 6 HOURS PRN
Qty: 120 TABLET | Refills: 0 | Status: SHIPPED | OUTPATIENT
Start: 2021-08-19 | End: 2021-12-04

## 2021-08-19 RX ORDER — HYDROCODONE BITARTRATE AND ACETAMINOPHEN 10; 325 MG/1; MG/1
1 TABLET ORAL EVERY 6 HOURS PRN
Qty: 120 TABLET | Refills: 0 | Status: SHIPPED | OUTPATIENT
Start: 2021-09-17 | End: 2021-10-17

## 2021-08-19 RX ORDER — ESZOPICLONE 3 MG/1
TABLET, FILM COATED ORAL
Qty: 30 TABLET | Refills: 4 | Status: SHIPPED | OUTPATIENT
Start: 2021-08-19 | End: 2022-03-05

## 2021-08-19 RX ORDER — HYDROCODONE BITARTRATE AND ACETAMINOPHEN 10; 325 MG/1; MG/1
1 TABLET ORAL EVERY 6 HOURS PRN
Qty: 120 TABLET | Refills: 0 | Status: SHIPPED | OUTPATIENT
Start: 2021-10-15 | End: 2021-11-14

## 2021-08-19 RX ORDER — CETIRIZINE HYDROCHLORIDE, PSEUDOEPHEDRINE HYDROCHLORIDE 5; 120 MG/1; MG/1
TABLET, FILM COATED, EXTENDED RELEASE ORAL
COMMUNITY

## 2021-08-19 RX ORDER — KETOROLAC TROMETHAMINE 30 MG/ML
60 INJECTION, SOLUTION INTRAMUSCULAR; INTRAVENOUS
Status: COMPLETED | OUTPATIENT
Start: 2021-08-19 | End: 2021-08-19

## 2021-08-19 RX ORDER — CYANOCOBALAMIN/FOLIC AC/VIT B6 1-2.5-25MG
1 TABLET ORAL DAILY
Qty: 90 EACH | Refills: 3 | Status: SHIPPED | OUTPATIENT
Start: 2021-08-19 | End: 2021-11-17

## 2021-08-19 RX ORDER — CYANOCOBALAMIN/FOLIC AC/VIT B6 1-2.5-25MG
1 TABLET ORAL DAILY
Qty: 90 EACH | Refills: 3 | Status: SHIPPED | OUTPATIENT
Start: 2021-08-19 | End: 2021-08-19 | Stop reason: SDUPTHER

## 2021-08-19 RX ADMIN — CYANOCOBALAMIN 1000 MCG: 1000 INJECTION, SOLUTION INTRAMUSCULAR; SUBCUTANEOUS at 10:08

## 2021-08-19 RX ADMIN — KETOROLAC TROMETHAMINE 60 MG: 30 INJECTION, SOLUTION INTRAMUSCULAR; INTRAVENOUS at 10:08

## 2021-12-03 DIAGNOSIS — M54.16 LUMBAR RADICULOPATHY: ICD-10-CM

## 2021-12-03 DIAGNOSIS — G89.4 CHRONIC PAIN SYNDROME: ICD-10-CM

## 2021-12-03 DIAGNOSIS — G57.53 TARSAL TUNNEL SYNDROME OF BOTH LOWER EXTREMITIES: ICD-10-CM

## 2021-12-03 DIAGNOSIS — M47.816 LUMBAR SPONDYLOSIS: ICD-10-CM

## 2021-12-03 DIAGNOSIS — M51.36 DDD (DEGENERATIVE DISC DISEASE), LUMBAR: ICD-10-CM

## 2021-12-03 DIAGNOSIS — M76.60 ACHILLES TENDINITIS, UNSPECIFIED LATERALITY: ICD-10-CM

## 2021-12-03 DIAGNOSIS — F51.01 PRIMARY INSOMNIA: ICD-10-CM

## 2021-12-04 RX ORDER — HYDROCODONE BITARTRATE AND ACETAMINOPHEN 10; 325 MG/1; MG/1
TABLET ORAL
Qty: 120 TABLET | Refills: 0 | Status: SHIPPED | OUTPATIENT
Start: 2021-12-04 | End: 2022-03-25 | Stop reason: SDUPTHER

## 2022-02-02 DIAGNOSIS — G89.4 CHRONIC PAIN SYNDROME: ICD-10-CM

## 2022-02-02 DIAGNOSIS — M47.816 LUMBAR SPONDYLOSIS: ICD-10-CM

## 2022-02-02 DIAGNOSIS — G57.53 TARSAL TUNNEL SYNDROME OF BOTH LOWER EXTREMITIES: ICD-10-CM

## 2022-02-02 DIAGNOSIS — F51.01 PRIMARY INSOMNIA: ICD-10-CM

## 2022-02-02 DIAGNOSIS — M76.60 ACHILLES TENDINITIS, UNSPECIFIED LATERALITY: ICD-10-CM

## 2022-02-02 DIAGNOSIS — M51.36 DDD (DEGENERATIVE DISC DISEASE), LUMBAR: ICD-10-CM

## 2022-02-02 DIAGNOSIS — M54.16 LUMBAR RADICULOPATHY: ICD-10-CM

## 2022-02-02 RX ORDER — PREGABALIN 50 MG/1
50 CAPSULE ORAL 3 TIMES DAILY
Qty: 90 CAPSULE | Refills: 5 | Status: SHIPPED | OUTPATIENT
Start: 2022-02-02 | End: 2022-09-04

## 2022-03-10 ENCOUNTER — PATIENT MESSAGE (OUTPATIENT)
Dept: RHEUMATOLOGY | Facility: CLINIC | Age: 67
End: 2022-03-10
Payer: COMMERCIAL

## 2022-03-25 ENCOUNTER — OFFICE VISIT (OUTPATIENT)
Dept: RHEUMATOLOGY | Facility: CLINIC | Age: 67
End: 2022-03-25
Payer: COMMERCIAL

## 2022-03-25 VITALS
HEART RATE: 80 BPM | BODY MASS INDEX: 28.65 KG/M2 | DIASTOLIC BLOOD PRESSURE: 85 MMHG | HEIGHT: 75 IN | SYSTOLIC BLOOD PRESSURE: 155 MMHG | WEIGHT: 230.38 LBS

## 2022-03-25 DIAGNOSIS — G89.29 CHRONIC PAIN OF BOTH KNEES: Primary | ICD-10-CM

## 2022-03-25 DIAGNOSIS — G89.4 CHRONIC PAIN SYNDROME: ICD-10-CM

## 2022-03-25 DIAGNOSIS — M51.36 DDD (DEGENERATIVE DISC DISEASE), LUMBAR: ICD-10-CM

## 2022-03-25 DIAGNOSIS — M25.561 CHRONIC PAIN OF BOTH KNEES: Primary | ICD-10-CM

## 2022-03-25 DIAGNOSIS — M76.60 ACHILLES TENDINITIS, UNSPECIFIED LATERALITY: ICD-10-CM

## 2022-03-25 DIAGNOSIS — M54.16 RADICULOPATHY, LUMBAR REGION: ICD-10-CM

## 2022-03-25 DIAGNOSIS — M47.816 SPONDYLOSIS WITHOUT MYELOPATHY OR RADICULOPATHY, LUMBAR REGION: ICD-10-CM

## 2022-03-25 DIAGNOSIS — M47.816 LUMBAR SPONDYLOSIS: ICD-10-CM

## 2022-03-25 DIAGNOSIS — G57.53 TARSAL TUNNEL SYNDROME OF BOTH LOWER EXTREMITIES: ICD-10-CM

## 2022-03-25 DIAGNOSIS — M25.562 CHRONIC PAIN OF BOTH KNEES: Primary | ICD-10-CM

## 2022-03-25 DIAGNOSIS — L40.50 PSA (PSORIATIC ARTHRITIS): ICD-10-CM

## 2022-03-25 DIAGNOSIS — F51.01 PRIMARY INSOMNIA: ICD-10-CM

## 2022-03-25 DIAGNOSIS — M54.16 LUMBAR RADICULOPATHY: ICD-10-CM

## 2022-03-25 PROCEDURE — 3077F SYST BP >= 140 MM HG: CPT | Mod: CPTII,S$GLB,, | Performed by: INTERNAL MEDICINE

## 2022-03-25 PROCEDURE — 3077F PR MOST RECENT SYSTOLIC BLOOD PRESSURE >= 140 MM HG: ICD-10-PCS | Mod: CPTII,S$GLB,, | Performed by: INTERNAL MEDICINE

## 2022-03-25 PROCEDURE — 1159F PR MEDICATION LIST DOCUMENTED IN MEDICAL RECORD: ICD-10-PCS | Mod: CPTII,S$GLB,, | Performed by: INTERNAL MEDICINE

## 2022-03-25 PROCEDURE — 99215 OFFICE O/P EST HI 40 MIN: CPT | Mod: S$GLB,,, | Performed by: INTERNAL MEDICINE

## 2022-03-25 PROCEDURE — 3008F PR BODY MASS INDEX (BMI) DOCUMENTED: ICD-10-PCS | Mod: CPTII,S$GLB,, | Performed by: INTERNAL MEDICINE

## 2022-03-25 PROCEDURE — 3079F PR MOST RECENT DIASTOLIC BLOOD PRESSURE 80-89 MM HG: ICD-10-PCS | Mod: CPTII,S$GLB,, | Performed by: INTERNAL MEDICINE

## 2022-03-25 PROCEDURE — 3079F DIAST BP 80-89 MM HG: CPT | Mod: CPTII,S$GLB,, | Performed by: INTERNAL MEDICINE

## 2022-03-25 PROCEDURE — 99999 PR PBB SHADOW E&M-EST. PATIENT-LVL V: CPT | Mod: PBBFAC,,, | Performed by: INTERNAL MEDICINE

## 2022-03-25 PROCEDURE — 99215 PR OFFICE/OUTPT VISIT, EST, LEVL V, 40-54 MIN: ICD-10-PCS | Mod: S$GLB,,, | Performed by: INTERNAL MEDICINE

## 2022-03-25 PROCEDURE — 1159F MED LIST DOCD IN RCRD: CPT | Mod: CPTII,S$GLB,, | Performed by: INTERNAL MEDICINE

## 2022-03-25 PROCEDURE — 99999 PR PBB SHADOW E&M-EST. PATIENT-LVL V: ICD-10-PCS | Mod: PBBFAC,,, | Performed by: INTERNAL MEDICINE

## 2022-03-25 PROCEDURE — 3008F BODY MASS INDEX DOCD: CPT | Mod: CPTII,S$GLB,, | Performed by: INTERNAL MEDICINE

## 2022-03-25 RX ORDER — CELECOXIB 200 MG/1
200 CAPSULE ORAL DAILY
Qty: 30 CAPSULE | Refills: 6 | Status: SHIPPED | OUTPATIENT
Start: 2022-03-25 | End: 2022-09-19 | Stop reason: SDUPTHER

## 2022-03-25 RX ORDER — SULFASALAZINE 500 MG/1
1000 TABLET, DELAYED RELEASE ORAL 2 TIMES DAILY
Qty: 120 TABLET | Refills: 11 | Status: SHIPPED | OUTPATIENT
Start: 2022-03-25 | End: 2022-07-19

## 2022-03-25 RX ORDER — CLOBETASOL PROPIONATE 0.5 MG/G
OINTMENT TOPICAL 2 TIMES DAILY
Qty: 45 G | Refills: 4 | Status: SHIPPED | OUTPATIENT
Start: 2022-03-25

## 2022-03-25 RX ORDER — KETOROLAC TROMETHAMINE 30 MG/ML
60 INJECTION, SOLUTION INTRAMUSCULAR; INTRAVENOUS
Status: DISCONTINUED | OUTPATIENT
Start: 2022-03-25 | End: 2022-04-03

## 2022-03-25 RX ORDER — HYDROCODONE BITARTRATE AND ACETAMINOPHEN 10; 325 MG/1; MG/1
TABLET ORAL
Qty: 120 TABLET | Refills: 0 | Status: SHIPPED | OUTPATIENT
Start: 2022-03-25 | End: 2022-09-19 | Stop reason: SDUPTHER

## 2022-03-25 NOTE — PROGRESS NOTES
Subjective:       Patient ID: Tera Marquez is a 66 y.o. male.    Chief Complaint: Disease Management    Follow up: 67 yo male with lumbar sacral arthritis,he had 2  Level fusion and cervical spine  Surgery  9/11/20 he still has hip pain R hip replaced done 11.2020, he started with R knee pain and swelling   He started with neuropathy. B feet toes to  MTP B L is worse then the R. Patient complains of arthralgias and myalgias for which has been present for a few years. He had achilles rupture in 1980 and now has numbness both feet in toes. He has  New onset psoriasis with nail changes elbows and  hands            He complains of joint swelling. Associated symptoms include fatigue.         Review of Systems   Constitutional: Positive for activity change and fatigue. Negative for appetite change, chills, diaphoresis and unexpected weight change.   HENT: Negative for congestion, ear pain, facial swelling, mouth sores, nosebleeds, postnasal drip, rhinorrhea, sinus pressure, sneezing, sore throat, tinnitus and voice change.    Eyes: Negative for pain, discharge, redness, itching and visual disturbance.   Respiratory: Negative for apnea, cough, chest tightness, shortness of breath and wheezing.    Cardiovascular: Negative for chest pain, palpitations and leg swelling.   Gastrointestinal: Negative for abdominal pain, constipation, diarrhea, nausea and vomiting.   Endocrine: Negative for cold intolerance, heat intolerance, polydipsia and polyuria.   Genitourinary: Negative for decreased urine volume, difficulty urinating, flank pain, frequency, hematuria and urgency.   Musculoskeletal: Positive for arthralgias, back pain, gait problem, joint swelling, neck pain and neck stiffness.   Skin: Positive for rash. Negative for pallor and wound.   Allergic/Immunologic: Negative for immunocompromised state.   Neurological: Negative for dizziness, tremors, seizures, syncope, weakness and numbness.   Hematological: Negative for  "adenopathy. Does not bruise/bleed easily.   Psychiatric/Behavioral: Negative for sleep disturbance and suicidal ideas. The patient is not nervous/anxious.          Objective:   BP (!) 155/85   Pulse 80   Ht 6' 3" (1.905 m)   Wt 104.5 kg (230 lb 6.1 oz)   BMI 28.80 kg/m²      Physical Exam   Constitutional: He is oriented to person, place, and time. No distress.   HENT:   Head: Normocephalic and atraumatic.   Mouth/Throat: Oropharynx is clear and moist.   Eyes: Pupils are equal, round, and reactive to light.   Neck: No thyromegaly present.   Cardiovascular: Normal rate, regular rhythm and normal heart sounds. Exam reveals no gallop and no friction rub.   No murmur heard.  Pulmonary/Chest: Breath sounds normal. He has no wheezes. He has no rales. He exhibits no tenderness.   Abdominal: There is no abdominal tenderness. There is no rebound and no guarding.   Musculoskeletal:         General: Tenderness and deformity present.      Right shoulder: Tenderness present.      Left shoulder: Tenderness present.      Right elbow: Tenderness present.      Left elbow: Normal.      Left wrist: Normal.      Cervical back: Neck supple.      Right knee: Swelling and effusion present.      Left knee: Swelling and effusion present.   Lymphadenopathy:     He has no cervical adenopathy.   Neurological: He is alert and oriented to person, place, and time. He displays weakness. He exhibits abnormal muscle tone.   Reflex Scores:       Patellar reflexes are 2+ on the right side and 2+ on the left side.  Skin: Lesion noted. No rash noted. No erythema. No pallor.   Psoriatic nails and elbows   Psychiatric: Mood, affect and judgment normal.   Vitals reviewed.      Right Side Rheumatological Exam     Examination finds the 2nd MCP, 3rd MCP, 4th MCP and 5th MCP normal.    The patient is tender to palpation of the shoulder and elbow    He has swelling of the knee    The patient has an enlarged wrist, 1st PIP, 2nd PIP, 3rd PIP, 4th PIP and 5th " PIP    Shoulder Exam   Tenderness Location: acromioclavicular joint and posterior shoulder    Range of Motion   Active abduction: abnormal   Adduction: abnormal  Sensation: normal    Knee Exam     Range of Motion   Extension: normal   Flexion: normal   Patellofemoral Crepitus: positive  Effusion: positive  Sensation: normal    Hip Exam   Tenderness Location: anterior and posterior    Range of Motion   Extension: abnormal   Flexion: abnormal   Sensation: normal    Elbow/Wrist Exam   Tenderness Location: no tenderness    Range of Motion   Elbow   Flexion: normal   Sensation: normal    Muscle Strength (0-5 scale):  Neck Flexion:  3  Neck Extension: 3  Deltoid:  3  Biceps: 3/5   Triceps:  3  Quadriceps:  3   Distal Lower Extremity: 3    Left Side Rheumatological Exam     Examination finds the elbow, wrist, 1st MCP, 2nd MCP, 3rd MCP, 4th MCP and 5th MCP normal.    The patient is tender to palpation of the shoulder.    He has swelling of the knee    The patient has an enlarged 1st PIP, 2nd PIP, 3rd PIP, 4th PIP and 5th PIP.    Shoulder Exam   Tenderness Location: acromioclavicular joint and posterior shoulder    Range of Motion   Active abduction: abnormal   Extension: abnormal   Sensation: normal    Knee Exam     Range of Motion   Extension: normal   Flexion: normal     Patellofemoral Crepitus: positive  Effusion: positive  Sensation: normal    Hip Exam   Tenderness Location: anterior and posterior    Range of Motion   Extension: abnormal   Flexion: abnormal   Sensation: normal    Elbow/Wrist Exam     Range of Motion   Elbow   Flexion: normal   Sensation: normal    Muscle Strength (0-5 scale):  Neck Flexion:  3  Neck Extension: 3  Deltoid:  3  Biceps: 3/5   Triceps:  3  Quadriceps:  3   Distal Lower Extremity: 3      Back/Neck Exam   General Inspection   Gait: normal       Tenderness Right paramedian tenderness of the Lower C-Spine, Lower L-Spine, Upper C-Spine, Upper L-Spine and SI Joint.Left paramedian tenderness of  the Lower C-Spine, Lower L-Spine, Upper C-Spine, Upper L-Spine and SI Joint.    Back Range of Motion   Extension: abnormal  Flexion: abnormal  Lateral Bend Right: abnormal  Lateral Bend Left: abnormal  Rotation Right: abnormal  Rotation Left: abnormal    Neck Range of Motion   Flexion: Limited and moderate  Extension: Limited and moderate  Right Lateral Bend: abnormal  Left Lateral Bend: abnormal  Right Rotation: abnormal  Left Rotation: abnormal          Results for orders placed or performed in visit on 02/10/21   TSH   Result Value Ref Range    TSH 1.66 0.40 - 4.50 mIU/L   Vitamin D   Result Value Ref Range    Vitamin D, 25-OH, Total 90 30 - 100 ng/mL   C-Reactive Protein   Result Value Ref Range    CRP 3.0 <8.0 mg/L   Comprehensive Metabolic Panel   Result Value Ref Range    Glucose 95 65 - 99 mg/dL    BUN 18 7 - 25 mg/dL    Creatinine 1.20 0.70 - 1.25 mg/dL    eGFR if non  64 > OR = 60 mL/min/1.73m2    eGFR if African American 74 > OR = 60 mL/min/1.73m2    BUN/Creatinine Ratio NOT APPLICABLE 6 - 22 (calc)    Sodium 140 135 - 146 mmol/L    Potassium 4.2 3.5 - 5.3 mmol/L    Chloride 102 98 - 110 mmol/L    CO2 28 20 - 32 mmol/L    Calcium 9.8 8.6 - 10.3 mg/dL    Total Protein 7.2 6.1 - 8.1 g/dL    Albumin 4.4 3.6 - 5.1 g/dL    Globulin, Total 2.8 1.9 - 3.7 g/dL (calc)    Albumin/Globulin Ratio 1.6 1.0 - 2.5 (calc)    Total Bilirubin 0.5 0.2 - 1.2 mg/dL    Alkaline Phosphatase 84 35 - 144 U/L    AST 30 10 - 35 U/L    ALT 29 9 - 46 U/L   CBC Auto Differential   Result Value Ref Range    WBC 4.3 3.8 - 10.8 Thousand/uL    RBC 4.99 4.20 - 5.80 Million/uL    Hemoglobin 16.0 13.2 - 17.1 g/dL    Hematocrit 47.2 38.5 - 50.0 %    MCV 94.6 80.0 - 100.0 fL    MCH 32.1 27.0 - 33.0 pg    MCHC 33.9 32.0 - 36.0 g/dL    RDW 12.7 11.0 - 15.0 %    Platelets 219 140 - 400 Thousand/uL    MPV 8.8 7.5 - 12.5 fL    Neutrophils, Abs 1,853 1,500 - 7,800 cells/uL    Lymph # 1,905 850 - 3,900 cells/uL    Mono # 421 200 - 950  cells/uL    Eos # 99 15 - 500 cells/uL    Baso # 22 0 - 200 cells/uL    Neutrophils Relative 43.1 %    Lymph % 44.3 %    Mono % 9.8 %    Eosinophil % 2.3 %    Basophil % 0.5 %   Sedimentation Rate   Result Value Ref Range    Sed Rate 2 < OR = 20 mm/h   reviewed labs with patient during this visit       Assessment:       1. Chronic pain of both knees    2. PSA (psoriatic arthritis)    3. Lumbar radiculopathy    4. DDD (degenerative disc disease), lumbar    5. Chronic pain syndrome    6. Achilles tendinitis, unspecified laterality    7. Tarsal tunnel syndrome of both lower extremities    8. Lumbar spondylosis    9. Primary insomnia    10. Spondylosis without myelopathy or radiculopathy, lumbar region    11. Radiculopathy, lumbar region            Plan:       Tera was seen today for disease management.    Diagnoses and all orders for this visit:    Chronic pain of both knees  -     X-ray Knee Ortho Bilateral with Flexion; Future  -     sulfaSALAzine (AZULFIDINE EN-TABS) 500 MG EC tablet; Take 2 tablets (1,000 mg total) by mouth 2 (two) times daily.  -     clobetasol 0.05% (TEMOVATE) 0.05 % Oint; Apply topically 2 (two) times daily.  -     ketorolac injection 60 mg  -     CBC Auto Differential; Future  -     Comprehensive Metabolic Panel; Future  -     C-Reactive Protein; Future  -     Sedimentation rate; Future    PSA (psoriatic arthritis)  -     sulfaSALAzine (AZULFIDINE EN-TABS) 500 MG EC tablet; Take 2 tablets (1,000 mg total) by mouth 2 (two) times daily.  -     clobetasol 0.05% (TEMOVATE) 0.05 % Oint; Apply topically 2 (two) times daily.  -     ketorolac injection 60 mg  -     CBC Auto Differential; Future  -     Comprehensive Metabolic Panel; Future  -     C-Reactive Protein; Future  -     Sedimentation rate; Future    Lumbar radiculopathy  -     HYDROcodone-acetaminophen (NORCO)  mg per tablet; Take 1 tablet by mouth every 6 (six) hours as needed for Pain.  -     ketorolac injection 60 mg  -     CBC  Auto Differential; Future  -     Comprehensive Metabolic Panel; Future  -     C-Reactive Protein; Future  -     Sedimentation rate; Future    DDD (degenerative disc disease), lumbar  -     HYDROcodone-acetaminophen (NORCO)  mg per tablet; Take 1 tablet by mouth every 6 (six) hours as needed for Pain.  -     ketorolac injection 60 mg  -     CBC Auto Differential; Future  -     Comprehensive Metabolic Panel; Future  -     C-Reactive Protein; Future  -     Sedimentation rate; Future    Chronic pain syndrome  -     HYDROcodone-acetaminophen (NORCO)  mg per tablet; Take 1 tablet by mouth every 6 (six) hours as needed for Pain.  -     ketorolac injection 60 mg  -     CBC Auto Differential; Future  -     Comprehensive Metabolic Panel; Future  -     C-Reactive Protein; Future  -     Sedimentation rate; Future    Achilles tendinitis, unspecified laterality  -     HYDROcodone-acetaminophen (NORCO)  mg per tablet; Take 1 tablet by mouth every 6 (six) hours as needed for Pain.  -     ketorolac injection 60 mg  -     CBC Auto Differential; Future  -     Comprehensive Metabolic Panel; Future  -     C-Reactive Protein; Future  -     Sedimentation rate; Future    Tarsal tunnel syndrome of both lower extremities  -     HYDROcodone-acetaminophen (NORCO)  mg per tablet; Take 1 tablet by mouth every 6 (six) hours as needed for Pain.  -     ketorolac injection 60 mg    Lumbar spondylosis  -     HYDROcodone-acetaminophen (NORCO)  mg per tablet; Take 1 tablet by mouth every 6 (six) hours as needed for Pain.  -     ketorolac injection 60 mg  -     CBC Auto Differential; Future  -     Comprehensive Metabolic Panel; Future  -     C-Reactive Protein; Future  -     Sedimentation rate; Future    Primary insomnia  -     HYDROcodone-acetaminophen (NORCO)  mg per tablet; Take 1 tablet by mouth every 6 (six) hours as needed for Pain.  -     CBC Auto Differential; Future  -     Comprehensive Metabolic Panel;  Future  -     C-Reactive Protein; Future  -     Sedimentation rate; Future    Spondylosis without myelopathy or radiculopathy, lumbar region  -     celecoxib (CELEBREX) 200 MG capsule; Take 1 capsule (200 mg total) by mouth once daily.    Radiculopathy, lumbar region  -     celecoxib (CELEBREX) 200 MG capsule; Take 1 capsule (200 mg total) by mouth once daily.      1. Refills pain meds  2. Try on and add [employer]  3. Refill lunesta add lyrica and add b complex- to treat neuropathy we will see neurology in 1 to 1 1/2 m,[facility] to give the medicine a chance  4. toradol  60 and b12.    l shoulder and toradol.

## 2022-03-25 NOTE — PATIENT INSTRUCTIONS
1 azulfidine 1 tab twice a day x 1 week then 2 tab twice a day with food  You may be able to stop celebrex if feeling great   Norco given   Psoriatic ointment sent to pharmacy  Xray of knees ordered okay to do in [place]  Check EVI wang

## 2022-08-01 ENCOUNTER — TELEPHONE (OUTPATIENT)
Dept: RHEUMATOLOGY | Facility: CLINIC | Age: 67
End: 2022-08-01
Payer: COMMERCIAL

## 2022-08-01 NOTE — TELEPHONE ENCOUNTER
Called patient and informed him Dr. Sanford was booked out until January 2023 and she did not have and appointments available and offered him a 6 month follow up with Dia Gonzáles PA-C on 9/19 at  815 AM patient accepted appointment scheduled.  ----- Message from Naye Hatfield sent at 7/29/2022  2:10 PM CDT -----  .Type:  Patient Call Back    Who Called: PT      Does the patient know what this is regarding?: 6 MON F/U     Would the patient rather a call back YES     Best Call Back Number: [phone]    Additional Information: Thank You          no concerns

## 2022-08-30 DIAGNOSIS — M54.16 LUMBAR RADICULOPATHY: ICD-10-CM

## 2022-08-30 DIAGNOSIS — M76.60 ACHILLES TENDINITIS, UNSPECIFIED LATERALITY: ICD-10-CM

## 2022-08-30 DIAGNOSIS — F51.01 PRIMARY INSOMNIA: ICD-10-CM

## 2022-08-30 DIAGNOSIS — M47.816 LUMBAR SPONDYLOSIS: ICD-10-CM

## 2022-08-30 DIAGNOSIS — G89.4 CHRONIC PAIN SYNDROME: ICD-10-CM

## 2022-08-30 DIAGNOSIS — M51.36 DDD (DEGENERATIVE DISC DISEASE), LUMBAR: ICD-10-CM

## 2022-08-30 DIAGNOSIS — G57.53 TARSAL TUNNEL SYNDROME OF BOTH LOWER EXTREMITIES: ICD-10-CM

## 2022-09-04 RX ORDER — ESZOPICLONE 3 MG/1
TABLET, FILM COATED ORAL
Qty: 30 TABLET | Refills: 4 | Status: SHIPPED | OUTPATIENT
Start: 2022-09-04 | End: 2023-04-02

## 2022-09-04 RX ORDER — PREGABALIN 50 MG/1
50 CAPSULE ORAL 3 TIMES DAILY
Qty: 90 CAPSULE | Refills: 5 | Status: SHIPPED | OUTPATIENT
Start: 2022-09-04 | End: 2023-05-29

## 2022-09-19 ENCOUNTER — OFFICE VISIT (OUTPATIENT)
Dept: RHEUMATOLOGY | Facility: CLINIC | Age: 67
End: 2022-09-19
Payer: COMMERCIAL

## 2022-09-19 VITALS
HEART RATE: 93 BPM | DIASTOLIC BLOOD PRESSURE: 86 MMHG | HEIGHT: 75 IN | SYSTOLIC BLOOD PRESSURE: 166 MMHG | WEIGHT: 222 LBS | BODY MASS INDEX: 27.6 KG/M2

## 2022-09-19 DIAGNOSIS — G89.4 CHRONIC PAIN SYNDROME: ICD-10-CM

## 2022-09-19 DIAGNOSIS — L40.50 PSA (PSORIATIC ARTHRITIS): Primary | ICD-10-CM

## 2022-09-19 DIAGNOSIS — M47.816 LUMBAR SPONDYLOSIS: ICD-10-CM

## 2022-09-19 DIAGNOSIS — F51.01 PRIMARY INSOMNIA: ICD-10-CM

## 2022-09-19 DIAGNOSIS — M54.16 LUMBAR RADICULOPATHY: ICD-10-CM

## 2022-09-19 DIAGNOSIS — M76.60 ACHILLES TENDINITIS, UNSPECIFIED LATERALITY: ICD-10-CM

## 2022-09-19 DIAGNOSIS — G57.53 TARSAL TUNNEL SYNDROME OF BOTH LOWER EXTREMITIES: ICD-10-CM

## 2022-09-19 DIAGNOSIS — M51.369 DDD (DEGENERATIVE DISC DISEASE), LUMBAR: ICD-10-CM

## 2022-09-19 DIAGNOSIS — F51.04 CHRONIC INSOMNIA: ICD-10-CM

## 2022-09-19 PROCEDURE — 3077F SYST BP >= 140 MM HG: CPT | Mod: CPTII,S$GLB,, | Performed by: PHYSICIAN ASSISTANT

## 2022-09-19 PROCEDURE — 99214 OFFICE O/P EST MOD 30 MIN: CPT | Mod: 25,S$GLB,, | Performed by: PHYSICIAN ASSISTANT

## 2022-09-19 PROCEDURE — 1159F MED LIST DOCD IN RCRD: CPT | Mod: CPTII,S$GLB,, | Performed by: PHYSICIAN ASSISTANT

## 2022-09-19 PROCEDURE — 99999 PR PBB SHADOW E&M-EST. PATIENT-LVL IV: CPT | Mod: PBBFAC,,, | Performed by: PHYSICIAN ASSISTANT

## 2022-09-19 PROCEDURE — 3079F PR MOST RECENT DIASTOLIC BLOOD PRESSURE 80-89 MM HG: ICD-10-PCS | Mod: CPTII,S$GLB,, | Performed by: PHYSICIAN ASSISTANT

## 2022-09-19 PROCEDURE — 96372 THER/PROPH/DIAG INJ SC/IM: CPT | Mod: S$GLB,,, | Performed by: PHYSICIAN ASSISTANT

## 2022-09-19 PROCEDURE — 99214 PR OFFICE/OUTPT VISIT, EST, LEVL IV, 30-39 MIN: ICD-10-PCS | Mod: 25,S$GLB,, | Performed by: PHYSICIAN ASSISTANT

## 2022-09-19 PROCEDURE — 99999 PR PBB SHADOW E&M-EST. PATIENT-LVL IV: ICD-10-PCS | Mod: PBBFAC,,, | Performed by: PHYSICIAN ASSISTANT

## 2022-09-19 PROCEDURE — 1159F PR MEDICATION LIST DOCUMENTED IN MEDICAL RECORD: ICD-10-PCS | Mod: CPTII,S$GLB,, | Performed by: PHYSICIAN ASSISTANT

## 2022-09-19 PROCEDURE — 3008F BODY MASS INDEX DOCD: CPT | Mod: CPTII,S$GLB,, | Performed by: PHYSICIAN ASSISTANT

## 2022-09-19 PROCEDURE — 3008F PR BODY MASS INDEX (BMI) DOCUMENTED: ICD-10-PCS | Mod: CPTII,S$GLB,, | Performed by: PHYSICIAN ASSISTANT

## 2022-09-19 PROCEDURE — 1160F PR REVIEW ALL MEDS BY PRESCRIBER/CLIN PHARMACIST DOCUMENTED: ICD-10-PCS | Mod: CPTII,S$GLB,, | Performed by: PHYSICIAN ASSISTANT

## 2022-09-19 PROCEDURE — 96372 PR INJECTION,THERAP/PROPH/DIAG2ST, IM OR SUBCUT: ICD-10-PCS | Mod: S$GLB,,, | Performed by: PHYSICIAN ASSISTANT

## 2022-09-19 PROCEDURE — 3079F DIAST BP 80-89 MM HG: CPT | Mod: CPTII,S$GLB,, | Performed by: PHYSICIAN ASSISTANT

## 2022-09-19 PROCEDURE — 3077F PR MOST RECENT SYSTOLIC BLOOD PRESSURE >= 140 MM HG: ICD-10-PCS | Mod: CPTII,S$GLB,, | Performed by: PHYSICIAN ASSISTANT

## 2022-09-19 PROCEDURE — 1160F RVW MEDS BY RX/DR IN RCRD: CPT | Mod: CPTII,S$GLB,, | Performed by: PHYSICIAN ASSISTANT

## 2022-09-19 RX ORDER — METHYLPREDNISOLONE ACETATE 80 MG/ML
80 INJECTION, SUSPENSION INTRA-ARTICULAR; INTRALESIONAL; INTRAMUSCULAR; SOFT TISSUE
Status: COMPLETED | OUTPATIENT
Start: 2022-09-19 | End: 2022-09-19

## 2022-09-19 RX ORDER — CELECOXIB 200 MG/1
200 CAPSULE ORAL DAILY
Qty: 30 CAPSULE | Refills: 6 | Status: SHIPPED | OUTPATIENT
Start: 2022-09-19 | End: 2023-09-18 | Stop reason: SDUPTHER

## 2022-09-19 RX ORDER — DORZOLAMIDE HCL 20 MG/ML
1 SOLUTION/ DROPS OPHTHALMIC 2 TIMES DAILY
COMMUNITY
Start: 2022-08-01

## 2022-09-19 RX ORDER — KETOROLAC TROMETHAMINE 30 MG/ML
60 INJECTION, SOLUTION INTRAMUSCULAR; INTRAVENOUS
Status: COMPLETED | OUTPATIENT
Start: 2022-09-19 | End: 2022-09-19

## 2022-09-19 RX ADMIN — KETOROLAC TROMETHAMINE 60 MG: 30 INJECTION, SOLUTION INTRAMUSCULAR; INTRAVENOUS at 08:09

## 2022-09-19 RX ADMIN — METHYLPREDNISOLONE ACETATE 80 MG: 80 INJECTION, SUSPENSION INTRA-ARTICULAR; INTRALESIONAL; INTRAMUSCULAR; SOFT TISSUE at 08:09

## 2022-09-19 NOTE — PROGRESS NOTES
2 Patient ID's verified and allergies reviewed      Administered Ketorolac 60mg/mL, 2cc's in Left Upper Gluteal    Administered Depo Medrol 80mg/mL, 2cc's in Right Upper Gluteal    Patient tolerated injections well and left the facility in stable condition     2

## 2022-09-19 NOTE — PROGRESS NOTES
Subjective:       Patient ID: Tera Marquez is a 66 y.o. male.    Chief Complaint: Disease Management    Mr. Marquez is a 66 year old male who presents to clinic for follow up on psoriatic arthritis and osteoarthritis. He is a new patient to me. He was unable to tolerate sulfasalazine due to GI upset.  He did notice some improvement in the stiffness and pain in his PIP joints.  He continues to have pain in bilateral 3rd PIP joint.  He plans to follow-up with orthopedics for x-rays which have already been ordered.  He has chronic pain in his neck and lumbar spine with history of surgery.  He has chronic neuropathy in his feet and is taking Lyrica 3 times a day.  He is taking Lunesta as needed for insomnia without side effects.  He is taking Norco on rare occasion for severe pain.  He reports labs recently completed for his primary care were within normal limits although I do not have these available to review today.    He is not interested in alternative treatment for psoriatic arthritis at this time.    Current treatment:  1. Celebrex  2. Norco    Prior treatment:   1. ssz    Review of Systems   Constitutional:  Positive for activity change. Negative for chills, fatigue and fever.   Eyes:  Negative for visual disturbance.   Respiratory:  Negative for cough, shortness of breath and wheezing.    Cardiovascular:  Negative for chest pain, palpitations and leg swelling.   Gastrointestinal:  Negative for abdominal pain, constipation, diarrhea, nausea and vomiting.   Musculoskeletal:  Positive for arthralgias, back pain, joint swelling, neck pain and neck stiffness. Negative for myalgias.   Skin:  Negative for rash.   Neurological:  Negative for dizziness, syncope and headaches.   Hematological:  Negative for adenopathy.   Psychiatric/Behavioral:  Positive for sleep disturbance.        Objective:     Vitals:    09/19/22 0819   BP: (!) 166/86   Pulse: 93       Past Medical History:   Diagnosis Date    Arthritis     Back  pain      Past Surgical History:   Procedure Laterality Date    FOOT SURGERY      JOINT REPLACEMENT  4/18/2012    left hip    SINUS SURGERY            Physical Exam   Eyes: Right conjunctiva is not injected. Left conjunctiva is not injected.   Neck: No JVD present. No thyromegaly present.   Cardiovascular: Normal rate and regular rhythm. Exam reveals no decreased pulses.   Pulmonary/Chest: Effort normal.   Musculoskeletal:      Right shoulder: Tenderness present.      Left shoulder: Tenderness present.      Right elbow: Normal.      Left elbow: Normal.      Right wrist: Normal.      Left wrist: Normal.      Right knee: Normal.      Left knee: Normal.   Lymphadenopathy:     He has no cervical adenopathy.   Neurological: Gait normal.   Skin: No purpura noted. Rash is not nodular and not urticarial.   Psychiatric: Mood and affect normal.       Right Side Rheumatological Exam     Examination finds the elbow, wrist, knee, 1st MCP, 2nd MCP, 3rd MCP, 4th MCP and 5th MCP normal.    The patient is tender to palpation of the shoulder, 1st PIP, 2nd PIP, 3rd PIP, 4th PIP and 5th PIP    He has swelling of the 3rd PIP    The patient has an enlarged 1st PIP, 2nd PIP, 3rd PIP, 4th PIP and 5th PIP    Left Side Rheumatological Exam     Examination finds the elbow, wrist, knee, 1st MCP, 2nd MCP, 3rd MCP, 4th MCP and 5th MCP normal.    The patient is tender to palpation of the shoulder, 1st PIP, 2nd PIP, 3rd PIP, 4th PIP and 5th PIP.    He has swelling of the 3rd PIP    The patient has an enlarged 1st PIP, 2nd PIP, 3rd PIP, 4th PIP and 5th PIP.        No labs  Assessment:       1. PSA (psoriatic arthritis)    2. Lumbar radiculopathy    3. Chronic insomnia    4. Chronic pain syndrome            Plan:       PSA (psoriatic arthritis)  -     ketorolac injection 60 mg  -     methylPREDNISolone acetate injection 80 mg  -     CBC Auto Differential; Future; Expected date: 09/19/2022  -     Comprehensive Metabolic Panel; Future; Expected date:  09/19/2022  -     C-Reactive Protein; Future; Expected date: 09/19/2022  -     Sedimentation Rate; Future; Expected date: 09/19/2022  -     celecoxib (CELEBREX) 200 MG capsule; Take 1 capsule (200 mg total) by mouth once daily.  Dispense: 30 capsule; Refill: 6    Lumbar radiculopathy  -     ketorolac injection 60 mg  -     methylPREDNISolone acetate injection 80 mg  -     celecoxib (CELEBREX) 200 MG capsule; Take 1 capsule (200 mg total) by mouth once daily.  Dispense: 30 capsule; Refill: 6    Chronic insomnia    Chronic pain syndrome        Assessment:  66 year old male with  Psoriatic arthritis, osteoarthritis  --chronic pain syndrome  --chronic insomnia on lunesta  --s/p bilateral hip replacement  --history of cervical and lumbar spine surgery    Plan:  Toradol 60, depo 80 given today in clinic  Return for shoulder injection with Dr. Sanford  Lab orders printed  May consider otezla in the future  Cont lunesta 3 mg qhs PRN  Cont celebrex 200 mg daily  Cont norco, lyrica PRN per MD.  I have checked [place] prescription monitoring program site and no unusual or abnormal behavior has occurred pt understand the risk and benefits of taking opioid medications and has decided to continue the medication.    Follow up:  5-6 mo Dr. Sanford

## 2022-09-21 RX ORDER — HYDROCODONE BITARTRATE AND ACETAMINOPHEN 10; 325 MG/1; MG/1
TABLET ORAL
Qty: 120 TABLET | Refills: 0 | Status: SHIPPED | OUTPATIENT
Start: 2022-09-21 | End: 2023-10-27 | Stop reason: SDUPTHER

## 2022-09-21 RX ORDER — HYDROCODONE BITARTRATE AND ACETAMINOPHEN 10; 325 MG/1; MG/1
TABLET ORAL
Qty: 120 TABLET | Refills: 0 | Status: SHIPPED | OUTPATIENT
Start: 2022-10-19 | End: 2023-10-27 | Stop reason: SDUPTHER

## 2023-01-17 ENCOUNTER — TELEPHONE (OUTPATIENT)
Dept: RHEUMATOLOGY | Facility: CLINIC | Age: 68
End: 2023-01-17
Payer: COMMERCIAL

## 2023-01-17 NOTE — TELEPHONE ENCOUNTER
Labs sent to [facility]  ----- Message from Kavitha Escalante MA sent at 1/17/2023  8:30 AM CST -----  Contact: pt at [phone]  Type: Needs Medical Advice  Who Called:  pt  Best Call Back Number: [phone]  Additional Information: pt is calling the office to have his current labs sent to [facility]. Please call back to advise.

## 2023-02-08 ENCOUNTER — TELEPHONE (OUTPATIENT)
Dept: RHEUMATOLOGY | Facility: CLINIC | Age: 68
End: 2023-02-08
Payer: COMMERCIAL

## 2023-02-08 NOTE — TELEPHONE ENCOUNTER
Called patient and let him know the provider does not have any appointments available patient states he will keep his appointment understanding verbalized  ----- Message from Delmi Syed sent at 2/7/2023 11:30 AM CST -----  Regarding: appointment  Contact: wife, Marianne Marquez  Type:  Sooner Appointment Request    Caller is requesting a sooner appointment.  Caller declined first available appointment listed below.  Caller will not accept being placed on the waitlist and is requesting a message be sent to doctor.    Name of Caller:  wife, Marianne Marquez  When is the first available appointment?  03/23/23  Symptoms:  6 month check  Best Call Back Number:  [phone] (home)   Additional Information:  Wife is asking if the appointment can be changed to 03/06/23 or 03/24/23 if 6th is not available. Patient prefers mornings. Please call patient's wife to advise.Thanks!

## 2023-03-30 DIAGNOSIS — M76.60 ACHILLES TENDINITIS, UNSPECIFIED LATERALITY: ICD-10-CM

## 2023-03-30 DIAGNOSIS — M47.816 LUMBAR SPONDYLOSIS: ICD-10-CM

## 2023-03-30 DIAGNOSIS — G89.4 CHRONIC PAIN SYNDROME: ICD-10-CM

## 2023-03-30 DIAGNOSIS — M54.16 LUMBAR RADICULOPATHY: ICD-10-CM

## 2023-03-30 DIAGNOSIS — M51.36 DDD (DEGENERATIVE DISC DISEASE), LUMBAR: ICD-10-CM

## 2023-03-30 DIAGNOSIS — F51.01 PRIMARY INSOMNIA: ICD-10-CM

## 2023-03-30 DIAGNOSIS — G57.53 TARSAL TUNNEL SYNDROME OF BOTH LOWER EXTREMITIES: ICD-10-CM

## 2023-04-02 RX ORDER — ESZOPICLONE 3 MG/1
TABLET, FILM COATED ORAL
Qty: 30 TABLET | Refills: 4 | Status: SHIPPED | OUTPATIENT
Start: 2023-04-02 | End: 2023-10-27 | Stop reason: SDUPTHER

## 2023-05-29 DIAGNOSIS — M47.816 LUMBAR SPONDYLOSIS: ICD-10-CM

## 2023-05-29 DIAGNOSIS — M76.60 ACHILLES TENDINITIS, UNSPECIFIED LATERALITY: ICD-10-CM

## 2023-05-29 DIAGNOSIS — F51.01 PRIMARY INSOMNIA: ICD-10-CM

## 2023-05-29 DIAGNOSIS — M51.36 DDD (DEGENERATIVE DISC DISEASE), LUMBAR: ICD-10-CM

## 2023-05-29 DIAGNOSIS — M54.16 LUMBAR RADICULOPATHY: ICD-10-CM

## 2023-05-29 DIAGNOSIS — G57.53 TARSAL TUNNEL SYNDROME OF BOTH LOWER EXTREMITIES: ICD-10-CM

## 2023-05-29 DIAGNOSIS — G89.4 CHRONIC PAIN SYNDROME: ICD-10-CM

## 2023-05-29 RX ORDER — PREGABALIN 50 MG/1
50 CAPSULE ORAL 3 TIMES DAILY
Qty: 90 CAPSULE | Refills: 5 | Status: SHIPPED | OUTPATIENT
Start: 2023-05-29 | End: 2024-02-26

## 2023-07-06 DIAGNOSIS — M54.16 LUMBAR RADICULOPATHY: ICD-10-CM

## 2023-07-06 DIAGNOSIS — L40.50 PSA (PSORIATIC ARTHRITIS): ICD-10-CM

## 2023-07-06 RX ORDER — CELECOXIB 200 MG/1
200 CAPSULE ORAL DAILY
Qty: 30 CAPSULE | Refills: 6 | Status: CANCELLED | OUTPATIENT
Start: 2023-07-06

## 2023-07-10 NOTE — TELEPHONE ENCOUNTER
Please call pt to schedule labs--so I can make sure it is safe to continue celebrex. No labs since 2021

## 2023-07-28 DIAGNOSIS — G57.53 TARSAL TUNNEL SYNDROME OF BOTH LOWER EXTREMITIES: ICD-10-CM

## 2023-07-28 DIAGNOSIS — M76.60 ACHILLES TENDINITIS, UNSPECIFIED LATERALITY: ICD-10-CM

## 2023-07-28 DIAGNOSIS — M47.816 LUMBAR SPONDYLOSIS: ICD-10-CM

## 2023-07-28 DIAGNOSIS — G89.4 CHRONIC PAIN SYNDROME: ICD-10-CM

## 2023-07-28 DIAGNOSIS — F51.01 PRIMARY INSOMNIA: ICD-10-CM

## 2023-07-28 DIAGNOSIS — M51.36 DDD (DEGENERATIVE DISC DISEASE), LUMBAR: ICD-10-CM

## 2023-07-28 DIAGNOSIS — M54.16 LUMBAR RADICULOPATHY: ICD-10-CM

## 2023-07-28 RX ORDER — HYDROCODONE BITARTRATE AND ACETAMINOPHEN 10; 325 MG/1; MG/1
TABLET ORAL
Qty: 120 TABLET | Refills: 0 | OUTPATIENT
Start: 2023-07-28

## 2023-09-16 DIAGNOSIS — M54.16 LUMBAR RADICULOPATHY: ICD-10-CM

## 2023-09-16 DIAGNOSIS — G57.53 TARSAL TUNNEL SYNDROME OF BOTH LOWER EXTREMITIES: ICD-10-CM

## 2023-09-16 DIAGNOSIS — M76.60 ACHILLES TENDINITIS, UNSPECIFIED LATERALITY: ICD-10-CM

## 2023-09-16 DIAGNOSIS — M47.816 LUMBAR SPONDYLOSIS: ICD-10-CM

## 2023-09-16 DIAGNOSIS — M51.36 DDD (DEGENERATIVE DISC DISEASE), LUMBAR: ICD-10-CM

## 2023-09-16 DIAGNOSIS — G89.4 CHRONIC PAIN SYNDROME: ICD-10-CM

## 2023-09-16 DIAGNOSIS — F51.01 PRIMARY INSOMNIA: ICD-10-CM

## 2023-09-18 DIAGNOSIS — L40.50 PSA (PSORIATIC ARTHRITIS): ICD-10-CM

## 2023-09-18 DIAGNOSIS — M54.16 LUMBAR RADICULOPATHY: ICD-10-CM

## 2023-09-18 RX ORDER — HYDROCODONE BITARTRATE AND ACETAMINOPHEN 10; 325 MG/1; MG/1
TABLET ORAL
Qty: 120 TABLET | Refills: 0 | OUTPATIENT
Start: 2023-09-18

## 2023-09-18 RX ORDER — CELECOXIB 200 MG/1
200 CAPSULE ORAL DAILY
Qty: 30 CAPSULE | Refills: 3 | Status: SHIPPED | OUTPATIENT
Start: 2023-09-18 | End: 2024-02-26 | Stop reason: SDUPTHER

## 2023-09-18 NOTE — TELEPHONE ENCOUNTER
Spoke to patient regarding a sooner appointment and blood work needing to be done before up coming appointment. Nurse offered this afternoon and Monday of next week. Patient not able to do either appointments will keep October appointment.

## 2023-10-10 LAB
ALBUMIN SERPL-MCNC: 4.3 G/DL (ref 3.6–5.1)
ALBUMIN/GLOB SERPL: 1.9 (CALC) (ref 1–2.5)
ALP SERPL-CCNC: 100 U/L (ref 35–144)
ALT SERPL-CCNC: 74 U/L (ref 9–46)
AST SERPL-CCNC: 58 U/L (ref 10–35)
BASOPHILS # BLD AUTO: 9 CELLS/UL (ref 0–200)
BASOPHILS NFR BLD AUTO: 0.2 %
BILIRUB SERPL-MCNC: 0.4 MG/DL (ref 0.2–1.2)
BUN SERPL-MCNC: 18 MG/DL (ref 7–25)
BUN/CREAT SERPL: ABNORMAL (CALC) (ref 6–22)
CALCIUM SERPL-MCNC: 9.6 MG/DL (ref 8.6–10.3)
CHLORIDE SERPL-SCNC: 103 MMOL/L (ref 98–110)
CO2 SERPL-SCNC: 28 MMOL/L (ref 20–32)
CREAT SERPL-MCNC: 1.08 MG/DL (ref 0.7–1.35)
CRP SERPL-MCNC: 1.5 MG/L
EGFR: 76 ML/MIN/1.73M2
EOSINOPHIL # BLD AUTO: 30 CELLS/UL (ref 15–500)
EOSINOPHIL NFR BLD AUTO: 0.7 %
ERYTHROCYTE [DISTWIDTH] IN BLOOD BY AUTOMATED COUNT: 12.6 % (ref 11–15)
ERYTHROCYTE [SEDIMENTATION RATE] IN BLOOD BY WESTERGREN METHOD: 2 MM/H
GLOBULIN SER CALC-MCNC: 2.3 G/DL (CALC) (ref 1.9–3.7)
GLUCOSE SERPL-MCNC: 102 MG/DL (ref 65–99)
HCT VFR BLD AUTO: 43.7 % (ref 38.5–50)
HGB BLD-MCNC: 15 G/DL (ref 13.2–17.1)
LYMPHOCYTES # BLD AUTO: 2159 CELLS/UL (ref 850–3900)
LYMPHOCYTES NFR BLD AUTO: 50.2 %
MCH RBC QN AUTO: 31.9 PG (ref 27–33)
MCHC RBC AUTO-ENTMCNC: 34.3 G/DL (ref 32–36)
MCV RBC AUTO: 93 FL (ref 80–100)
MONOCYTES # BLD AUTO: 378 CELLS/UL (ref 200–950)
MONOCYTES NFR BLD AUTO: 8.8 %
NEUTROPHILS # BLD AUTO: 1724 CELLS/UL (ref 1500–7800)
NEUTROPHILS NFR BLD AUTO: 40.1 %
PLATELET # BLD AUTO: 252 THOUSAND/UL (ref 140–400)
PMV BLD REES-ECKER: 9.3 FL (ref 7.5–12.5)
POTASSIUM SERPL-SCNC: 4.4 MMOL/L (ref 3.5–5.3)
PROT SERPL-MCNC: 6.6 G/DL (ref 6.1–8.1)
RBC # BLD AUTO: 4.7 MILLION/UL (ref 4.2–5.8)
SODIUM SERPL-SCNC: 141 MMOL/L (ref 135–146)
WBC # BLD AUTO: 4.3 THOUSAND/UL (ref 3.8–10.8)

## 2023-10-27 ENCOUNTER — OFFICE VISIT (OUTPATIENT)
Dept: RHEUMATOLOGY | Facility: CLINIC | Age: 68
End: 2023-10-27
Payer: MEDICARE

## 2023-10-27 VITALS
DIASTOLIC BLOOD PRESSURE: 83 MMHG | HEART RATE: 90 BPM | SYSTOLIC BLOOD PRESSURE: 165 MMHG | BODY MASS INDEX: 28.72 KG/M2 | WEIGHT: 231 LBS | HEIGHT: 75 IN

## 2023-10-27 DIAGNOSIS — L40.9 PSORIASIS: ICD-10-CM

## 2023-10-27 DIAGNOSIS — M25.562 CHRONIC PAIN OF BOTH KNEES: ICD-10-CM

## 2023-10-27 DIAGNOSIS — M54.16 LUMBAR RADICULOPATHY: ICD-10-CM

## 2023-10-27 DIAGNOSIS — M47.816 LUMBAR SPONDYLOSIS: ICD-10-CM

## 2023-10-27 DIAGNOSIS — F51.04 CHRONIC INSOMNIA: ICD-10-CM

## 2023-10-27 DIAGNOSIS — G57.53 TARSAL TUNNEL SYNDROME OF BOTH LOWER EXTREMITIES: ICD-10-CM

## 2023-10-27 DIAGNOSIS — G89.29 CHRONIC PAIN OF BOTH KNEES: ICD-10-CM

## 2023-10-27 DIAGNOSIS — M76.60 ACHILLES TENDINITIS, UNSPECIFIED LATERALITY: ICD-10-CM

## 2023-10-27 DIAGNOSIS — G89.4 CHRONIC PAIN SYNDROME: ICD-10-CM

## 2023-10-27 DIAGNOSIS — M51.36 DDD (DEGENERATIVE DISC DISEASE), LUMBAR: ICD-10-CM

## 2023-10-27 DIAGNOSIS — F51.01 PRIMARY INSOMNIA: ICD-10-CM

## 2023-10-27 DIAGNOSIS — R79.89 LFT ELEVATION: ICD-10-CM

## 2023-10-27 DIAGNOSIS — L40.50 PSA (PSORIATIC ARTHRITIS): Primary | ICD-10-CM

## 2023-10-27 DIAGNOSIS — M25.561 CHRONIC PAIN OF BOTH KNEES: ICD-10-CM

## 2023-10-27 PROCEDURE — 1159F PR MEDICATION LIST DOCUMENTED IN MEDICAL RECORD: ICD-10-PCS | Mod: CPTII,S$GLB,, | Performed by: INTERNAL MEDICINE

## 2023-10-27 PROCEDURE — 3077F PR MOST RECENT SYSTOLIC BLOOD PRESSURE >= 140 MM HG: ICD-10-PCS | Mod: CPTII,S$GLB,, | Performed by: INTERNAL MEDICINE

## 2023-10-27 PROCEDURE — 1160F RVW MEDS BY RX/DR IN RCRD: CPT | Mod: CPTII,S$GLB,, | Performed by: INTERNAL MEDICINE

## 2023-10-27 PROCEDURE — 99999 PR PBB SHADOW E&M-EST. PATIENT-LVL III: CPT | Mod: PBBFAC,,, | Performed by: INTERNAL MEDICINE

## 2023-10-27 PROCEDURE — 1159F MED LIST DOCD IN RCRD: CPT | Mod: CPTII,S$GLB,, | Performed by: INTERNAL MEDICINE

## 2023-10-27 PROCEDURE — 3077F SYST BP >= 140 MM HG: CPT | Mod: CPTII,S$GLB,, | Performed by: INTERNAL MEDICINE

## 2023-10-27 PROCEDURE — 3079F PR MOST RECENT DIASTOLIC BLOOD PRESSURE 80-89 MM HG: ICD-10-PCS | Mod: CPTII,S$GLB,, | Performed by: INTERNAL MEDICINE

## 2023-10-27 PROCEDURE — 96372 PR INJECTION,THERAP/PROPH/DIAG2ST, IM OR SUBCUT: ICD-10-PCS | Mod: S$GLB,,, | Performed by: INTERNAL MEDICINE

## 2023-10-27 PROCEDURE — 99215 PR OFFICE/OUTPT VISIT, EST, LEVL V, 40-54 MIN: ICD-10-PCS | Mod: 25,S$GLB,, | Performed by: INTERNAL MEDICINE

## 2023-10-27 PROCEDURE — 99999 PR PBB SHADOW E&M-EST. PATIENT-LVL III: ICD-10-PCS | Mod: PBBFAC,,, | Performed by: INTERNAL MEDICINE

## 2023-10-27 PROCEDURE — 1125F PR PAIN SEVERITY QUANTIFIED, PAIN PRESENT: ICD-10-PCS | Mod: CPTII,S$GLB,, | Performed by: INTERNAL MEDICINE

## 2023-10-27 PROCEDURE — 3008F PR BODY MASS INDEX (BMI) DOCUMENTED: ICD-10-PCS | Mod: CPTII,S$GLB,, | Performed by: INTERNAL MEDICINE

## 2023-10-27 PROCEDURE — 99215 OFFICE O/P EST HI 40 MIN: CPT | Mod: 25,S$GLB,, | Performed by: INTERNAL MEDICINE

## 2023-10-27 PROCEDURE — 1160F PR REVIEW ALL MEDS BY PRESCRIBER/CLIN PHARMACIST DOCUMENTED: ICD-10-PCS | Mod: CPTII,S$GLB,, | Performed by: INTERNAL MEDICINE

## 2023-10-27 PROCEDURE — 3008F BODY MASS INDEX DOCD: CPT | Mod: CPTII,S$GLB,, | Performed by: INTERNAL MEDICINE

## 2023-10-27 PROCEDURE — 3079F DIAST BP 80-89 MM HG: CPT | Mod: CPTII,S$GLB,, | Performed by: INTERNAL MEDICINE

## 2023-10-27 PROCEDURE — 96372 THER/PROPH/DIAG INJ SC/IM: CPT | Mod: S$GLB,,, | Performed by: INTERNAL MEDICINE

## 2023-10-27 PROCEDURE — 1125F AMNT PAIN NOTED PAIN PRSNT: CPT | Mod: CPTII,S$GLB,, | Performed by: INTERNAL MEDICINE

## 2023-10-27 RX ORDER — TRIAMCINOLONE ACETONIDE 1 MG/G
OINTMENT TOPICAL 2 TIMES DAILY
Qty: 80 G | Refills: 3 | Status: SHIPPED | OUTPATIENT
Start: 2023-10-27

## 2023-10-27 RX ORDER — CYANOCOBALAMIN 1000 UG/ML
1000 INJECTION, SOLUTION INTRAMUSCULAR; SUBCUTANEOUS
Status: COMPLETED | OUTPATIENT
Start: 2023-10-27 | End: 2023-10-27

## 2023-10-27 RX ORDER — ESZOPICLONE 3 MG/1
TABLET, FILM COATED ORAL
Qty: 30 TABLET | Refills: 4 | Status: SHIPPED | OUTPATIENT
Start: 2023-10-27 | End: 2024-02-26 | Stop reason: SDUPTHER

## 2023-10-27 RX ORDER — HYDROCODONE BITARTRATE AND ACETAMINOPHEN 10; 325 MG/1; MG/1
TABLET ORAL
Qty: 120 TABLET | Refills: 0 | Status: SHIPPED | OUTPATIENT
Start: 2023-11-20 | End: 2024-02-26

## 2023-10-27 RX ORDER — HYDROCODONE BITARTRATE AND ACETAMINOPHEN 10; 325 MG/1; MG/1
TABLET ORAL
Qty: 120 TABLET | Refills: 0 | Status: SHIPPED | OUTPATIENT
Start: 2023-10-27 | End: 2024-02-26 | Stop reason: SDUPTHER

## 2023-10-27 RX ORDER — METHYLPREDNISOLONE ACETATE 80 MG/ML
80 INJECTION, SUSPENSION INTRA-ARTICULAR; INTRALESIONAL; INTRAMUSCULAR; SOFT TISSUE
Status: COMPLETED | OUTPATIENT
Start: 2023-10-27 | End: 2023-10-27

## 2023-10-27 RX ORDER — HYDROCODONE BITARTRATE AND ACETAMINOPHEN 10; 325 MG/1; MG/1
1 TABLET ORAL EVERY 6 HOURS PRN
Qty: 120 TABLET | Refills: 0 | Status: SHIPPED | OUTPATIENT
Start: 2023-12-20 | End: 2024-01-19

## 2023-10-27 RX ORDER — KETOROLAC TROMETHAMINE 30 MG/ML
60 INJECTION, SOLUTION INTRAMUSCULAR; INTRAVENOUS
Status: COMPLETED | OUTPATIENT
Start: 2023-10-27 | End: 2023-10-27

## 2023-10-27 RX ADMIN — KETOROLAC TROMETHAMINE 60 MG: 30 INJECTION, SOLUTION INTRAMUSCULAR; INTRAVENOUS at 02:10

## 2023-10-27 RX ADMIN — METHYLPREDNISOLONE ACETATE 80 MG: 80 INJECTION, SUSPENSION INTRA-ARTICULAR; INTRALESIONAL; INTRAMUSCULAR; SOFT TISSUE at 02:10

## 2023-10-27 RX ADMIN — CYANOCOBALAMIN 1000 MCG: 1000 INJECTION, SOLUTION INTRAMUSCULAR; SUBCUTANEOUS at 02:10

## 2023-10-27 NOTE — PROGRESS NOTES
Subjective:       Patient ID: Tera Marquez is a 67 y.o. male.    Chief Complaint: Disease Management      Follow up: 67 year old male with a h/o peripheral  neuropathy who presents to clinic for follow up on psoriatic arthritis and osteoarthritis.  He did notice some improvement in the stiffness and pain in his PIP joints. He has b shoulder pain and injection ruptured R biceps. He continues to have pain in bilateral 3rd PIP joint.  He plans to follow-up with orthopedics for x-rays which have already been ordered.  He has chronic pain in his neck and lumbar spine with history of surgery.  He is scheduled to have C spine surgery Nov 16, 2023.He has chronic neuropathy in his feet and is taking Lyrica 3 times a day.  He is taking Lunesta as needed for insomnia without side effects.  He is taking Norco on rare occasion for severe pain.  He reports labs recently completed for his primary care were within normal limits although.we will hold tx  until post surgery.He is not interested in alternative treatment for psoriatic arthritis at this time.    Current treatment:  1. Celebrex  2. Norco  3. lyrica    Prior treatment:   1. ssz    Review of Systems   Constitutional:  Positive for activity change. Negative for chills.   Eyes:  Negative for visual disturbance.   Respiratory:  Negative for cough, shortness of breath and wheezing.    Cardiovascular:  Negative for chest pain, palpitations and leg swelling.   Gastrointestinal:  Negative for abdominal pain, constipation, diarrhea, nausea and vomiting.   Musculoskeletal:  Positive for arthralgias, back pain, joint swelling, neck pain and neck stiffness.   Skin:  Negative for rash.   Neurological:  Negative for dizziness and syncope.   Hematological:  Negative for adenopathy.   Psychiatric/Behavioral:  Positive for sleep disturbance.          Objective:     Vitals:    10/27/23 1139   BP: (!) 165/83   Pulse: 90       Past Medical History:   Diagnosis Date    Arthritis     Back  pain      Past Surgical History:   Procedure Laterality Date    FOOT SURGERY      JOINT REPLACEMENT  4/18/2012    left hip    SINUS SURGERY            Physical Exam   Eyes: Right conjunctiva is not injected. Left conjunctiva is not injected.   Neck: No JVD present. No thyromegaly present.   Cardiovascular: Normal rate and regular rhythm. Exam reveals no decreased pulses.   Pulmonary/Chest: Effort normal.   Musculoskeletal:      Right shoulder: Tenderness present.      Left shoulder: Tenderness present.      Right elbow: Normal.      Left elbow: Normal.      Right wrist: Normal.      Left wrist: Normal.      Right knee: Normal.      Left knee: Normal.   Lymphadenopathy:     He has no cervical adenopathy.   Neurological: Gait normal.   Skin: No purpura noted. Rash is not nodular and not urticarial.   Psychiatric: Mood and affect normal.       Right Side Rheumatological Exam     Examination finds the elbow, wrist, knee, 1st MCP, 2nd MCP, 3rd MCP, 4th MCP and 5th MCP normal.    The patient is tender to palpation of the shoulder, 1st PIP, 2nd PIP, 3rd PIP, 4th PIP and 5th PIP    He has swelling of the 3rd PIP    The patient has an enlarged 1st PIP, 2nd PIP, 3rd PIP, 4th PIP and 5th PIP    Left Side Rheumatological Exam     Examination finds the elbow, wrist, knee, 1st MCP, 2nd MCP, 3rd MCP, 4th MCP and 5th MCP normal.    The patient is tender to palpation of the shoulder, 1st PIP, 2nd PIP, 3rd PIP, 4th PIP and 5th PIP.    He has swelling of the 3rd PIP    The patient has an enlarged 1st PIP, 2nd PIP, 3rd PIP, 4th PIP and 5th PIP.          Results for orders placed or performed in visit on 09/18/23   C-Reactive Protein   Result Value Ref Range    CRP 1.5 <8.0 mg/L   Comprehensive Metabolic Panel   Result Value Ref Range    Glucose 102 (H) 65 - 99 mg/dL    BUN 18 7 - 25 mg/dL    Creatinine 1.08 0.70 - 1.35 mg/dL    eGFR 76 > OR = 60 mL/min/1.73m2    BUN/Creatinine Ratio SEE NOTE: 6 - 22 (calc)    Sodium 141 135 - 146  mmol/L    Potassium 4.4 3.5 - 5.3 mmol/L    Chloride 103 98 - 110 mmol/L    CO2 28 20 - 32 mmol/L    Calcium 9.6 8.6 - 10.3 mg/dL    Total Protein 6.6 6.1 - 8.1 g/dL    Albumin 4.3 3.6 - 5.1 g/dL    Globulin, Total 2.3 1.9 - 3.7 g/dL (calc)    Albumin/Globulin Ratio 1.9 1.0 - 2.5 (calc)    Total Bilirubin 0.4 0.2 - 1.2 mg/dL    Alkaline Phosphatase 100 35 - 144 U/L    AST 58 (H) 10 - 35 U/L    ALT 74 (H) 9 - 46 U/L   CBC Auto Differential   Result Value Ref Range    WBC 4.3 3.8 - 10.8 Thousand/uL    RBC 4.70 4.20 - 5.80 Million/uL    Hemoglobin 15.0 13.2 - 17.1 g/dL    Hematocrit 43.7 38.5 - 50.0 %    MCV 93.0 80.0 - 100.0 fL    MCH 31.9 27.0 - 33.0 pg    MCHC 34.3 32.0 - 36.0 g/dL    RDW 12.6 11.0 - 15.0 %    Platelets 252 140 - 400 Thousand/uL    MPV 9.3 7.5 - 12.5 fL    Neutrophils, Abs 1,724 1,500 - 7,800 cells/uL    Lymph # 2,159 850 - 3,900 cells/uL    Mono # 378 200 - 950 cells/uL    Eos # 30 15 - 500 cells/uL    Baso # 9 0 - 200 cells/uL    Neutrophils Relative 40.1 %    Lymph % 50.2 %    Mono % 8.8 %    Eosinophil % 0.7 %    Basophil % 0.2 %   Sedimentation Rate   Result Value Ref Range    Sed Rate 2 < OR = 20 mm/h   CT Lumbar Spine Without Contrast  Order: [id number]  Narrative    REASON FOR EXAM: [M54.16]-Radiculopathy, lumbar region / M54.16 / [COMMENTS]-W/ 3D RECONSTRUCTION     TECHNICAL FACTORS: Multiple contiguous axial CT images were obtained from T12 to the sacrum without administration of intravenous contrast. 2D reformatted imaged were obtained. Automated exposure control was utilized for radiation dose reduction.     COMPARISON: 7/19/2023 MRI lumbar spine.     FINDINGS: L5-S1 posterior fusion and discectomy with left hemilaminectomy. No acute osseous abnormalities.     At the L1-2 level, there is no evidence of central canal stenosis or neural foraminal narrowing.     At the L2-3 level, posterior disc bulging, ligamentum flavum hypertrophy, and posterior facet arthropathy. Mild central canal  stenosis. Moderate to severe right and severe left neural foraminal stenosis.     At the L3-4 level, posterior disc bulging, ligamentum flavum hypertrophy, and posterior facet arthropathy. Moderate to severe central canal stenosis. Severe bilateral neural foraminal stenosis.     At the L4-5 level, posterior disc bulging and posterior facet arthropathy. No significant central canal stenosis. Moderate to severe bilateral neural foraminal stenosis.     At the L5-S1 level, fusion and discectomy and left hemilaminectomy. No central canal stenosis. Moderate right and severe left neural foraminal stenosis.     IMPRESSION:    1.  The L5-S1 level is better visualized on this CT than the recent MRI. No central canal stenosis. Moderate right and severe left neural foraminal stenosis.    2.  Similar degree of discogenic degenerative changes spanning L1-2 through L4-5 as visualized on the 7/19/2023 MRI.         Electronically signed by Sandoval Caceres MD on 8/1/2023 9:20 AM  Exam End: 08/01/23 08:37    Specimen Collected: 08/01/23 09:05 Last Resulted: 08/01/23 09:20   Received From: [facility]  Result Received: 09/18/23 09:42   CT Cervical Spine Without Contrast  Order: [id number]  Narrative    REASON FOR EXAM: [M54.12]-Radiculopathy, cervical region / M54.12 / [COMMENTS]-W/ 3D RECONSTRUCTION     TECHNICAL FACTORS: Multiple contiguous axial CT images were obtained from the skull base to T1 vertebral body without administration of intravenous contrast. 2D reformatted imaged were obtained. Automated exposure control was utilized for radiation dose   reduction.     COMPARISON: CT soft tissue neck from 6/26/2017.     FINDINGS: There is no evidence of acute fracture.  Anterior cervical discectomy and fusion spanning C5-C7. Vertebral alignment is normal.  Soft tissue structures are normal in appearance.     At C2-3, mild central disc bulging and posterior facet arthropathy. No significant central canal or neural  foraminal stenosis.   At C3-4, mild central disc bulging and posterior facet arthropathy. Mild central canal stenosis. No significant neural foraminal stenosis.   At C4-5, Mild central disc protrusion. Mild central canal stenosis. No significant neural foraminal stenosis.   At C5-6, posterior osteophyte formation and posterior facet hypertrophy. Mild central canal stenosis. Moderate right neural foraminal stenosis with probable abutment of the exiting nerve root. No left neural foraminal stenosis.   At C6-7, no central canal or neural foraminal stenosis.     IMPRESSION:    1.  Posterior osteophyte at C5-6 probably abutting the exiting right nerve root.    2.  Mild central canal stenosis at C3-4 and C5-6.   3. Otherwise, no significant encroachment of the central canal or neural foramina.       MRI Lumbar Spine Without Contrast  Order: [id number]  Narrative    REASON FOR EXAM: [M54.16]-Radiculopathy, lumbar region / M54.16     TECHNICAL FACTORS: Sagittal T2, sagittal T1, sagittal fat-suppressed T2 and axial T2 sequences were obtained lumbar spine without administration of intravenous contrast.     COMPARISON: 10/6/2006     FINDINGS:     Vertebral body heights and alignment appear preserved.   No fracture or worrisome marrow abnormality.   Multilevel anterior plate osteophytosis. There is generalized intervertebral disc desiccation. L5-S1 posterior fusion and discectomy.   The conus   medullaris is identified at the L1  level, and is normal in appearance.  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 1.3 cm left adrenal gland nodule. This was present on CT of the abdomen and pelvis July 12, 2018) appears unchanged and may   therefore be considered benign.     L1-2:  There is no disc bulge or focal protrusion.  There is no central spinal stenosis or foraminal narrowing.     L2-3:  Diffuse posterior circumferential disc bulge with ligamentum flavum thickening and bilateral facet arthropathy. Mild canal stenosis.  Severe left and moderate to severe right foraminal narrowing.     L3-4:  Diffuse posterior circumferential disc bulge with ligamentum flavum thickening and bilateral facet arthropathy. Moderate to severe multifactorial spinal stenosis. Both foramina appear severely stenotic.     L4-5:  Mild diffuse posterior circumferential disc bulge and bilateral facet arthropathy. No canal stenosis. Moderate to severe bilateral foraminal narrowing.     L5-S1:  Fusion and discectomy and likely a left hemilaminectomy. No spinal stenosis. Suboptimal foraminal assessment secondary to susceptibility artifact from hardware, however, the left foramen is at least moderately stenotic and the right is mildly   stenotic.       IMPRESSION:   Lumbosacral fusion with degenerative changes above the surgical level. Findings appear most notable at L3-4 where there is moderate to severe multifactorial stenosis and severe bilateral foraminal narrowing.     Electronically signed by Tulio Morocho MD on 7/19/2023 8:10 AM  Exam End: 07/19/23 07:43    Specimen Collected: 07/19/23 07:50 Last Resulted: 07/19/23 08:10   Received From: [facility]  Result Received: 09/18/23 09:42       Assessment:         Electronically signed by Sandoval Caceres MD on 8/1/2023 10:05 AM  Exam End: 08/01/23 08:37    Specimen Collected: 08/01/23 09:32 Last Resulted: 08/01/23 10:05   Received From: [facility]  Result Received: 09/18/23 09:42             Plan:       PSA (psoriatic arthritis)  -     Magnesium; Future; Expected date: 10/27/2023  -     HEPATIC FUNCTION PANEL; Future; Expected date: 10/27/2023  -     HYDROcodone-acetaminophen (NORCO)  mg per tablet; Take 1 tablet by mouth every 6 (six) hours as needed for Pain.  Dispense: 120 tablet; Refill: 0    Lumbar radiculopathy  -     Magnesium; Future; Expected date: 10/27/2023  -     HEPATIC FUNCTION PANEL; Future; Expected date: 10/27/2023  -     HYDROcodone-acetaminophen (NORCO)  mg  per tablet; Take 1 tablet by mouth every 6 (six) hours as needed for Pain.  Dispense: 120 tablet; Refill: 0  -     HYDROcodone-acetaminophen (NORCO)  mg per tablet; Take 1 tablet by mouth every 6 (six) hours as needed for Pain.  Dispense: 120 tablet; Refill: 0  -     HYDROcodone-acetaminophen (NORCO)  mg per tablet; Take 1 tablet by mouth every 6 (six) hours as needed for Pain.  Dispense: 120 tablet; Refill: 0  -     triamcinolone acetonide 0.1% (KENALOG) 0.1 % ointment; Apply topically 2 (two) times daily.  Dispense: 80 g; Refill: 3    Chronic pain of both knees  -     Magnesium; Future; Expected date: 10/27/2023  -     HEPATIC FUNCTION PANEL; Future; Expected date: 10/27/2023    Chronic pain syndrome  -     Magnesium; Future; Expected date: 10/27/2023  -     HEPATIC FUNCTION PANEL; Future; Expected date: 10/27/2023  -     HYDROcodone-acetaminophen (NORCO)  mg per tablet; Take 1 tablet by mouth every 6 (six) hours as needed for Pain.  Dispense: 120 tablet; Refill: 0  -     HYDROcodone-acetaminophen (NORCO)  mg per tablet; Take 1 tablet by mouth every 6 (six) hours as needed for Pain.  Dispense: 120 tablet; Refill: 0  -     HYDROcodone-acetaminophen (NORCO)  mg per tablet; Take 1 tablet by mouth every 6 (six) hours as needed for Pain.  Dispense: 120 tablet; Refill: 0    Chronic insomnia  -     Magnesium; Future; Expected date: 10/27/2023  -     HEPATIC FUNCTION PANEL; Future; Expected date: 10/27/2023    LFT elevation  -     HEPATIC FUNCTION PANEL; Future; Expected date: 10/27/2023    Achilles tendinitis, unspecified laterality  -     HEPATIC FUNCTION PANEL; Future; Expected date: 10/27/2023  -     HYDROcodone-acetaminophen (NORCO)  mg per tablet; Take 1 tablet by mouth every 6 (six) hours as needed for Pain.  Dispense: 120 tablet; Refill: 0  -     HYDROcodone-acetaminophen (NORCO)  mg per tablet; Take 1 tablet by mouth every 6 (six) hours as needed for Pain.  Dispense: 120  tablet; Refill: 0    Tarsal tunnel syndrome of both lower extremities  -     HEPATIC FUNCTION PANEL; Future; Expected date: 10/27/2023  -     HYDROcodone-acetaminophen (NORCO)  mg per tablet; Take 1 tablet by mouth every 6 (six) hours as needed for Pain.  Dispense: 120 tablet; Refill: 0  -     HYDROcodone-acetaminophen (NORCO)  mg per tablet; Take 1 tablet by mouth every 6 (six) hours as needed for Pain.  Dispense: 120 tablet; Refill: 0    Lumbar spondylosis  -     HEPATIC FUNCTION PANEL; Future; Expected date: 10/27/2023  -     HYDROcodone-acetaminophen (NORCO)  mg per tablet; Take 1 tablet by mouth every 6 (six) hours as needed for Pain.  Dispense: 120 tablet; Refill: 0  -     HYDROcodone-acetaminophen (NORCO)  mg per tablet; Take 1 tablet by mouth every 6 (six) hours as needed for Pain.  Dispense: 120 tablet; Refill: 0    DDD (degenerative disc disease), lumbar  -     HEPATIC FUNCTION PANEL; Future; Expected date: 10/27/2023  -     HYDROcodone-acetaminophen (NORCO)  mg per tablet; Take 1 tablet by mouth every 6 (six) hours as needed for Pain.  Dispense: 120 tablet; Refill: 0  -     HYDROcodone-acetaminophen (NORCO)  mg per tablet; Take 1 tablet by mouth every 6 (six) hours as needed for Pain.  Dispense: 120 tablet; Refill: 0    Primary insomnia  -     HYDROcodone-acetaminophen (NORCO)  mg per tablet; Take 1 tablet by mouth every 6 (six) hours as needed for Pain.  Dispense: 120 tablet; Refill: 0  -     HYDROcodone-acetaminophen (NORCO)  mg per tablet; Take 1 tablet by mouth every 6 (six) hours as needed for Pain.  Dispense: 120 tablet; Refill: 0    Psoriasis  -     triamcinolone acetonide 0.1% (KENALOG) 0.1 % ointment; Apply topically 2 (two) times daily.  Dispense: 80 g; Refill: 3            Assessment:  66 year old male with  Psoriatic arthritis, osteoarthritis  --chronic pain syndrome  --chronic insomnia on lunesta  --s/p bilateral hip replacement  --history of  cervical and lumbar spine surgery    Plan:  Toradol 60, depo 80 given today in clinic  Lab orders printed  Cont lunesta 3 mg qhs PRN  Cont celebrex 200 mg daily- hold 7 days prior to surgery  Cont norco, lyrica PRN  I have checked [place] prescription monitoring program site and no unusual or abnormal behavior has occurred pt understand the risk and benefits of taking opioid medications and has decided to continue the medication.    He is cleared for surgery and  he will hold my pain meds when surgery prescribes    More than 50% of the  40 minute encounter was spent face to face counseling the patient regarding current status and future plan of care as well as side effects  of the medications. All questions were answered to patient's satisfaction also includes  non-face to face time preparing to see the patient (eg, review of tests), Obtaining and/or reviewing separately obtained history, Documenting clinical information in the electronic or other health record, Independently interpreting results

## 2023-11-21 LAB
ALBUMIN SERPL-MCNC: 4 G/DL (ref 3.6–5.1)
ALBUMIN/GLOB SERPL: 1.7 (CALC) (ref 1–2.5)
ALP SERPL-CCNC: 79 U/L (ref 35–144)
ALT SERPL-CCNC: 36 U/L (ref 9–46)
AST SERPL-CCNC: 51 U/L (ref 10–35)
BILIRUB DIRECT SERPL-MCNC: 0.1 MG/DL
BILIRUB INDIRECT SERPL-MCNC: 0.4 MG/DL (CALC) (ref 0.2–1.2)
BILIRUB SERPL-MCNC: 0.5 MG/DL (ref 0.2–1.2)
GLOBULIN SER CALC-MCNC: 2.3 G/DL (CALC) (ref 1.9–3.7)
MAGNESIUM SERPL-MCNC: 1.9 MG/DL (ref 1.5–2.5)
PROT SERPL-MCNC: 6.3 G/DL (ref 6.1–8.1)

## 2023-12-11 ENCOUNTER — PATIENT MESSAGE (OUTPATIENT)
Dept: RHEUMATOLOGY | Facility: CLINIC | Age: 68
End: 2023-12-11
Payer: MEDICARE

## 2023-12-12 ENCOUNTER — DOCUMENTATION ONLY (OUTPATIENT)
Dept: RHEUMATOLOGY | Facility: CLINIC | Age: 68
End: 2023-12-12
Payer: MEDICARE

## 2024-02-26 ENCOUNTER — OFFICE VISIT (OUTPATIENT)
Dept: RHEUMATOLOGY | Facility: CLINIC | Age: 69
End: 2024-02-26
Payer: MEDICARE

## 2024-02-26 VITALS
BODY MASS INDEX: 25.99 KG/M2 | DIASTOLIC BLOOD PRESSURE: 87 MMHG | HEIGHT: 75 IN | SYSTOLIC BLOOD PRESSURE: 163 MMHG | WEIGHT: 209 LBS | HEART RATE: 82 BPM

## 2024-02-26 DIAGNOSIS — G89.29 CHRONIC PAIN OF BOTH KNEES: ICD-10-CM

## 2024-02-26 DIAGNOSIS — M47.816 LUMBAR SPONDYLOSIS: ICD-10-CM

## 2024-02-26 DIAGNOSIS — G89.4 CHRONIC PAIN SYNDROME: ICD-10-CM

## 2024-02-26 DIAGNOSIS — M25.561 CHRONIC PAIN OF BOTH KNEES: ICD-10-CM

## 2024-02-26 DIAGNOSIS — M25.562 CHRONIC PAIN OF BOTH KNEES: ICD-10-CM

## 2024-02-26 DIAGNOSIS — M51.36 DDD (DEGENERATIVE DISC DISEASE), LUMBAR: ICD-10-CM

## 2024-02-26 DIAGNOSIS — G57.53 TARSAL TUNNEL SYNDROME OF BOTH LOWER EXTREMITIES: ICD-10-CM

## 2024-02-26 DIAGNOSIS — F51.01 PRIMARY INSOMNIA: ICD-10-CM

## 2024-02-26 DIAGNOSIS — M76.60 ACHILLES TENDINITIS, UNSPECIFIED LATERALITY: ICD-10-CM

## 2024-02-26 DIAGNOSIS — M54.16 LUMBAR RADICULOPATHY: ICD-10-CM

## 2024-02-26 DIAGNOSIS — L40.50 PSA (PSORIATIC ARTHRITIS): Primary | ICD-10-CM

## 2024-02-26 PROCEDURE — 1160F RVW MEDS BY RX/DR IN RCRD: CPT | Mod: CPTII,S$GLB,, | Performed by: PHYSICIAN ASSISTANT

## 2024-02-26 PROCEDURE — 3008F BODY MASS INDEX DOCD: CPT | Mod: CPTII,S$GLB,, | Performed by: PHYSICIAN ASSISTANT

## 2024-02-26 PROCEDURE — 3077F SYST BP >= 140 MM HG: CPT | Mod: CPTII,S$GLB,, | Performed by: PHYSICIAN ASSISTANT

## 2024-02-26 PROCEDURE — 3079F DIAST BP 80-89 MM HG: CPT | Mod: CPTII,S$GLB,, | Performed by: PHYSICIAN ASSISTANT

## 2024-02-26 PROCEDURE — 99214 OFFICE O/P EST MOD 30 MIN: CPT | Mod: 25,S$GLB,, | Performed by: PHYSICIAN ASSISTANT

## 2024-02-26 PROCEDURE — 99999 PR PBB SHADOW E&M-EST. PATIENT-LVL IV: CPT | Mod: PBBFAC,,, | Performed by: PHYSICIAN ASSISTANT

## 2024-02-26 PROCEDURE — 1125F AMNT PAIN NOTED PAIN PRSNT: CPT | Mod: CPTII,S$GLB,, | Performed by: PHYSICIAN ASSISTANT

## 2024-02-26 PROCEDURE — 1159F MED LIST DOCD IN RCRD: CPT | Mod: CPTII,S$GLB,, | Performed by: PHYSICIAN ASSISTANT

## 2024-02-26 PROCEDURE — 96372 THER/PROPH/DIAG INJ SC/IM: CPT | Mod: S$GLB,,, | Performed by: PHYSICIAN ASSISTANT

## 2024-02-26 RX ORDER — CELECOXIB 200 MG/1
200 CAPSULE ORAL DAILY
Qty: 30 CAPSULE | Refills: 6 | Status: SHIPPED | OUTPATIENT
Start: 2024-02-26

## 2024-02-26 RX ORDER — HYDROCODONE BITARTRATE AND ACETAMINOPHEN 10; 325 MG/1; MG/1
TABLET ORAL
Qty: 120 TABLET | Refills: 0 | Status: SHIPPED | OUTPATIENT
Start: 2024-03-01

## 2024-02-26 RX ORDER — HYDROCODONE BITARTRATE AND ACETAMINOPHEN 10; 325 MG/1; MG/1
TABLET ORAL
Qty: 120 TABLET | Refills: 0 | Status: SHIPPED | OUTPATIENT
Start: 2024-04-01

## 2024-02-26 RX ORDER — CYANOCOBALAMIN 1000 UG/ML
1000 INJECTION, SOLUTION INTRAMUSCULAR; SUBCUTANEOUS
Status: COMPLETED | OUTPATIENT
Start: 2024-02-26 | End: 2024-02-26

## 2024-02-26 RX ORDER — APREMILAST 30 MG/1
30 TABLET, FILM COATED ORAL 2 TIMES DAILY
Qty: 60 TABLET | Refills: 11 | Status: ACTIVE | OUTPATIENT
Start: 2024-02-26

## 2024-02-26 RX ORDER — KETOROLAC TROMETHAMINE 30 MG/ML
60 INJECTION, SOLUTION INTRAMUSCULAR; INTRAVENOUS
Status: COMPLETED | OUTPATIENT
Start: 2024-02-26 | End: 2024-02-26

## 2024-02-26 RX ORDER — METHYLPREDNISOLONE ACETATE 80 MG/ML
80 INJECTION, SUSPENSION INTRA-ARTICULAR; INTRALESIONAL; INTRAMUSCULAR; SOFT TISSUE
Status: COMPLETED | OUTPATIENT
Start: 2024-02-26 | End: 2024-02-26

## 2024-02-26 RX ORDER — ESZOPICLONE 3 MG/1
TABLET, FILM COATED ORAL
Qty: 30 TABLET | Refills: 4 | Status: SHIPPED | OUTPATIENT
Start: 2024-02-26

## 2024-02-26 RX ADMIN — METHYLPREDNISOLONE ACETATE 80 MG: 80 INJECTION, SUSPENSION INTRA-ARTICULAR; INTRALESIONAL; INTRAMUSCULAR; SOFT TISSUE at 10:02

## 2024-02-26 RX ADMIN — CYANOCOBALAMIN 1000 MCG: 1000 INJECTION, SOLUTION INTRAMUSCULAR; SUBCUTANEOUS at 10:02

## 2024-02-26 RX ADMIN — KETOROLAC TROMETHAMINE 60 MG: 30 INJECTION, SOLUTION INTRAMUSCULAR; INTRAVENOUS at 10:02

## 2024-02-26 ASSESSMENT — ROUTINE ASSESSMENT OF PATIENT INDEX DATA (RAPID3)
PATIENT GLOBAL ASSESSMENT SCORE: 7
MDHAQ FUNCTION SCORE: 0.1
PAIN SCORE: 6.5
TOTAL RAPID3 SCORE: 4.61
PSYCHOLOGICAL DISTRESS SCORE: 0
FATIGUE SCORE: 1.1

## 2024-02-26 NOTE — PROGRESS NOTES
Subjective:       Patient ID: Tera Marquez is a 68 y.o. male.    Chief Complaint: Disease Management      Mr. Marquez is a 68 year old male who presents to clinic for follow up on psoriatic arthritis and osteoarthritis. He is s/p cervical spine surgery 11/23 with Dr. Ramirez, which was successful. He may need additional lumbar spine surgery in the future.    He has pain in his hands, shoulders, hips, and knees. He had R shoulder injected by Ortho 2/24 with good relief. He stopped lyrica since it was not helping much with pain/neuropathy. He was off celebrex s/p neck surgery and noticed more increased pain all over at that time. He reports generalized stiffness throughout his body. He is very active with biking, walking, and doing push ups daily.     Psoriasis on his elbow is improving with clobetasol.    He is taking Lunesta as needed for insomnia without side effects.  He is taking Norco on rare occasion for severe pain.       Current treatment:  1. Celebrex  2. Norco    Prior treatment:   1. ssz      Review of Systems   Constitutional:  Positive for activity change. Negative for chills, fatigue, fever and unexpected weight change.   HENT:  Negative for mouth sores and trouble swallowing.    Eyes:  Negative for redness and visual disturbance.   Respiratory:  Negative for cough, shortness of breath and wheezing.    Cardiovascular:  Negative for chest pain, palpitations and leg swelling.   Gastrointestinal:  Negative for abdominal pain, constipation, diarrhea, nausea and vomiting.   Genitourinary:  Negative for genital sores.   Musculoskeletal:  Positive for arthralgias, back pain, joint swelling, neck pain and neck stiffness. Negative for myalgias.   Skin:  Positive for rash.   Neurological:  Negative for dizziness, syncope and headaches.   Hematological:  Negative for adenopathy. Does not bruise/bleed easily.   Psychiatric/Behavioral:  Positive for sleep disturbance.          Objective:     Vitals:    02/26/24 0915    BP: (!) 163/87   Pulse: 82       Past Medical History:   Diagnosis Date    Arthritis     Back pain      Past Surgical History:   Procedure Laterality Date    FOOT SURGERY      JOINT REPLACEMENT  4/18/2012    left hip    SINUS SURGERY            Physical Exam   Eyes: Right conjunctiva is not injected. Left conjunctiva is not injected.   Neck: No JVD present. No thyromegaly present.   Cardiovascular: Normal rate and regular rhythm. Exam reveals no decreased pulses.   Pulmonary/Chest: Effort normal.   Lymphadenopathy:     He has no cervical adenopathy.   Neurological: Gait normal.   Skin: Rash (hyperpigmentation on elbows) noted.   Psychiatric: Mood and affect normal.       Right Side Rheumatological Exam     Examination finds the 1st MCP, 2nd MCP, 3rd MCP, 4th MCP and 5th MCP normal.    The patient is tender to palpation of the 1st PIP, 2nd PIP, 3rd PIP, 4th PIP and 5th PIP    The patient has an enlarged 1st PIP, 2nd PIP, 3rd PIP, 4th PIP and 5th PIP    Left Side Rheumatological Exam     Examination finds the 1st MCP, 2nd MCP, 3rd MCP, 4th MCP and 5th MCP normal.    The patient is tender to palpation of the 1st PIP, 2nd PIP, 3rd PIP, 4th PIP and 5th PIP.    The patient has an enlarged 1st PIP, 2nd PIP, 3rd PIP, 4th PIP and 5th PIP.         Labs:  Component      Latest Ref Rng 10/9/2023   WBC      3.8 - 10.8 Thousand/uL 4.3    RBC      4.20 - 5.80 Million/uL 4.70    Hemoglobin      13.2 - 17.1 g/dL 15.0    Hematocrit      38.5 - 50.0 % 43.7    MCV      80.0 - 100.0 fL 93.0    MCH      27.0 - 33.0 pg 31.9    MCHC      32.0 - 36.0 g/dL 34.3    RDW      11.0 - 15.0 % 12.6    Platelet Count      140 - 400 Thousand/uL 252    MPV      7.5 - 12.5 fL 9.3    Neutrophils, Abs      1,500 - 7,800 cells/uL 1,724    Lymph #      850 - 3,900 cells/uL 2,159    Mono #      200 - 950 cells/uL 378    Eos #      15 - 500 cells/uL 30    Baso #      0 - 200 cells/uL 9    Neutrophils Relative      % 40.1    Lymph %      % 50.2    Mono %       % 8.8    Eos %      % 0.7    Basophil %      % 0.2    Glucose      65 - 99 mg/dL 102 (H)    BUN      7 - 25 mg/dL 18    Creatinine      0.70 - 1.35 mg/dL 1.08    eGFR      > OR = 60 mL/min/1.73m2 76    BUN/CREAT RATIO      6 - 22 (calc) SEE NOTE:    Sodium      135 - 146 mmol/L 141    Potassium      3.5 - 5.3 mmol/L 4.4    Chloride      98 - 110 mmol/L 103    CO2      20 - 32 mmol/L 28    Calcium      8.6 - 10.3 mg/dL 9.6    PROTEIN TOTAL      6.1 - 8.1 g/dL 6.6    Albumin      3.6 - 5.1 g/dL 4.3    Globulin, Total      1.9 - 3.7 g/dL (calc) 2.3    Albumin/Globulin Ratio      1.0 - 2.5 (calc) 1.9    BILIRUBIN TOTAL      0.2 - 1.2 mg/dL 0.4    ALP      35 - 144 U/L 100    AST      10 - 35 U/L 58 (H)    ALT      9 - 46 U/L 74 (H)    CRP      <8.0 mg/L 1.5    Sed Rate      < OR = 20 mm/h 2       Legend:  (H) High  Assessment:       1. PSA (psoriatic arthritis)    2. Chronic pain of both knees    3. Primary insomnia    4. Chronic pain syndrome              Plan:       PSA (psoriatic arthritis)  -     ketorolac injection 60 mg  -     methylPREDNISolone acetate injection 80 mg  -     cyanocobalamin injection 1,000 mcg  -     celecoxib (CELEBREX) 200 MG capsule; Take 1 capsule (200 mg total) by mouth once daily.  Dispense: 30 capsule; Refill: 6  -     CBC Auto Differential; Future; Expected date: 02/26/2024  -     Comprehensive Metabolic Panel; Future; Expected date: 02/26/2024  -     C-Reactive Protein; Future; Expected date: 02/26/2024  -     Sedimentation rate; Future; Expected date: 02/26/2024  -     apremilast (OTEZLA) 30 mg Tab; Take 1 tablet (30 mg total) by mouth 2 (two) times a day.  Dispense: 60 tablet; Refill: 11  -     apremilast (OTEZLA STARTER) 10 mg (4)-20 mg (4)-30 mg (47) DsPk; Day 1: 10 mg in AM, Day 2: 10 mg BID (AM and PM), Day 3: 10 mg in AM and 20 mg in PM, Day 4: 20 mg BID (AM and PM), Day 5: 20 mg in AM and 30 mg in PM, Day 6: start maintenance dosing of 30 mg BID (AM and PM).  Dispense: 55  tablet; Refill: 0    Chronic pain of both knees    Primary insomnia    Chronic pain syndrome            Assessment:  68 year old male with  Psoriatic arthritis, osteoarthritis  --chronic pain syndrome  --chronic insomnia on lunesta  --s/p bilateral hip replacement  --history of cervical and lumbar spine surgery    Plan:  Toradol 60, depo 80, b12 given today  Start otezla  Cont lunesta 3 mg qhs PRN  Cont celebrex 200 mg daily  Cont norco PRN per MD.  I have checked [place] prescription monitoring program site and no unusual or abnormal behavior has occurred pt understand the risk and benefits of taking opioid medications and has decided to continue the medication.    Follow up:  4 mo Dr. Sanford w/labs prior

## 2024-02-29 PROBLEM — L40.50 PSA (PSORIATIC ARTHRITIS): Status: ACTIVE | Noted: 2024-02-29

## 2024-03-11 ENCOUNTER — TELEPHONE (OUTPATIENT)
Dept: RHEUMATOLOGY | Facility: CLINIC | Age: 69
End: 2024-03-11
Payer: MEDICARE

## 2024-03-11 NOTE — TELEPHONE ENCOUNTER
----- Message from Petra Ryan LPN sent at 3/11/2024  2:16 PM CDT -----    ----- Message -----  From: Nader Barksdale  Sent: 3/11/2024  11:58 AM CDT  To: Claribel Mak Staff    Type: Needs Medical Advice  Who Called:  Ada pts wife  Best Call Back Number: [phone]  Additional Information: calling to request an appt for the pt, states he needs to be seen ASAP, pl call bk to advise thanks

## 2024-06-28 DIAGNOSIS — F51.01 PRIMARY INSOMNIA: ICD-10-CM

## 2024-06-28 DIAGNOSIS — G57.53 TARSAL TUNNEL SYNDROME OF BOTH LOWER EXTREMITIES: ICD-10-CM

## 2024-06-28 DIAGNOSIS — M54.16 LUMBAR RADICULOPATHY: ICD-10-CM

## 2024-06-28 DIAGNOSIS — M76.60 ACHILLES TENDINITIS, UNSPECIFIED LATERALITY: ICD-10-CM

## 2024-06-28 DIAGNOSIS — M47.816 LUMBAR SPONDYLOSIS: ICD-10-CM

## 2024-06-28 DIAGNOSIS — G89.4 CHRONIC PAIN SYNDROME: ICD-10-CM

## 2024-06-28 DIAGNOSIS — M51.36 DDD (DEGENERATIVE DISC DISEASE), LUMBAR: ICD-10-CM

## 2024-07-01 ENCOUNTER — OFFICE VISIT (OUTPATIENT)
Dept: RHEUMATOLOGY | Facility: CLINIC | Age: 69
End: 2024-07-01
Payer: MEDICARE

## 2024-07-01 VITALS
BODY MASS INDEX: 26.65 KG/M2 | SYSTOLIC BLOOD PRESSURE: 154 MMHG | HEART RATE: 97 BPM | DIASTOLIC BLOOD PRESSURE: 87 MMHG | WEIGHT: 214.31 LBS | HEIGHT: 75 IN

## 2024-07-01 DIAGNOSIS — G57.53 TARSAL TUNNEL SYNDROME OF BOTH LOWER EXTREMITIES: ICD-10-CM

## 2024-07-01 DIAGNOSIS — Z79.899 DRUG-INDUCED IMMUNODEFICIENCY: Primary | ICD-10-CM

## 2024-07-01 DIAGNOSIS — M47.816 LUMBAR SPONDYLOSIS: ICD-10-CM

## 2024-07-01 DIAGNOSIS — M54.16 LUMBAR RADICULOPATHY: ICD-10-CM

## 2024-07-01 DIAGNOSIS — G89.4 CHRONIC PAIN SYNDROME: ICD-10-CM

## 2024-07-01 DIAGNOSIS — M51.36 DDD (DEGENERATIVE DISC DISEASE), LUMBAR: ICD-10-CM

## 2024-07-01 DIAGNOSIS — D84.821 DRUG-INDUCED IMMUNODEFICIENCY: Primary | ICD-10-CM

## 2024-07-01 DIAGNOSIS — F51.01 PRIMARY INSOMNIA: ICD-10-CM

## 2024-07-01 DIAGNOSIS — M17.0 PRIMARY OSTEOARTHRITIS OF BOTH KNEES: ICD-10-CM

## 2024-07-01 DIAGNOSIS — L40.50 PSA (PSORIATIC ARTHRITIS): ICD-10-CM

## 2024-07-01 DIAGNOSIS — M76.60 ACHILLES TENDINITIS, UNSPECIFIED LATERALITY: ICD-10-CM

## 2024-07-01 PROCEDURE — 1125F AMNT PAIN NOTED PAIN PRSNT: CPT | Mod: CPTII,S$GLB,, | Performed by: INTERNAL MEDICINE

## 2024-07-01 PROCEDURE — 3077F SYST BP >= 140 MM HG: CPT | Mod: CPTII,S$GLB,, | Performed by: INTERNAL MEDICINE

## 2024-07-01 PROCEDURE — 99999 PR PBB SHADOW E&M-EST. PATIENT-LVL IV: CPT | Mod: PBBFAC,,, | Performed by: INTERNAL MEDICINE

## 2024-07-01 PROCEDURE — 96372 THER/PROPH/DIAG INJ SC/IM: CPT | Mod: S$GLB,,, | Performed by: INTERNAL MEDICINE

## 2024-07-01 PROCEDURE — 3079F DIAST BP 80-89 MM HG: CPT | Mod: CPTII,S$GLB,, | Performed by: INTERNAL MEDICINE

## 2024-07-01 PROCEDURE — 3288F FALL RISK ASSESSMENT DOCD: CPT | Mod: CPTII,S$GLB,, | Performed by: INTERNAL MEDICINE

## 2024-07-01 PROCEDURE — 1101F PT FALLS ASSESS-DOCD LE1/YR: CPT | Mod: CPTII,S$GLB,, | Performed by: INTERNAL MEDICINE

## 2024-07-01 PROCEDURE — 99215 OFFICE O/P EST HI 40 MIN: CPT | Mod: 25,S$GLB,, | Performed by: INTERNAL MEDICINE

## 2024-07-01 PROCEDURE — 1159F MED LIST DOCD IN RCRD: CPT | Mod: CPTII,S$GLB,, | Performed by: INTERNAL MEDICINE

## 2024-07-01 PROCEDURE — 3008F BODY MASS INDEX DOCD: CPT | Mod: CPTII,S$GLB,, | Performed by: INTERNAL MEDICINE

## 2024-07-01 RX ORDER — METHYLPREDNISOLONE ACETATE 80 MG/ML
160 INJECTION, SUSPENSION INTRA-ARTICULAR; INTRALESIONAL; INTRAMUSCULAR; SOFT TISSUE
Status: COMPLETED | OUTPATIENT
Start: 2024-07-01 | End: 2024-07-01

## 2024-07-01 RX ORDER — HYDROCORTISONE 25 MG/G
OINTMENT TOPICAL
COMMUNITY

## 2024-07-01 RX ORDER — HYDROCODONE BITARTRATE AND ACETAMINOPHEN 10; 325 MG/1; MG/1
TABLET ORAL
Qty: 120 TABLET | Refills: 0 | Status: SHIPPED | OUTPATIENT
Start: 2024-07-01

## 2024-07-01 RX ORDER — HYDROCODONE BITARTRATE AND ACETAMINOPHEN 10; 325 MG/1; MG/1
TABLET ORAL
Qty: 120 TABLET | Refills: 0 | OUTPATIENT
Start: 2024-07-01

## 2024-07-01 RX ORDER — PREGABALIN 75 MG/1
75 CAPSULE ORAL 2 TIMES DAILY
Qty: 60 CAPSULE | Refills: 6 | Status: SHIPPED | OUTPATIENT
Start: 2024-07-01 | End: 2024-12-30

## 2024-07-01 RX ORDER — HYDROCODONE BITARTRATE AND ACETAMINOPHEN 10; 325 MG/1; MG/1
TABLET ORAL
Qty: 120 TABLET | Refills: 0 | Status: SHIPPED | OUTPATIENT
Start: 2024-08-01

## 2024-07-01 RX ORDER — CALCIPOTRIENE 50 UG/G
CREAM TOPICAL 2 TIMES DAILY
Qty: 120 G | Refills: 3 | Status: SHIPPED | OUTPATIENT
Start: 2024-07-01 | End: 2025-07-01

## 2024-07-01 RX ORDER — KETOROLAC TROMETHAMINE 30 MG/ML
60 INJECTION, SOLUTION INTRAMUSCULAR; INTRAVENOUS
Status: COMPLETED | OUTPATIENT
Start: 2024-07-01 | End: 2024-07-01

## 2024-07-01 RX ORDER — CYANOCOBALAMIN 1000 UG/ML
1000 INJECTION, SOLUTION INTRAMUSCULAR; SUBCUTANEOUS
Status: COMPLETED | OUTPATIENT
Start: 2024-07-01 | End: 2024-07-01

## 2024-07-01 RX ORDER — HYDROCODONE BITARTRATE AND ACETAMINOPHEN 10; 325 MG/1; MG/1
TABLET ORAL
Qty: 120 TABLET | Refills: 0 | Status: SHIPPED | OUTPATIENT
Start: 2024-08-30

## 2024-07-01 RX ADMIN — CYANOCOBALAMIN 1000 MCG: 1000 INJECTION, SOLUTION INTRAMUSCULAR; SUBCUTANEOUS at 10:07

## 2024-07-01 RX ADMIN — KETOROLAC TROMETHAMINE 60 MG: 30 INJECTION, SOLUTION INTRAMUSCULAR; INTRAVENOUS at 10:07

## 2024-07-01 RX ADMIN — METHYLPREDNISOLONE ACETATE 160 MG: 80 INJECTION, SUSPENSION INTRA-ARTICULAR; INTRALESIONAL; INTRAMUSCULAR; SOFT TISSUE at 10:07

## 2024-07-01 NOTE — PROGRESS NOTES
Subjective:     Patient ID:  Tera Marquez    Chief Complaint:  Disease Management     History of Present Illness:  Mr. Marquez is a 68 year old male who presents to clinic for follow up on psoriatic arthritis and osteoarthritis. He is s/p cervical spine surgery 11/23 with Dr. Ramirez, which was successful. He may need additional lumbar spine surgery in the future.He has pain in his hands, shoulders, hips, and knees. He had R shoulder injected by Ortho 2/24 with good relief. He stopped lyrica since it was not helping much with pain/neuropathy. He was off celebrex s/p neck surgery and noticed more increased pain all over at that time. He reports generalized stiffness throughout his body. He is very active with biking, walking, and doing push ups daily.      Psoriasis on his elbow is improving with clobetasol.     He is taking Lunesta as needed for insomnia without side effects.  He is taking Norco on rare occasion for severe pain.      Current treatment:  1. Celebrex  2. Norco     Rheumatologic History:   - Diagnosis/es:  - Positive serologies:  - Infectious screening labs:  - Previous Treatments:  - Current Treatments:     Interval History:   Hospitalization since last office visit: No    Patient Active Problem List    Diagnosis Date Noted    Drug-induced immunodeficiency 07/01/2024    PSA (psoriatic arthritis) 02/29/2024    DDD (degenerative disc disease), lumbar 02/01/2013    Lumbar spondylosis 02/01/2013    Lumbar radiculopathy 12/21/2012    YUMIKO (left total hip arthroplasty)-4/19/12 04/27/2012     Past Surgical History:   Procedure Laterality Date    FOOT SURGERY      JOINT REPLACEMENT  4/18/2012    left hip    SINUS SURGERY       Social History     Tobacco Use    Smoking status: Never    Smokeless tobacco: Current    Tobacco comments:     occassional dip   Substance Use Topics    Alcohol use: No    Drug use: No     Family History   Problem Relation Name Age of Onset    Anesthesia problems Neg Hx      Clotting  disorder Neg Hx       Review of patient's allergies indicates:   Allergen Reactions    Ampicillin     Penicillin        Review of Systems   Review of Systems     Current Medications:  Current Outpatient Medications   Medication Instructions    aluminum & magnesium hydroxide-simethicone (MYLANTA MAX STRENGTH) 400-400-40 mg/5 mL suspension 5 mLs, Oral, Every 6 hours PRN    anastrozole (ARIMIDEX) 1 mg, Oral    APPLE CIDER VINEGAR ORAL Oral, Daily    apremilast (OTEZLA STARTER) 10 mg (4)-20 mg (4)-30 mg (47) DsPk Day 1: 10 mg in AM, Day 2: 10 mg BID (AM and PM), Day 3: 10 mg in AM and 20 mg in PM, Day 4: 20 mg BID (AM and PM), Day 5: 20 mg in AM and 30 mg in PM, Day 6: start maintenance dosing of 30 mg BID (AM and PM).    calcipotriene (DOVONOX) 0.005 % cream Topical (Top), 2 times daily    celecoxib (CELEBREX) 200 mg, Oral, Daily    cetirizine-pseudoephedrine 5-120 mg Tb12 1 tablet    clobetasol 0.05% (TEMOVATE) 0.05 % Oint Topical (Top), 2 times daily    cyclobenzaprine (FLEXERIL) 10 mg, Oral, 3 times daily    dorzolamide (TRUSOPT) 2 % ophthalmic solution 1 drop, Both Eyes, 2 times daily    eszopiclone (LUNESTA) 3 mg Tab TAKE 1 TABLET BY MOUTH NIGHTLY AS NEEDED    fluticasone propionate (FLONASE) 50 mcg/actuation nasal spray fluticasone propionate 50 mcg/actuation nasal spray,suspension    HYDROcodone-acetaminophen (NORCO)  mg per tablet Take 1 tablet by mouth every 6 (six) hours as needed for Pain.    HYDROcodone-acetaminophen (NORCO)  mg per tablet Take 1 tablet by mouth every 6 (six) hours as needed for Pain.    [START ON 8/1/2024] HYDROcodone-acetaminophen (NORCO)  mg per tablet Take 1 tablet by mouth every 6 (six) hours as needed for Pain.    [START ON 8/30/2024] HYDROcodone-acetaminophen (NORCO)  mg per tablet Take 1 tablet by mouth every 6 (six) hours as needed for Pain.    hydrocortisone 2.5 % ointment APPLY A SMALL AMOUNT 2 TIMES DAILY    montelukast (SINGULAIR) 10 mg, Oral     "omeprazole (PRILOSEC) 40 mg, Oral, Every morning    OTEZLA 30 mg, Oral, 2 times daily    pantoprazole (PROTONIX) 40 MG tablet Take 1 tablet (40 mg total) by mouth daily    pregabalin (LYRICA) 75 mg, Oral, 2 times daily    sodium chloride (OCEAN) 0.65 % nasal spray 2 drops in each nostril as needed    tadalafiL (CIALIS) 5 mg, Oral, Daily    triamcinolone acetonide 0.1% (KENALOG) 0.1 % ointment Topical (Top), 2 times daily         Objective:     Vitals:    07/01/24 0853   BP: (!) 154/87   Pulse: 97   Weight: 97.2 kg (214 lb 4.6 oz)   Height: 6' 3" (1.905 m)   PainSc:   8   PainLoc: Generalized      Body mass index is 26.78 kg/m².     Physical Examinations:  Physical Exam   Constitutional: He is oriented to person, place, and time. No distress.   HENT:   Head: Normocephalic and atraumatic.   Mouth/Throat: Oropharynx is clear and moist.   Eyes: Pupils are equal, round, and reactive to light.   Neck: No thyromegaly present.   Cardiovascular: Normal rate, regular rhythm and normal heart sounds. Exam reveals no gallop and no friction rub.   No murmur heard.  Pulmonary/Chest: Breath sounds normal. He has no wheezes. He has no rales. He exhibits no tenderness.   Abdominal: There is no abdominal tenderness. There is no rebound and no guarding.   Musculoskeletal:         General: Tenderness present.      Right shoulder: Tenderness present.      Left shoulder: Tenderness present.      Right elbow: Tenderness present.      Left elbow: Normal.      Left wrist: Normal.      Cervical back: Neck supple.      Right knee: Swelling and effusion present.      Left knee: Swelling and effusion present.   Lymphadenopathy:     He has no cervical adenopathy.   Neurological: He is alert and oriented to person, place, and time. He displays weakness. He exhibits abnormal muscle tone.   Reflex Scores:       Patellar reflexes are 2+ on the right side and 2+ on the left side.  Skin: No rash noted. No erythema. No pallor.   Psychiatric: Mood and " affect normal.   Vitals reviewed.      Right Side Rheumatological Exam     Examination finds the 2nd MCP, 3rd MCP, 4th MCP and 5th MCP normal.    The patient is tender to palpation of the shoulder and elbow    He has swelling of the knee    The patient has an enlarged wrist, 1st PIP, 2nd PIP, 3rd PIP, 4th PIP and 5th PIP    Shoulder Exam   Tenderness Location: acromioclavicular joint and posterior shoulder    Range of Motion   Active abduction:  abnormal   Adduction: abnormal  Sensation: normal    Knee Exam     Range of Motion   Extension:  normal   Flexion:  normal   Patellofemoral Crepitus: positive  Effusion: positive  Sensation: normal    Hip Exam   Tenderness Location: anterior and posterior    Range of Motion   Extension:  abnormal   Flexion:  abnormal   Sensation: normal    Elbow/Wrist Exam   Tenderness Location: no tenderness    Range of Motion   Elbow   Flexion:  normal   Sensation: normal    Muscle Strength (0-5 scale):  Neck Flexion:  3  Neck Extension: 3  Deltoid:  3  Biceps: 3/5   Triceps:  3  Quadriceps:  3   Distal Lower Extremity: 3    Left Side Rheumatological Exam     Examination finds the elbow, wrist, 1st MCP, 2nd MCP, 3rd MCP, 4th MCP and 5th MCP normal.    The patient is tender to palpation of the shoulder.    He has swelling of the knee    The patient has an enlarged 1st PIP, 2nd PIP, 3rd PIP, 4th PIP and 5th PIP.    Shoulder Exam   Tenderness Location: acromioclavicular joint and posterior shoulder    Range of Motion   Active abduction:  abnormal   Extension:  abnormal   Sensation: normal    Knee Exam     Range of Motion   Extension:  normal   Flexion:  normal     Patellofemoral Crepitus: positive  Effusion: positive  Sensation: normal    Hip Exam   Tenderness Location: anterior and posterior    Range of Motion   Extension:  abnormal   Flexion:  abnormal   Sensation: normal    Elbow/Wrist Exam     Range of Motion   Elbow   Flexion:  normal   Sensation: normal    Muscle Strength (0-5  scale):  Neck Flexion:  3  Neck Extension: 3  Deltoid:  3  Biceps: 3/5   Triceps:  3  Quadriceps:  3   Distal Lower Extremity: 3      Back/Neck Exam   General Inspection   Gait: normal       Tenderness Right paramedian tenderness of the Lower C-Spine, Lower L-Spine, Upper C-Spine, Upper L-Spine and SI Joint.Left paramedian tenderness of the Lower C-Spine, Lower L-Spine, Upper C-Spine, Upper L-Spine and SI Joint.    Back Range of Motion   Extension:  abnormal  Flexion:  abnormal  Lateral Bend Right:  abnormal  Lateral Bend Left:  abnormal  Rotation Right:  abnormal  Rotation Left:  abnormal    Neck Range of Motion   Flexion:  Limited and moderate  Extension:  Limited and moderate  Right Lateral Bend: abnormal  Left Lateral Bend: abnormal  Right Rotation: abnormal  Left Rotation: abnormal       Disease Assessment Scores:  Patient's Global Assessment of arthritis (0-10): 2  Physician's Global Assessment of arthritis (0-10): 2  Number of Tender Joints (0-28): 2  Number of Swollen Joints (0-28): 2        2/19/2024    12:12 PM   Rapid3 Question Responses and Scores   MDHAQ Score 0.2   Psychologic Score 1.1   Pain Score 8.5   When you awakened in the morning OVER THE LAST WEEK, did you feel stiff? Yes   If Yes, please indicate the number of hours until you are as limber as you will be for the day 1   Fatigue Score 0.5   Global Health Score 1.5   RAPID3 Score 3.56       Monitoring Lab Results:  Lab Results   Component Value Date    WBC 5.3 06/26/2024    RBC 4.63 06/26/2024    HGB 15.2 06/26/2024    HCT 45.5 06/26/2024    MCV 98.3 06/26/2024    MCH 32.8 06/26/2024    MCHC 33.4 06/26/2024    RDW 12.6 06/26/2024     06/26/2024        Lab Results   Component Value Date     06/26/2024    K 4.0 06/26/2024     06/26/2024    CO2 25 06/26/2024    GLU 95 06/26/2024    BUN 22 06/26/2024    CREATININE 1.04 06/26/2024    CALCIUM 9.3 06/26/2024    PROT 6.5 06/26/2024    ALBUMIN 4.2 06/26/2024    BILITOT 0.5 06/26/2024  "   ALKPHOS 91 04/10/2020    AST 34 06/26/2024    ALT 36 06/26/2024    EGFRNORACEVR 79 06/26/2024       Lab Results   Component Value Date    SEDRATE 2 06/26/2024    CRP 16.8 (H) 06/26/2024        No results found for: "GWUNYTQI20GI", "ZJEONUHT75"     No results found for: "CHOL", "HDL", "LDLCALC", "TRIG"    No results found for: "RF", "CCPANTIBODIE"  No results found for: "ANASCREEN", "ANATITER", "ANAPATTE", "DSDNA", "SMRNPAB", "SSAANTIBODY", "SSBANTIBODY", "HSB90PC", "JO1AB"  No results found for: "HLABB27"    Infectious Disease Screening:  No results found for: "HEPBSAG", "HEPBCAB", "HEPBSAB", "HEPBSURFABQU", "HEPBIGM"  No results found for: "HEPCAB", "HEPCVAB", "HCVQUANTRES"  No results found for: "TBGOLDPLUS", "QUANTTBGDPL"  No results found for: "QUANTIFERON", "SVCMT", "QUANTAGVALUE", "QUANTNILVALU", "QUANTMITOGEN", "QFTTBAG", "QINT"     Imaging: DEXA, Xrays, MRIs, CTs, etc    Old & Outside Medical Records:  Reviewed old and all outside medical records available in Care Everywhere     Assessment:     Pt is a 68 y.o. male with psoriatic arthritis. The patient has not achieved clinical remission. Plan is to continue apremilast (OTEZLA)      Encounter Diagnoses   Name Primary?    Drug-induced immunodeficiency Yes    PSA (psoriatic arthritis)     Lumbar radiculopathy     Primary osteoarthritis of both knees     Chronic pain syndrome     Achilles tendinitis, unspecified laterality     Tarsal tunnel syndrome of both lower extremities     Lumbar spondylosis     DDD (degenerative disc disease), lumbar     Primary insomnia    Tera was seen today for disease management.    Diagnoses and all orders for this visit:    Drug-induced immunodeficiency  -     cyanocobalamin injection 1,000 mcg  -     ketorolac injection 60 mg  -     methylPREDNISolone acetate injection 160 mg    PSA (psoriatic arthritis)  -     calcipotriene (DOVONOX) 0.005 % cream; Apply topically 2 (two) times daily.  -     Ambulatory referral/consult to " Physical Medicine Rehab; Future  -     pregabalin (LYRICA) 75 MG capsule; Take 1 capsule (75 mg total) by mouth 2 (two) times daily.  -     HYDROcodone-acetaminophen (NORCO)  mg per tablet; Take 1 tablet by mouth every 6 (six) hours as needed for Pain.  -     HYDROcodone-acetaminophen (NORCO)  mg per tablet; Take 1 tablet by mouth every 6 (six) hours as needed for Pain.  -     HYDROcodone-acetaminophen (NORCO)  mg per tablet; Take 1 tablet by mouth every 6 (six) hours as needed for Pain.  -     cyanocobalamin injection 1,000 mcg  -     ketorolac injection 60 mg  -     methylPREDNISolone acetate injection 160 mg  -     Sedimentation rate; Future  -     C-Reactive Protein; Future  -     Comprehensive Metabolic Panel; Future  -     CBC Auto Differential; Future    Lumbar radiculopathy  -     HYDROcodone-acetaminophen (NORCO)  mg per tablet; Take 1 tablet by mouth every 6 (six) hours as needed for Pain.  -     HYDROcodone-acetaminophen (NORCO)  mg per tablet; Take 1 tablet by mouth every 6 (six) hours as needed for Pain.  -     HYDROcodone-acetaminophen (NORCO)  mg per tablet; Take 1 tablet by mouth every 6 (six) hours as needed for Pain.  -     cyanocobalamin injection 1,000 mcg  -     ketorolac injection 60 mg  -     methylPREDNISolone acetate injection 160 mg  -     Sedimentation rate; Future  -     C-Reactive Protein; Future  -     Comprehensive Metabolic Panel; Future  -     CBC Auto Differential; Future    Primary osteoarthritis of both knees  -     Ambulatory referral/consult to Physical Medicine Rehab; Future  -     cyanocobalamin injection 1,000 mcg  -     ketorolac injection 60 mg  -     methylPREDNISolone acetate injection 160 mg  -     Sedimentation rate; Future  -     C-Reactive Protein; Future  -     Comprehensive Metabolic Panel; Future  -     CBC Auto Differential; Future    Chronic pain syndrome  -     HYDROcodone-acetaminophen (NORCO)  mg per tablet; Take 1  tablet by mouth every 6 (six) hours as needed for Pain.  -     HYDROcodone-acetaminophen (NORCO)  mg per tablet; Take 1 tablet by mouth every 6 (six) hours as needed for Pain.  -     HYDROcodone-acetaminophen (NORCO)  mg per tablet; Take 1 tablet by mouth every 6 (six) hours as needed for Pain.  -     cyanocobalamin injection 1,000 mcg  -     ketorolac injection 60 mg  -     methylPREDNISolone acetate injection 160 mg  -     Sedimentation rate; Future  -     C-Reactive Protein; Future  -     Comprehensive Metabolic Panel; Future  -     CBC Auto Differential; Future    Achilles tendinitis, unspecified laterality  -     HYDROcodone-acetaminophen (NORCO)  mg per tablet; Take 1 tablet by mouth every 6 (six) hours as needed for Pain.  -     HYDROcodone-acetaminophen (NORCO)  mg per tablet; Take 1 tablet by mouth every 6 (six) hours as needed for Pain.  -     HYDROcodone-acetaminophen (NORCO)  mg per tablet; Take 1 tablet by mouth every 6 (six) hours as needed for Pain.  -     cyanocobalamin injection 1,000 mcg  -     ketorolac injection 60 mg  -     methylPREDNISolone acetate injection 160 mg  -     Sedimentation rate; Future  -     C-Reactive Protein; Future  -     Comprehensive Metabolic Panel; Future  -     CBC Auto Differential; Future    Tarsal tunnel syndrome of both lower extremities  -     HYDROcodone-acetaminophen (NORCO)  mg per tablet; Take 1 tablet by mouth every 6 (six) hours as needed for Pain.  -     HYDROcodone-acetaminophen (NORCO)  mg per tablet; Take 1 tablet by mouth every 6 (six) hours as needed for Pain.  -     HYDROcodone-acetaminophen (NORCO)  mg per tablet; Take 1 tablet by mouth every 6 (six) hours as needed for Pain.  -     cyanocobalamin injection 1,000 mcg  -     ketorolac injection 60 mg  -     methylPREDNISolone acetate injection 160 mg    Lumbar spondylosis  -     HYDROcodone-acetaminophen (NORCO)  mg per tablet; Take 1 tablet by mouth  every 6 (six) hours as needed for Pain.  -     HYDROcodone-acetaminophen (NORCO)  mg per tablet; Take 1 tablet by mouth every 6 (six) hours as needed for Pain.  -     HYDROcodone-acetaminophen (NORCO)  mg per tablet; Take 1 tablet by mouth every 6 (six) hours as needed for Pain.  -     cyanocobalamin injection 1,000 mcg  -     ketorolac injection 60 mg  -     methylPREDNISolone acetate injection 160 mg  -     Sedimentation rate; Future  -     C-Reactive Protein; Future  -     Comprehensive Metabolic Panel; Future  -     CBC Auto Differential; Future    DDD (degenerative disc disease), lumbar  -     HYDROcodone-acetaminophen (NORCO)  mg per tablet; Take 1 tablet by mouth every 6 (six) hours as needed for Pain.  -     HYDROcodone-acetaminophen (NORCO)  mg per tablet; Take 1 tablet by mouth every 6 (six) hours as needed for Pain.  -     HYDROcodone-acetaminophen (NORCO)  mg per tablet; Take 1 tablet by mouth every 6 (six) hours as needed for Pain.  -     cyanocobalamin injection 1,000 mcg  -     ketorolac injection 60 mg  -     methylPREDNISolone acetate injection 160 mg  -     Sedimentation rate; Future  -     C-Reactive Protein; Future  -     Comprehensive Metabolic Panel; Future  -     CBC Auto Differential; Future    Primary insomnia  -     HYDROcodone-acetaminophen (NORCO)  mg per tablet; Take 1 tablet by mouth every 6 (six) hours as needed for Pain.  -     HYDROcodone-acetaminophen (NORCO)  mg per tablet; Take 1 tablet by mouth every 6 (six) hours as needed for Pain.  -     HYDROcodone-acetaminophen (NORCO)  mg per tablet; Take 1 tablet by mouth every 6 (six) hours as needed for Pain.  -     cyanocobalamin injection 1,000 mcg  -     ketorolac injection 60 mg  -     methylPREDNISolone acetate injection 160 mg         1. Norco refill I have  check [place] prescription monitoring program site and no unusual or abnormal behavior has occurred pt understand the risk and  benefits of taking opioid medications and has decided to continue the  medication     2. Nurse injection  3. Labs ordered     Follow-up 4 months      More than 50% of the  40 minute encounter was spent face to face counseling the patient regarding current status and future plan of care as well as side effects  of the medications. All questions were answered to patient's satisfaction also includes  non-face to face time preparing to see the patient (eg, review of tests), Obtaining and/or reviewing separately obtained history, Documenting clinical information in the electronic or other health record, Independently interpreting results

## 2024-07-29 DIAGNOSIS — G57.53 TARSAL TUNNEL SYNDROME OF BOTH LOWER EXTREMITIES: ICD-10-CM

## 2024-07-29 DIAGNOSIS — M76.60 ACHILLES TENDINITIS, UNSPECIFIED LATERALITY: ICD-10-CM

## 2024-07-29 DIAGNOSIS — M47.816 LUMBAR SPONDYLOSIS: ICD-10-CM

## 2024-07-29 DIAGNOSIS — M54.16 LUMBAR RADICULOPATHY: ICD-10-CM

## 2024-07-29 DIAGNOSIS — G89.4 CHRONIC PAIN SYNDROME: ICD-10-CM

## 2024-07-29 DIAGNOSIS — M51.36 DDD (DEGENERATIVE DISC DISEASE), LUMBAR: ICD-10-CM

## 2024-07-29 DIAGNOSIS — F51.01 PRIMARY INSOMNIA: ICD-10-CM

## 2024-07-30 RX ORDER — ESZOPICLONE 3 MG/1
TABLET, FILM COATED ORAL
Qty: 30 TABLET | Refills: 4 | Status: SHIPPED | OUTPATIENT
Start: 2024-07-30

## 2024-08-30 ENCOUNTER — OFFICE VISIT (OUTPATIENT)
Dept: PHYSICAL MEDICINE AND REHAB | Facility: CLINIC | Age: 69
End: 2024-08-30
Payer: MEDICARE

## 2024-08-30 DIAGNOSIS — M75.42 SHOULDER IMPINGEMENT SYNDROME, LEFT: Primary | ICD-10-CM

## 2024-08-30 DIAGNOSIS — M17.0 PRIMARY OSTEOARTHRITIS OF BOTH KNEES: ICD-10-CM

## 2024-08-30 DIAGNOSIS — M75.41 SHOULDER IMPINGEMENT SYNDROME, RIGHT: ICD-10-CM

## 2024-08-30 DIAGNOSIS — L40.50 PSA (PSORIATIC ARTHRITIS): ICD-10-CM

## 2024-08-30 PROCEDURE — 99999 PR PBB SHADOW E&M-EST. PATIENT-LVL II: CPT | Mod: PBBFAC,,, | Performed by: STUDENT IN AN ORGANIZED HEALTH CARE EDUCATION/TRAINING PROGRAM

## 2024-08-30 PROCEDURE — 20610 DRAIN/INJ JOINT/BURSA W/O US: CPT | Mod: LT,S$GLB,, | Performed by: STUDENT IN AN ORGANIZED HEALTH CARE EDUCATION/TRAINING PROGRAM

## 2024-08-30 PROCEDURE — 99204 OFFICE O/P NEW MOD 45 MIN: CPT | Mod: 25,S$GLB,, | Performed by: STUDENT IN AN ORGANIZED HEALTH CARE EDUCATION/TRAINING PROGRAM

## 2024-08-30 PROCEDURE — 1101F PT FALLS ASSESS-DOCD LE1/YR: CPT | Mod: CPTII,S$GLB,, | Performed by: STUDENT IN AN ORGANIZED HEALTH CARE EDUCATION/TRAINING PROGRAM

## 2024-08-30 PROCEDURE — 3288F FALL RISK ASSESSMENT DOCD: CPT | Mod: CPTII,S$GLB,, | Performed by: STUDENT IN AN ORGANIZED HEALTH CARE EDUCATION/TRAINING PROGRAM

## 2024-08-30 RX ADMIN — METHYLPREDNISOLONE ACETATE 40 MG: 40 INJECTION, SUSPENSION INTRA-ARTICULAR; INTRALESIONAL; INTRAMUSCULAR; SOFT TISSUE at 10:08

## 2024-08-30 NOTE — PROGRESS NOTES
PHYSICAL MEDICINE AND REHABILITATION  New Patient Consult:    Subjective:   Chief Complaint:    Bilateral knee pain and bilateral shoulder pain as well as bruising of the right thigh    HPI: Tera Marquez is a 68 y.o. male with  has a past medical history of Arthritis and Back pain. She was sent to me for consultation for bilateral knee pain.  He also would like to discuss his bilateral shoulder pain as well as some bruising in his right anterior thigh.      For the shoulders, this has been a chronic issue.  Denies any associated trauma.  He has seen orthopedics in the past who have discussed surgery with him.  He has also had steroid injections in the past which do provided proximally 30 days of pain relief.  He notes a bone spur in the left shoulder.  He also notes some atrophy of the left superior shoulder along the superior scapula.  He describes the pain as sharp, aching and pressure.  Worse with using his shoulder.  He has a history of 2 cervical spine surgeries: The most recent was in November of 2023 and the 2nd was 2-3 years prior to that.  X-rays of the right shoulder from 03/06/2023 show mild AC joint hypertrophy and no significant glenohumeral joint where as well as age-appropriate subacromial spurring.  X-rays of the left shoulder from 07/17/2023 show moderate AC joint arthropathy as well as moderate glenohumeral osteoarthritis.  A note from Dr. Joey Kim on 05/28/2024 shows a left subacromial bursa injection with 60 mg of Kenalog.    For the knees, this is a chronic issue.  He recalls having steroid injections in the past without significant relief.  He has seen orthopedics in the past for the right knee and was diagnosed with a degenerative right knee condition.  X-rays of the right knee from 04/22/2024 show moderate knee degenerative changes.  He was given an intra-articular injection with 60 mg of Kenalog at his previous visit with Dr. Joey Ghosh.     For the right thigh, this started  as a very strong spasms.  He reports associated bruising in the distal thigh.  The spasms have not occurred since.  He does have associated tenderness in the area where the bruising is currently present.  Symptoms have been improving.    Review of Systems  Weakness and joint swelling    Imaging/Diagnostic Studies  XR SHOULDER COMPLETE 2 OR MORE VIEWS RIGHT  Order: [id number]  Narrative    Final Impression    4 views right shoulder: No evidence of acute bony finding.  No obvious  sign of fracture or dislocation.  Mild acromioclavicular joint  hypertrophy.  No significant glenohumeral joint wear, age-appropriate.    Subacromial spurring    Joey Ghosh MD 3/6/2023 12:13 PM  ====    XR SHOULDER 2+ VIEW LEFT    History of left shoulder pain. 4 views.    There is moderate AC joint arthropathy. There is moderate glenohumeral osteoarthritis.  No acute abnormality noted.  Exam End: 07/17/23 08:34 Last Resulted: 07/17/23 08:52     ===  Exam:  XR KNEE 3 VIEW RIGHT  Date of Exam:  4/22/2024 10:29 AM  Clinical Indication: , pain  Comparison:  None.  Technique:  XR KNEE 3 VIEW RIGHT  Findings:  No acute fracture or malalignment. No joint effusion.  Moderate knee degenerative changes.  Impression:  Moderately degenerative changes.  Exam End: 04/22/24 10:55 Last Resulted: 04/22/24 14:36       Past Medical History:   Diagnosis Date    Arthritis     Back pain        Past Surgical History:   Procedure Laterality Date    FOOT SURGERY      JOINT REPLACEMENT  4/18/2012    left hip    SINUS SURGERY         Review of patient's allergies indicates:   Allergen Reactions    Ampicillin     Penicillin        Current Outpatient Medications   Medication Sig Dispense Refill    aluminum & magnesium hydroxide-simethicone (MYLANTA MAX STRENGTH) 400-400-40 mg/5 mL suspension Take 5 mLs by mouth every 6 (six) hours as needed for Indigestion.      anastrozole (ARIMIDEX) 1 mg Tab Take 1 mg by mouth.      apremilast (OTEZLA) 30 mg Tab Take 1 tablet  (30 mg total) by mouth 2 (two) times a day. 60 tablet 11    calcipotriene (DOVONOX) 0.005 % cream Apply topically 2 (two) times daily. 120 g 3    celecoxib (CELEBREX) 200 MG capsule Take 1 capsule (200 mg total) by mouth once daily. 30 capsule 6    cetirizine-pseudoephedrine 5-120 mg Tb12 1 tablet      clobetasol 0.05% (TEMOVATE) 0.05 % Oint Apply topically 2 (two) times daily. 45 g 4    cyclobenzaprine (FLEXERIL) 10 MG tablet Take 10 mg by mouth 3 (three) times daily.      dorzolamide (TRUSOPT) 2 % ophthalmic solution Place 1 drop into both eyes 2 (two) times daily.      eszopiclone (LUNESTA) 3 mg Tab TAKE 1 TABLET BY MOUTH NIGHTLY AS NEEDED 30 tablet 4    fluticasone propionate (FLONASE) 50 mcg/actuation nasal spray fluticasone propionate 50 mcg/actuation nasal spray,suspension      HYDROcodone-acetaminophen (NORCO)  mg per tablet Take 1 tablet by mouth every 6 (six) hours as needed for Pain. 120 tablet 0    HYDROcodone-acetaminophen (NORCO)  mg per tablet Take 1 tablet by mouth every 6 (six) hours as needed for Pain. 120 tablet 0    HYDROcodone-acetaminophen (NORCO)  mg per tablet Take 1 tablet by mouth every 6 (six) hours as needed for Pain. 120 tablet 0    HYDROcodone-acetaminophen (NORCO)  mg per tablet Take 1 tablet by mouth every 6 (six) hours as needed for Pain. 120 tablet 0    hydrocortisone 2.5 % ointment APPLY A SMALL AMOUNT 2 TIMES DAILY      montelukast (SINGULAIR) 10 mg tablet Take 10 mg by mouth.      omeprazole (PRILOSEC) 40 MG capsule Take 40 mg by mouth every morning.       pantoprazole (PROTONIX) 40 MG tablet Take 1 tablet (40 mg total) by mouth daily      pregabalin (LYRICA) 75 MG capsule Take 1 capsule (75 mg total) by mouth 2 (two) times daily. 60 capsule 6    sodium chloride (OCEAN) 0.65 % nasal spray 2 drops in each nostril as needed      tadalafiL (CIALIS) 5 MG tablet Take 5 mg by mouth once daily.      triamcinolone acetonide 0.1% (KENALOG) 0.1 % ointment Apply  topically 2 (two) times daily. 80 g 3    APPLE CIDER VINEGAR ORAL Take by mouth once daily.       apremilast (OTEZLA STARTER) 10 mg (4)-20 mg (4)-30 mg (47) DsPk Day 1: 10 mg in AM, Day 2: 10 mg BID (AM and PM), Day 3: 10 mg in AM and 20 mg in PM, Day 4: 20 mg BID (AM and PM), Day 5: 20 mg in AM and 30 mg in PM, Day 6: start maintenance dosing of 30 mg BID (AM and PM). 55 tablet 0     No current facility-administered medications for this visit.       Family History   Problem Relation Name Age of Onset    Anesthesia problems Neg Hx      Clotting disorder Neg Hx         Social History     Socioeconomic History    Marital status:    Tobacco Use    Smoking status: Never    Smokeless tobacco: Current    Tobacco comments:     occassional dip   Substance and Sexual Activity    Alcohol use: No    Drug use: No     Social Determinants of Health     Financial Resource Strain: Unknown (10/10/2018)    [place] [facility] Financial Resource Strain (CARDIA)     Difficulty of Paying Living Expenses: Patient declined   Food Insecurity: Unknown (10/10/2018)    [place] [facility]    Hunger Vital Sign     Worried About Running Out of Food in the Last Year: Patient declined     Ran Out of Food in the Last Year: Patient declined   Physical Activity: Unknown (10/10/2018)    [place] [facility]    Exercise Vital Sign     Days of Exercise per Week: Patient declined     Minutes of Exercise per Session: Patient declined   Stress: Unknown (10/10/2018)    [place] [facility]    [employer] [place] [employer] - Occupational Stress Questionnaire     Feeling of Stress : Patient declined         Objective:    There were no vitals taken for this visit.  Physical Exam  Constitutional:       Appearance: Normal appearance.   HENT:      Head:  Normocephalic and atraumatic.   Eyes:      Extraocular Movements: Extraocular movements intact.   Cardiovascular:      Rate and Rhythm: Normal rate.   Pulmonary:      Effort: Pulmonary effort is normal.   Abdominal:      General: Abdomen is flat.   Musculoskeletal:         General: Normal range of motion.      Right knee: No effusion.      Left knee: No effusion.   Skin:     General: Skin is warm and dry.   Neurological:      General: No focal deficit present.      Mental Status: He is alert and oriented to person, place, and time. Mental status is at baseline.   Psychiatric:         Mood and Affect: Mood normal.         Behavior: Behavior normal.         Thought Content: Thought content normal.        Right Knee Exam     Tenderness   The patient is experiencing tenderness in the lateral joint line and medial joint line.    Range of Motion   Extension:  normal     Other   Erythema: absent  Scars: absent  Sensation: normal  Pulse: present  Swelling: none  Effusion: no effusion present      Left Knee Exam     Tenderness   The patient is experiencing tenderness in the lateral joint line and medial joint line.    Range of Motion   Extension:  normal     Other   Erythema: absent  Scars: absent  Sensation: normal  Pulse: present  Swelling: none  Effusion: no effusion present      Right Shoulder Exam     Muscle Strength   The patient has normal right shoulder strength.  Abduction: 4/5   Internal rotation: 5/5   External rotation: 5/5   Supraspinatus: 4/5   Subscapularis: 5/5   Biceps: 4/5     Tests   Marav[place] test: positive  Impingement: positive  Sulcus: absent    Other   Erythema: absent  Scars: absent  Sensation: normal  Pulse: present    Comments:  + jobes  Negative [place]'s      Left Shoulder Exam     Muscle Strength   Abduction: 4/5   Internal rotation: 5/5   External rotation: 5/5   Supraspinatus: 4/5   Subscapularis: 5/5   Biceps: 5/5     Tests   Maravilla test: positive  Impingement: positive  Sulcus:  present    Other   Erythema: absent  Scars: present  Sensation: normal  Pulse: present     Comments:  + jobes  Mildly positive [place]'s               Assessment:       ICD-10-CM ICD-9-CM    1. Shoulder impingement syndrome, left  M75.42 726.2       2. PSA (psoriatic arthritis)  L40.50 696.0 Ambulatory referral/consult to Physical Medicine Rehab      3. Primary osteoarthritis of both knees  M17.0 715.16 Ambulatory referral/consult to Physical Medicine Rehab      Prior authorization Order      4. Shoulder impingement syndrome, right  M75.41 726.2             Plan:   1. Shoulder impingement syndrome, left  Assessment & Plan:  Home exercise program  Left subacromial bursa injection with 6 mg of betamethasone and 2 cc of 1% lidocaine.  See procedure note for details  If insufficient relief with today's injection, consider a left glenohumeral joint injection at next visit.        2. PSA (psoriatic arthritis)  -     Ambulatory referral/consult to Physical Medicine Rehab    3. Primary osteoarthritis of both knees  Assessment & Plan:  Prior authorization submitted for bilateral Durolane injections  Unsustained relief with steroid injections in the past   Kellgren Nick score of 3  Return to clinic in 2 weeks for bilateral durolane injections.      Orders:  -     Ambulatory referral/consult to Physical Medicine Rehab  -     Prior authorization Order    4. Shoulder impingement syndrome, right  Assessment & Plan:  Home exercise program  Hold off on injection for now.    Return to clinic [place].             Beny Archibald MD  Physical Medicine & Rehabilitation     Disclaimer:  This note may have been prepared using voice recognition software, it may have not been extensively proofed, as such there could be errors within the text such as sound alike errors.  Contact the author of this note for clarification.

## 2024-09-04 PROBLEM — M75.41 SHOULDER IMPINGEMENT SYNDROME, RIGHT: Status: ACTIVE | Noted: 2024-09-04

## 2024-09-04 PROBLEM — M17.0 PRIMARY OSTEOARTHRITIS OF BOTH KNEES: Status: ACTIVE | Noted: 2024-09-04

## 2024-09-04 PROBLEM — M75.42 SHOULDER IMPINGEMENT SYNDROME, LEFT: Status: ACTIVE | Noted: 2024-09-04

## 2024-09-04 RX ORDER — METHYLPREDNISOLONE ACETATE 40 MG/ML
40 INJECTION, SUSPENSION INTRA-ARTICULAR; INTRALESIONAL; INTRAMUSCULAR; SOFT TISSUE
Status: DISCONTINUED | OUTPATIENT
Start: 2024-08-30 | End: 2024-09-04 | Stop reason: HOSPADM

## 2024-09-04 NOTE — ASSESSMENT & PLAN NOTE
Home exercise program  Left subacromial bursa injection with 6 mg of betamethasone and 2 cc of 1% lidocaine.  See procedure note for details  If insufficient relief with today's injection, consider a left glenohumeral joint injection at next visit.

## 2024-09-04 NOTE — PROCEDURES
Large Joint Aspiration/Injection: L subacromial bursa    Date/Time: 8/30/2024 10:00 AM    Performed by: Beny Archibald MD  Authorized by: Beny Archibald MD    Consent Done?:  Yes (Verbal)  Indications:  Arthritis, diagnostic evaluation and pain  Site marked: the procedure site was marked    Timeout: prior to procedure the correct patient, procedure, and site was verified    Prep: patient was prepped and draped in usual sterile fashion    Local anesthesia used?: No      Details:  Needle Size:  25 G  Ultrasonic Guidance for needle placement?: No    Approach:  Posterior  Location:  Shoulder  Site:  L subacromial bursa  Medications:  40 mg methylPREDNISolone acetate 40 mg/mL  Medications comment:  2 cc of lidocaine mixed with 40 mg of Depo-Medrol  Patient tolerance:  Patient tolerated the procedure well with no immediate complications

## 2024-09-04 NOTE — ASSESSMENT & PLAN NOTE
Prior authorization submitted for bilateral Durolane injections  Unsustained relief with steroid injections in the past   Kellgren Nick score of 3  Return to clinic in 2 weeks for bilateral durolane injections.

## 2024-09-05 ENCOUNTER — TELEPHONE (OUTPATIENT)
Dept: PHYSICAL MEDICINE AND REHAB | Facility: CLINIC | Age: 69
End: 2024-09-05
Payer: MEDICARE

## 2024-09-05 NOTE — TELEPHONE ENCOUNTER
----- Message from Jose Chambers LPN sent at 9/4/2024  4:35 PM CDT -----  Regarding: FW: advice    ----- Message -----  From: Lacey Moreno  Sent: 9/4/2024   4:01 PM CDT  To: [facility] VALLES Staff  Subject: advice                                           Type:  Needs Medical Advice    Who Called: pt    Best Call Back Number: [phone]      Additional Information: pt st that he had a miss call from clinic wants a rt call.  please call to discuss.

## 2024-09-12 ENCOUNTER — OFFICE VISIT (OUTPATIENT)
Dept: PHYSICAL MEDICINE AND REHAB | Facility: CLINIC | Age: 69
End: 2024-09-12
Payer: MEDICARE

## 2024-09-12 DIAGNOSIS — M17.0 PRIMARY OSTEOARTHRITIS OF BOTH KNEES: Primary | ICD-10-CM

## 2024-09-12 PROCEDURE — 99999 PR PBB SHADOW E&M-EST. PATIENT-LVL III: CPT | Mod: PBBFAC,,, | Performed by: STUDENT IN AN ORGANIZED HEALTH CARE EDUCATION/TRAINING PROGRAM

## 2024-09-12 NOTE — PROCEDURES
Large Joint Aspiration/Injection: bilateral knee (Durolane)    Date/Time: 9/12/2024 12:00 PM    Performed by: Beny Archibald MD  Authorized by: Beny Archibald MD    Consent Done?:  Yes (Verbal)  Indications:  Arthritis, diagnostic evaluation and pain  Site marked: the procedure site was marked    Timeout: prior to procedure the correct patient, procedure, and site was verified    Prep: patient was prepped and draped in usual sterile fashion    Local anesthesia used?: No (ethyl chloride spray)      Details:  Needle Size:  22 G  Ultrasonic Guidance for needle placement?: No    Approach:  Anterolateral  Location:  Knee  Laterality:  Bilateral  Site:  Bilateral knee  Medications (Right):  60 mg hyaluronate sodium, stabilized (DUROLANE) 60 mg/3 mL  Medications (Left):  60 mg hyaluronate sodium, stabilized (DUROLANE) 60 mg/3 mL  Patient tolerance:  Patient tolerated the procedure well with no immediate complications

## 2024-11-06 DIAGNOSIS — L40.50 PSA (PSORIATIC ARTHRITIS): ICD-10-CM

## 2024-11-07 RX ORDER — CELECOXIB 200 MG/1
200 CAPSULE ORAL DAILY
Qty: 30 CAPSULE | Refills: 6 | Status: SHIPPED | OUTPATIENT
Start: 2024-11-07

## 2024-11-12 ENCOUNTER — TELEPHONE (OUTPATIENT)
Dept: PHYSICAL MEDICINE AND REHAB | Facility: CLINIC | Age: 69
End: 2024-11-12
Payer: MEDICARE

## 2024-11-12 NOTE — TELEPHONE ENCOUNTER
----- Message from Brittny sent at 11/12/2024  3:05 PM CST -----  Contact: wife  Type:  Needs Medical Advice    Who Called: wife   Symptoms (please be specific): needs nurse to giver her a call,    Would the patient rather a call back or a response via [facility]? call  Best Call Back Number: [phone]    Additional Information: please advise and thank you.

## 2024-11-14 ENCOUNTER — TELEPHONE (OUTPATIENT)
Dept: PHYSICAL MEDICINE AND REHAB | Facility: CLINIC | Age: 69
End: 2024-11-14
Payer: MEDICARE

## 2024-11-14 NOTE — TELEPHONE ENCOUNTER
----- Message from Brittny sent at 11/14/2024 11:42 AM CST -----  Contact: wife  Type:  Needs Medical Advice    Who Called: wife Scotty  Symptoms (please be specific): needs to speak to  regarding an appt for next year  Would the patient rather a call back or a response via [facility]? call  Best Call Back Number: [phone]    Additional Information: please advise and thank you.

## 2024-11-25 ENCOUNTER — OFFICE VISIT (OUTPATIENT)
Dept: RHEUMATOLOGY | Facility: CLINIC | Age: 69
End: 2024-11-25
Payer: MEDICARE

## 2024-11-25 VITALS
SYSTOLIC BLOOD PRESSURE: 170 MMHG | BODY MASS INDEX: 27.1 KG/M2 | DIASTOLIC BLOOD PRESSURE: 90 MMHG | HEIGHT: 75 IN | HEART RATE: 92 BPM | WEIGHT: 218 LBS

## 2024-11-25 DIAGNOSIS — G89.4 CHRONIC PAIN SYNDROME: ICD-10-CM

## 2024-11-25 DIAGNOSIS — M54.16 LUMBAR RADICULOPATHY: ICD-10-CM

## 2024-11-25 DIAGNOSIS — G57.53 TARSAL TUNNEL SYNDROME OF BOTH LOWER EXTREMITIES: ICD-10-CM

## 2024-11-25 DIAGNOSIS — L40.50 PSA (PSORIATIC ARTHRITIS): ICD-10-CM

## 2024-11-25 DIAGNOSIS — F51.01 PRIMARY INSOMNIA: ICD-10-CM

## 2024-11-25 DIAGNOSIS — M47.816 LUMBAR SPONDYLOSIS: ICD-10-CM

## 2024-11-25 DIAGNOSIS — M76.60 ACHILLES TENDINITIS, UNSPECIFIED LATERALITY: ICD-10-CM

## 2024-11-25 DIAGNOSIS — M51.369 DDD (DEGENERATIVE DISC DISEASE), LUMBAR: ICD-10-CM

## 2024-11-25 DIAGNOSIS — L40.50 PSA (PSORIATIC ARTHRITIS): Primary | ICD-10-CM

## 2024-11-25 PROCEDURE — 96372 THER/PROPH/DIAG INJ SC/IM: CPT | Mod: S$GLB,,, | Performed by: PHYSICIAN ASSISTANT

## 2024-11-25 PROCEDURE — 1160F RVW MEDS BY RX/DR IN RCRD: CPT | Mod: CPTII,S$GLB,, | Performed by: PHYSICIAN ASSISTANT

## 2024-11-25 PROCEDURE — 4010F ACE/ARB THERAPY RXD/TAKEN: CPT | Mod: CPTII,S$GLB,, | Performed by: PHYSICIAN ASSISTANT

## 2024-11-25 PROCEDURE — 3288F FALL RISK ASSESSMENT DOCD: CPT | Mod: CPTII,S$GLB,, | Performed by: PHYSICIAN ASSISTANT

## 2024-11-25 PROCEDURE — 3080F DIAST BP >= 90 MM HG: CPT | Mod: CPTII,S$GLB,, | Performed by: PHYSICIAN ASSISTANT

## 2024-11-25 PROCEDURE — 1101F PT FALLS ASSESS-DOCD LE1/YR: CPT | Mod: CPTII,S$GLB,, | Performed by: PHYSICIAN ASSISTANT

## 2024-11-25 PROCEDURE — 1159F MED LIST DOCD IN RCRD: CPT | Mod: CPTII,S$GLB,, | Performed by: PHYSICIAN ASSISTANT

## 2024-11-25 PROCEDURE — 99999 PR PBB SHADOW E&M-EST. PATIENT-LVL IV: CPT | Mod: PBBFAC,,, | Performed by: PHYSICIAN ASSISTANT

## 2024-11-25 PROCEDURE — 99214 OFFICE O/P EST MOD 30 MIN: CPT | Mod: 25,S$GLB,, | Performed by: PHYSICIAN ASSISTANT

## 2024-11-25 PROCEDURE — 3008F BODY MASS INDEX DOCD: CPT | Mod: CPTII,S$GLB,, | Performed by: PHYSICIAN ASSISTANT

## 2024-11-25 PROCEDURE — 3077F SYST BP >= 140 MM HG: CPT | Mod: CPTII,S$GLB,, | Performed by: PHYSICIAN ASSISTANT

## 2024-11-25 PROCEDURE — 1125F AMNT PAIN NOTED PAIN PRSNT: CPT | Mod: CPTII,S$GLB,, | Performed by: PHYSICIAN ASSISTANT

## 2024-11-25 RX ORDER — CYANOCOBALAMIN 1000 UG/ML
1000 INJECTION, SOLUTION INTRAMUSCULAR; SUBCUTANEOUS
Status: COMPLETED | OUTPATIENT
Start: 2024-11-25 | End: 2024-11-25

## 2024-11-25 RX ORDER — POLYETHYLENE GLYCOL 3350 17 G/17G
17 POWDER, FOR SOLUTION ORAL DAILY
COMMUNITY
Start: 2024-11-11

## 2024-11-25 RX ORDER — RISANKIZUMAB-RZAA 150 MG/ML
150 INJECTION SUBCUTANEOUS
Qty: 1 ML | Refills: 1 | Status: SHIPPED | OUTPATIENT
Start: 2024-11-25

## 2024-11-25 RX ORDER — RISANKIZUMAB-RZAA 150 MG/ML
150 INJECTION SUBCUTANEOUS
Qty: 1 ML | Refills: 4 | Status: SHIPPED | OUTPATIENT
Start: 2024-11-25

## 2024-11-25 RX ORDER — METHYLPREDNISOLONE ACETATE 80 MG/ML
160 INJECTION, SUSPENSION INTRA-ARTICULAR; INTRALESIONAL; INTRAMUSCULAR; SOFT TISSUE
Status: COMPLETED | OUTPATIENT
Start: 2024-11-25 | End: 2024-11-25

## 2024-11-25 RX ORDER — KETOROLAC TROMETHAMINE 30 MG/ML
60 INJECTION, SOLUTION INTRAMUSCULAR; INTRAVENOUS
Status: COMPLETED | OUTPATIENT
Start: 2024-11-25 | End: 2024-11-25

## 2024-11-25 RX ORDER — ONDANSETRON 8 MG/1
8 TABLET, ORALLY DISINTEGRATING ORAL EVERY 6 HOURS PRN
COMMUNITY
Start: 2024-11-11

## 2024-11-25 RX ADMIN — KETOROLAC TROMETHAMINE 60 MG: 30 INJECTION, SOLUTION INTRAMUSCULAR; INTRAVENOUS at 12:11

## 2024-11-25 RX ADMIN — METHYLPREDNISOLONE ACETATE 160 MG: 80 INJECTION, SUSPENSION INTRA-ARTICULAR; INTRALESIONAL; INTRAMUSCULAR; SOFT TISSUE at 12:11

## 2024-11-25 RX ADMIN — CYANOCOBALAMIN 1000 MCG: 1000 INJECTION, SOLUTION INTRAMUSCULAR; SUBCUTANEOUS at 12:11

## 2024-11-25 ASSESSMENT — ROUTINE ASSESSMENT OF PATIENT INDEX DATA (RAPID3)
PSYCHOLOGICAL DISTRESS SCORE: 0
FATIGUE SCORE: 1.1
MDHAQ FUNCTION SCORE: 1.2
PAIN SCORE: 8.5
PATIENT GLOBAL ASSESSMENT SCORE: 6.5
TOTAL RAPID3 SCORE: 6.33

## 2024-11-25 NOTE — PROGRESS NOTES
Subjective:       Patient ID: Tera Marquez is a 69 y.o. male.    Chief Complaint: Disease Management      Mr. Marquez is a 69 year old male who presents to clinic for follow up on psoriatic arthritis and osteoarthritis. He stopped otezla after 5-6 months of treatment due to GI upset and diarrhea. He tried once daily dosing, but still has side effects. He did not feel this helped much with his joint pain or skin rash. He continues to have pain in his hands, shoulders, hips, and knees. He is followed by [facility] for shoulder injections prn. He restarted lyrica and he is tolerating this.     He is s/p cervical spine surgery 11/23 with Dr. Ramirez, which was successful. He may need additional lumbar spine surgery in the future.    He reports generalized stiffness throughout his body. He is very active with biking, walking, and doing push ups daily.     Psoriasis on his elbow is unchanged.    He is taking Lunesta as needed for insomnia without side effects.  He is taking Norco on rare occasion for severe pain.     I reviewed recent labs.      Current treatment:  1. Celebrex  2. Norco    Prior treatment:   1. Ssz  2. otezla      Review of Systems   Constitutional:  Positive for activity change. Negative for chills, fatigue, fever and unexpected weight change.   HENT:  Negative for mouth sores and trouble swallowing.    Eyes:  Negative for redness and visual disturbance.   Respiratory:  Negative for cough, shortness of breath and wheezing.    Cardiovascular:  Negative for chest pain, palpitations and leg swelling.   Gastrointestinal:  Negative for abdominal pain, constipation, diarrhea, nausea and vomiting.   Genitourinary:  Negative for genital sores.   Musculoskeletal:  Positive for arthralgias, back pain, joint swelling, neck pain and neck stiffness. Negative for myalgias.   Skin:  Positive for rash.   Neurological:  Negative for dizziness, syncope and headaches.   Hematological:  Negative for adenopathy. Does not  bruise/bleed easily.   Psychiatric/Behavioral:  Positive for sleep disturbance.          Objective:     Vitals:    11/25/24 1048   BP: (!) 170/90   Pulse: 92       Past Medical History:   Diagnosis Date    Arthritis     Back pain      Past Surgical History:   Procedure Laterality Date    FOOT SURGERY      JOINT REPLACEMENT  4/18/2012    left hip    SINUS SURGERY            Physical Exam   Eyes: Right conjunctiva is not injected. Left conjunctiva is not injected.   Cardiovascular: Normal rate and regular rhythm. Exam reveals no decreased pulses.   Pulmonary/Chest: Effort normal.   Neurological: Gait normal.   Skin: Rash (hyperpigmentation on elbows) noted.   Psychiatric: Mood and affect normal.       Right Side Rheumatological Exam     Examination finds the 1st MCP, 2nd MCP, 3rd MCP, 4th MCP and 5th MCP normal.    The patient is tender to palpation of the 1st PIP, 2nd PIP, 3rd PIP, 4th PIP and 5th PIP    The patient has an enlarged 1st PIP, 2nd PIP, 3rd PIP, 4th PIP and 5th PIP    Left Side Rheumatological Exam     Examination finds the 1st MCP, 2nd MCP, 3rd MCP, 4th MCP and 5th MCP normal.    The patient is tender to palpation of the 1st PIP, 2nd PIP, 3rd PIP, 4th PIP and 5th PIP.    The patient has an enlarged 1st PIP, 2nd PIP, 3rd PIP, 4th PIP and 5th PIP.         Labs:  Component      Latest Ref Rng 9/30/2024   Glucose      65 - 99 mg/dL 96    BUN      7 - 25 mg/dL 16    Creatinine      0.70 - 1.35 mg/dL 1.06    eGFR      > OR = 60 mL/min/1.73m2 77    BUN/CREAT RATIO      6 - 22 (calc) SEE NOTE:    Sodium      135 - 146 mmol/L 138    Potassium      3.5 - 5.3 mmol/L 4.2    Chloride      98 - 110 mmol/L 102    CO2      20 - 32 mmol/L 29    Calcium      8.6 - 10.3 mg/dL 9.4    TSH      0.40 - 4.50 mIU/L 1.65        Assessment and Plan:     1. PSA (psoriatic arthritis) (Primary)  Start Skyrizi 150 mg wk 0, wk 4 then every 12 weeks. Discussed s/e and risk of infection with immune suppression. Check hep B, C, TB in  anticipation of starting treatment.   Continue celebrex 200 mg daily    - CBC Auto Differential  - C-Reactive Protein  - Sedimentation Rate  - Hepatitis B Surface Antigen  - Hepatitis B Core Antibody, Total  - Hepatitis C Antibody  - Quantiferon Gold TB    Injections given today in clinic:  - ketorolac injection 60 mg  - methylPREDNISolone acetate injection 160 mg  - cyanocobalamin injection 1,000 mcg    - risankizumab-rzaa (SKYRIZI) 150 mg/mL PnIj; Inject 150 mg into the skin every 28 days.  Dispense: 1 mL; Refill: 1  - risankizumab-rzaa (SKYRIZI) 150 mg/mL PnIj; Inject 150 mg into the skin every 12 weeks.  Dispense: 1 mL; Refill: 4    2. Lumbar spondylosis  Cont lyrica 75 mg bid and continue hydrocodone    3. Primary insomnia  Cont lunesta per MD    4. Chronic pain syndrome  Continue current medications. Will monitor for efficacy and s/e.    Follow up:  4 mo Dr. Sanford

## 2024-12-03 RX ORDER — HYDROCODONE BITARTRATE AND ACETAMINOPHEN 10; 325 MG/1; MG/1
TABLET ORAL
Qty: 120 TABLET | Refills: 0 | Status: SHIPPED | OUTPATIENT
Start: 2024-12-03

## 2024-12-03 RX ORDER — HYDROCODONE BITARTRATE AND ACETAMINOPHEN 10; 325 MG/1; MG/1
TABLET ORAL
Qty: 120 TABLET | Refills: 0 | Status: SHIPPED | OUTPATIENT
Start: 2024-12-26

## 2024-12-03 RX ORDER — HYDROCODONE BITARTRATE AND ACETAMINOPHEN 10; 325 MG/1; MG/1
TABLET ORAL
Qty: 120 TABLET | Refills: 0 | Status: SHIPPED | OUTPATIENT
Start: 2025-01-25

## 2024-12-03 RX ORDER — ESZOPICLONE 3 MG/1
TABLET, FILM COATED ORAL
Qty: 30 TABLET | Refills: 4 | Status: SHIPPED | OUTPATIENT
Start: 2024-12-03

## 2024-12-03 RX ORDER — PREGABALIN 75 MG/1
75 CAPSULE ORAL 2 TIMES DAILY
Qty: 60 CAPSULE | Refills: 6 | Status: SHIPPED | OUTPATIENT
Start: 2024-12-03 | End: 2025-06-03

## 2024-12-12 LAB
BASOPHILS # BLD AUTO: 9 CELLS/UL (ref 0–200)
BASOPHILS NFR BLD AUTO: 0.2 %
CRP SERPL-MCNC: <3 MG/L
EOSINOPHIL # BLD AUTO: 60 CELLS/UL (ref 15–500)
EOSINOPHIL NFR BLD AUTO: 1.3 %
ERYTHROCYTE [DISTWIDTH] IN BLOOD BY AUTOMATED COUNT: 13.7 % (ref 11–15)
ERYTHROCYTE [SEDIMENTATION RATE] IN BLOOD BY WESTERGREN METHOD: 2 MM/H
GAMMA INTERFERON BACKGROUND BLD IA-ACNC: 0.04 IU/ML
HBV CORE AB SERPL QL IA: NORMAL
HBV SURFACE AG SERPL QL IA: NORMAL
HCT VFR BLD AUTO: 49 % (ref 38.5–50)
HCV AB SERPL QL IA: NORMAL
HGB BLD-MCNC: 16.4 G/DL (ref 13.2–17.1)
LYMPHOCYTES # BLD AUTO: 2130 CELLS/UL (ref 850–3900)
LYMPHOCYTES NFR BLD AUTO: 46.3 %
M TB IFN-G BLD-IMP: NEGATIVE
M TB IFN-G CD4+ BCKGRND COR BLD-ACNC: 0.02 IU/ML
M TB IFN-G CD4+CD8+ BCKGRND COR BLD-ACNC: 0.03 IU/ML
MCH RBC QN AUTO: 31.2 PG (ref 27–33)
MCHC RBC AUTO-ENTMCNC: 33.5 G/DL (ref 32–36)
MCV RBC AUTO: 93.3 FL (ref 80–100)
MITOGEN IGNF BCKGRD COR BLD-ACNC: 9.54 IU/ML
MONOCYTES # BLD AUTO: 419 CELLS/UL (ref 200–950)
MONOCYTES NFR BLD AUTO: 9.1 %
NEUTROPHILS # BLD AUTO: 1983 CELLS/UL (ref 1500–7800)
NEUTROPHILS NFR BLD AUTO: 43.1 %
PLATELET # BLD AUTO: 241 THOUSAND/UL (ref 140–400)
PMV BLD REES-ECKER: 9.2 FL (ref 7.5–12.5)
RBC # BLD AUTO: 5.25 MILLION/UL (ref 4.2–5.8)
WBC # BLD AUTO: 4.6 THOUSAND/UL (ref 3.8–10.8)

## 2025-02-06 ENCOUNTER — TELEPHONE (OUTPATIENT)
Dept: RHEUMATOLOGY | Facility: CLINIC | Age: 70
End: 2025-02-06
Payer: MEDICARE

## 2025-02-07 ENCOUNTER — OFFICE VISIT (OUTPATIENT)
Dept: PHYSICAL MEDICINE AND REHAB | Facility: CLINIC | Age: 70
End: 2025-02-07
Payer: MEDICARE

## 2025-02-07 VITALS
WEIGHT: 212.88 LBS | BODY MASS INDEX: 26.6 KG/M2 | HEART RATE: 107 BPM | SYSTOLIC BLOOD PRESSURE: 150 MMHG | DIASTOLIC BLOOD PRESSURE: 108 MMHG

## 2025-02-07 DIAGNOSIS — M75.41 SHOULDER IMPINGEMENT SYNDROME, RIGHT: ICD-10-CM

## 2025-02-07 DIAGNOSIS — M17.0 PRIMARY OSTEOARTHRITIS OF BOTH KNEES: Primary | ICD-10-CM

## 2025-02-07 DIAGNOSIS — M75.42 SHOULDER IMPINGEMENT SYNDROME, LEFT: ICD-10-CM

## 2025-02-07 PROCEDURE — 1125F AMNT PAIN NOTED PAIN PRSNT: CPT | Mod: CPTII,S$GLB,, | Performed by: STUDENT IN AN ORGANIZED HEALTH CARE EDUCATION/TRAINING PROGRAM

## 2025-02-07 PROCEDURE — 3288F FALL RISK ASSESSMENT DOCD: CPT | Mod: CPTII,S$GLB,, | Performed by: STUDENT IN AN ORGANIZED HEALTH CARE EDUCATION/TRAINING PROGRAM

## 2025-02-07 PROCEDURE — 20610 DRAIN/INJ JOINT/BURSA W/O US: CPT | Mod: 50,S$GLB,, | Performed by: STUDENT IN AN ORGANIZED HEALTH CARE EDUCATION/TRAINING PROGRAM

## 2025-02-07 PROCEDURE — 99999 PR PBB SHADOW E&M-EST. PATIENT-LVL IV: CPT | Mod: PBBFAC,,, | Performed by: STUDENT IN AN ORGANIZED HEALTH CARE EDUCATION/TRAINING PROGRAM

## 2025-02-07 PROCEDURE — 1101F PT FALLS ASSESS-DOCD LE1/YR: CPT | Mod: CPTII,S$GLB,, | Performed by: STUDENT IN AN ORGANIZED HEALTH CARE EDUCATION/TRAINING PROGRAM

## 2025-02-07 PROCEDURE — 3080F DIAST BP >= 90 MM HG: CPT | Mod: CPTII,S$GLB,, | Performed by: STUDENT IN AN ORGANIZED HEALTH CARE EDUCATION/TRAINING PROGRAM

## 2025-02-07 PROCEDURE — 99214 OFFICE O/P EST MOD 30 MIN: CPT | Mod: 25,S$GLB,, | Performed by: STUDENT IN AN ORGANIZED HEALTH CARE EDUCATION/TRAINING PROGRAM

## 2025-02-07 PROCEDURE — 3077F SYST BP >= 140 MM HG: CPT | Mod: CPTII,S$GLB,, | Performed by: STUDENT IN AN ORGANIZED HEALTH CARE EDUCATION/TRAINING PROGRAM

## 2025-02-07 PROCEDURE — 1159F MED LIST DOCD IN RCRD: CPT | Mod: CPTII,S$GLB,, | Performed by: STUDENT IN AN ORGANIZED HEALTH CARE EDUCATION/TRAINING PROGRAM

## 2025-02-07 PROCEDURE — 3008F BODY MASS INDEX DOCD: CPT | Mod: CPTII,S$GLB,, | Performed by: STUDENT IN AN ORGANIZED HEALTH CARE EDUCATION/TRAINING PROGRAM

## 2025-02-07 RX ORDER — METHYLPREDNISOLONE ACETATE 40 MG/ML
40 INJECTION, SUSPENSION INTRA-ARTICULAR; INTRALESIONAL; INTRAMUSCULAR; SOFT TISSUE
Status: DISCONTINUED | OUTPATIENT
Start: 2025-02-07 | End: 2025-02-07 | Stop reason: HOSPADM

## 2025-02-07 RX ADMIN — METHYLPREDNISOLONE ACETATE 40 MG: 40 INJECTION, SUSPENSION INTRA-ARTICULAR; INTRALESIONAL; INTRAMUSCULAR; SOFT TISSUE at 09:02

## 2025-02-07 NOTE — ASSESSMENT & PLAN NOTE
Continue home exercise program  Right subacromial bursa injection with 40 mg of Depo-Medrol and 2 cc of 1% lidocaine.  See procedure note for details.    Return to clinic after March 12th, 2025 for knee injections with Durolane

## 2025-02-07 NOTE — PROGRESS NOTES
PHYSICAL MEDICINE AND REHABILITATION  Follow up visit:    Subjective:   Chief Complaint:    Bilateral knee pain and bilateral shoulder pain    Interval note on 02/07/2025:  Patient arrives today for scheduled follow up. Underwent bilateral durolane injections on 9/12/24 with good relief of his pain. Left SAB on 8/30/24 with good relief of his pain. Requesting injections into both shoulders and both knees today.  He is only 5 months from his previous Durolane injections therefore we should wait on this for now.  His past injections have provided him with significant relief..     HPI: She was sent to me for consultation for bilateral knee pain.  He also would like to discuss his bilateral shoulder pain as well as some bruising in his right anterior thigh.      For the shoulders, this has been a chronic issue.  Denies any associated trauma.  He has seen orthopedics in the past who have discussed surgery with him.  He has also had steroid injections in the past which do provided proximally 30 days of pain relief.  He notes a bone spur in the left shoulder.  He also notes some atrophy of the left superior shoulder along the superior scapula.  He describes the pain as sharp, aching and pressure.  Worse with using his shoulder.  He has a history of 2 cervical spine surgeries: The most recent was in November of 2023 and the 2nd was 2-3 years prior to that.  X-rays of the right shoulder from 03/06/2023 show mild AC joint hypertrophy and no significant glenohumeral joint where as well as age-appropriate subacromial spurring.  X-rays of the left shoulder from 07/17/2023 show moderate AC joint arthropathy as well as moderate glenohumeral osteoarthritis.  A note from Dr. Joey Kim on 05/28/2024 shows a left subacromial bursa injection with 60 mg of Kenalog.    For the knees, this is a chronic issue.  He recalls having steroid injections in the past without significant relief.  He has seen orthopedics in the past for  the right knee and was diagnosed with a degenerative right knee condition.  X-rays of the right knee from 04/22/2024 show moderate knee degenerative changes.  He was given an intra-articular injection with 60 mg of Kenalog at his previous visit with Dr. Joey Ghosh.     For the right thigh, this started as a very strong spasms.  He reports associated bruising in the distal thigh.  The spasms have not occurred since.  He does have associated tenderness in the area where the bruising is currently present.  Symptoms have been improving.    Review of Systems  Weakness and joint swelling    Imaging/Diagnostic Studies  XR SHOULDER COMPLETE 2 OR MORE VIEWS RIGHT  Order: [id number]  Narrative    Final Impression    4 views right shoulder: No evidence of acute bony finding.  No obvious  sign of fracture or dislocation.  Mild acromioclavicular joint  hypertrophy.  No significant glenohumeral joint wear, age-appropriate.    Subacromial spurring    Joey Ghosh MD 3/6/2023 12:13 PM  ====    XR SHOULDER 2+ VIEW LEFT    History of left shoulder pain. 4 views.    There is moderate AC joint arthropathy. There is moderate glenohumeral osteoarthritis.  No acute abnormality noted.  Exam End: 07/17/23 08:34 Last Resulted: 07/17/23 08:52     ===  Exam:  XR KNEE 3 VIEW RIGHT  Date of Exam:  4/22/2024 10:29 AM  Clinical Indication: , pain  Comparison:  None.  Technique:  XR KNEE 3 VIEW RIGHT  Findings:  No acute fracture or malalignment. No joint effusion.  Moderate knee degenerative changes.  Impression:  Moderately degenerative changes.  Exam End: 04/22/24 10:55 Last Resulted: 04/22/24 14:36       Past Medical History:   Diagnosis Date    Arthritis     Back pain        Past Surgical History:   Procedure Laterality Date    FOOT SURGERY      JOINT REPLACEMENT  4/18/2012    left hip    SINUS SURGERY         Review of patient's allergies indicates:   Allergen Reactions    Lisinopril Other (See Comments)    Penicillins Nausea And  Vomiting and Other (See Comments)    Ampicillin     Hydrocodone     Penicillin        Current Outpatient Medications   Medication Sig Dispense Refill    aluminum & magnesium hydroxide-simethicone (MYLANTA MAX STRENGTH) 400-400-40 mg/5 mL suspension Take 5 mLs by mouth every 6 (six) hours as needed for Indigestion.      anastrozole (ARIMIDEX) 1 mg Tab Take 1 mg by mouth.      calcipotriene (DOVONOX) 0.005 % cream Apply topically 2 (two) times daily. 120 g 3    celecoxib (CELEBREX) 200 MG capsule Take 1 capsule (200 mg total) by mouth once daily. 30 capsule 6    cetirizine-pseudoephedrine 5-120 mg Tb12 1 tablet      clindamycin (CLEOCIN T) 1 % external solution Apply topically 2 (two) times daily. 60 mL 5    clobetasol 0.05% (TEMOVATE) 0.05 % Oint Apply topically 2 (two) times daily. 45 g 4    cyclobenzaprine (FLEXERIL) 10 MG tablet Take 10 mg by mouth 3 (three) times daily.      dorzolamide (TRUSOPT) 2 % ophthalmic solution Place 1 drop into both eyes 2 (two) times daily.      eszopiclone (LUNESTA) 3 mg Tab TAKE 1 TABLET BY MOUTH NIGHTLY AS NEEDED 30 tablet 4    famotidine (PEPCID) 40 MG tablet 90 tablets.      fluticasone propionate (FLONASE) 50 mcg/actuation nasal spray fluticasone propionate 50 mcg/actuation nasal spray,suspension      HYDROcodone-acetaminophen (NORCO)  mg per tablet Take 1 tablet by mouth every 6 (six) hours as needed for Pain. 120 tablet 0    HYDROcodone-acetaminophen (NORCO)  mg per tablet Take 1 tablet by mouth every 6 (six) hours as needed for Pain. 120 tablet 0    hydrocortisone 2.5 % ointment APPLY A SMALL AMOUNT 2 TIMES DAILY      montelukast (SINGULAIR) 10 mg tablet Take 10 mg by mouth.      omeprazole (PRILOSEC) 40 MG capsule Take 40 mg by mouth every morning.       ondansetron (ZOFRAN-ODT) 8 MG TbDL Take 8 mg by mouth every 6 (six) hours as needed.      pantoprazole (PROTONIX) 40 MG tablet Take 1 tablet (40 mg total) by mouth daily      polyethylene glycol (GLYCOLAX) 17  gram/dose powder Take 17 g by mouth once daily.      pregabalin (LYRICA) 75 MG capsule Take 1 capsule (75 mg total) by mouth 2 (two) times daily. 60 capsule 6    risankizumab-rzaa (SKYRIZI) 150 mg/mL PnIj Inject 150 mg into the skin every 28 days. 1 mL 1    risankizumab-rzaa (SKYRIZI) 150 mg/mL PnIj Inject 150 mg into the skin every 12 weeks. 1 mL 4    sodium chloride (OCEAN) 0.65 % nasal spray 2 drops in each nostril as needed      tadalafiL (CIALIS) 5 MG tablet Take 5 mg by mouth once daily.      triamcinolone acetonide 0.1% (KENALOG) 0.1 % ointment Apply topically 2 (two) times daily. 80 g 3     No current facility-administered medications for this visit.       Family History   Problem Relation Name Age of Onset    Anesthesia problems Neg Hx      Clotting disorder Neg Hx         Social History     Socioeconomic History    Marital status:    Tobacco Use    Smoking status: Never    Smokeless tobacco: Current    Tobacco comments:     occassional dip   Substance and Sexual Activity    Alcohol use: No    Drug use: No     Social Drivers of Health     Financial Resource Strain: Low Risk  (2/1/2025)    Overall Financial Resource Strain (CARDIA)     Difficulty of Paying Living Expenses: Not hard at all   Food Insecurity: No Food Insecurity (2/1/2025)    Hunger Vital Sign     Worried About Running Out of Food in the Last Year: Never true     Ran Out of Food in the Last Year: Never true   Physical Activity: Insufficiently Active (2/1/2025)    Exercise Vital Sign     Days of Exercise per Week: 3 days     Minutes of Exercise per Session: 20 min   Stress: No Stress Concern Present (2/1/2025)    [employer] - Occupational Stress Questionnaire     Feeling of Stress : Not at all   Housing Stability: Unknown (2/1/2025)    Housing Stability Vital Sign     Unable to Pay for Housing in the Last Year: No         Objective:    BP (!) 150/108 (BP Location: Right arm, Patient Position: Sitting)    Pulse 107   Wt 96.6 kg (212 lb 13.7 oz)   BMI 26.60 kg/m²   Physical Exam  Constitutional:       Appearance: Normal appearance.   HENT:      Head: Normocephalic and atraumatic.   Eyes:      Extraocular Movements: Extraocular movements intact.   Cardiovascular:      Rate and Rhythm: Normal rate.   Pulmonary:      Effort: Pulmonary effort is normal.   Abdominal:      General: Abdomen is flat.   Musculoskeletal:         General: Normal range of motion.      Right knee: No effusion.      Left knee: No effusion.   Skin:     General: Skin is warm and dry.   Neurological:      General: No focal deficit present.      Mental Status: He is alert and oriented to person, place, and time. Mental status is at baseline.   Psychiatric:         Mood and Affect: Mood normal.         Behavior: Behavior normal.         Thought Content: Thought content normal.        Right Knee Exam     Tenderness   The patient is experiencing tenderness in the lateral joint line and medial joint line.    Range of Motion   Extension:  normal     Other   Erythema: absent  Scars: absent  Sensation: normal  Pulse: present  Swelling: none  Effusion: no effusion present      Left Knee Exam     Tenderness   The patient is experiencing tenderness in the lateral joint line and medial joint line.    Range of Motion   Extension:  normal     Other   Erythema: absent  Scars: absent  Sensation: normal  Pulse: present  Swelling: none  Effusion: no effusion present      Right Shoulder Exam     Muscle Strength   The patient has normal right shoulder strength.  Abduction: 4/5   Internal rotation: 5/5   External rotation: 5/5   Supraspinatus: 4/5   Subscapularis: 5/5   Biceps: 4/5     Tests   Maravilla test: positive  Impingement: positive  Sulcus: absent    Other   Erythema: absent  Scars: absent  Sensation: normal  Pulse: present    Comments:  + jobes  Negative Baxter's      Left Shoulder Exam     Muscle Strength   Abduction: 4/5   Internal rotation: 5/5   External  rotation: 5/5   Supraspinatus: 4/5   Subscapularis: 5/5   Biceps: 5/5     Tests   Maravilla test: positive  Impingement: positive  Sulcus: present    Other   Erythema: absent  Scars: present  Sensation: normal  Pulse: present     Comments:  + jobes  Mildly positive Waverly's               Assessment:       ICD-10-CM ICD-9-CM    1. Primary osteoarthritis of both knees  M17.0 715.16 Prior authorization Order      2. Shoulder impingement syndrome, right  M75.41 726.2 Large Joint Aspiration/Injection: bilateral subacromial bursa      3. Shoulder impingement syndrome, left  M75.42 726.2 Large Joint Aspiration/Injection: bilateral subacromial bursa              Plan:   1. Primary osteoarthritis of both knees  Assessment & Plan:  Prior authorization resubmitted for bilateral Durolane injections as he has had excellent relief with these in the past.  Kellgren Nick score of 3  Return to clinic after March 12th for bilateral durolane injections.       Orders:  -     Prior authorization Order    2. Shoulder impingement syndrome, right  Assessment & Plan:  Continue home exercise program  Right subacromial bursa injection with 40 mg of Depo-Medrol and 2 cc of 1% lidocaine.  See procedure note for details.    Return to clinic after March 12th, 2025 for knee injections with Durolane       Orders:  -     Large Joint Aspiration/Injection: bilateral subacromial bursa    3. Shoulder impingement syndrome, left  Assessment & Plan:  Continue home exercise program  Repeat left subacromial bursa injection with 40 mg of Depo-Medrol and 2 cc of 1% lidocaine.  See procedure note for details  If insufficient relief with today's injection, consider a left glenohumeral joint injection at next visit.    Orders:  -     Large Joint Aspiration/Injection: bilateral subacromial bursa      Beny Archibald MD  Physical Medicine & Rehabilitation     Disclaimer:  This note may have been prepared using voice recognition software, it may have not  been extensively proofed, as such there could be errors within the text such as sound alike errors.  Contact the author of this note for clarification.

## 2025-02-07 NOTE — ASSESSMENT & PLAN NOTE
Continue home exercise program  Repeat left subacromial bursa injection with 40 mg of Depo-Medrol and 2 cc of 1% lidocaine.  See procedure note for details  If insufficient relief with today's injection, consider a left glenohumeral joint injection at next visit.

## 2025-02-07 NOTE — ASSESSMENT & PLAN NOTE
Prior authorization resubmitted for bilateral Durolane injections as he has had excellent relief with these in the past.  Kellgren Nick score of 3  Return to clinic after March 12th for bilateral durolane injections.

## 2025-02-07 NOTE — PROCEDURES
Large Joint Aspiration/Injection: bilateral subacromial bursa    Date/Time: 2/7/2025 9:00 AM    Performed by: Beny Archibald MD  Authorized by: Beny Archibald MD    Consent Done?:  Yes (Verbal)  Indications:  Arthritis, diagnostic evaluation and pain  Site marked: the procedure site was marked    Timeout: prior to procedure the correct patient, procedure, and site was verified    Prep: patient was prepped and draped in usual sterile fashion    Local anesthesia used?: No      Details:  Needle Size:  25 G  Ultrasonic Guidance for needle placement?: No    Approach:  Posterior  Location:  Shoulder  Laterality:  Bilateral  Site:  Bilateral subacromial bursa  Medications (Right):  40 mg methylPREDNISolone acetate 40 mg/mL  Medications (Left):  40 mg methylPREDNISolone acetate 40 mg/mL  Patient tolerance:  Patient tolerated the procedure well with no immediate complications

## 2025-03-10 ENCOUNTER — OFFICE VISIT (OUTPATIENT)
Dept: RHEUMATOLOGY | Facility: CLINIC | Age: 70
End: 2025-03-10
Payer: MEDICARE

## 2025-03-10 VITALS
SYSTOLIC BLOOD PRESSURE: 152 MMHG | HEIGHT: 75 IN | BODY MASS INDEX: 27.17 KG/M2 | HEART RATE: 67 BPM | DIASTOLIC BLOOD PRESSURE: 72 MMHG | WEIGHT: 218.5 LBS

## 2025-03-10 DIAGNOSIS — L40.50 PSA (PSORIATIC ARTHRITIS): ICD-10-CM

## 2025-03-10 DIAGNOSIS — F51.01 PRIMARY INSOMNIA: ICD-10-CM

## 2025-03-10 DIAGNOSIS — Z79.899 HIGH RISK MEDICATION USE: ICD-10-CM

## 2025-03-10 DIAGNOSIS — G89.4 CHRONIC PAIN SYNDROME: ICD-10-CM

## 2025-03-10 DIAGNOSIS — Z51.81 MEDICATION MONITORING ENCOUNTER: ICD-10-CM

## 2025-03-10 DIAGNOSIS — M76.60 ACHILLES TENDINITIS, UNSPECIFIED LATERALITY: ICD-10-CM

## 2025-03-10 DIAGNOSIS — M51.361 DEGENERATION OF INTERVERTEBRAL DISC OF LUMBAR REGION WITH LOWER EXTREMITY PAIN: ICD-10-CM

## 2025-03-10 DIAGNOSIS — D84.9 IMMUNOCOMPROMISED: ICD-10-CM

## 2025-03-10 DIAGNOSIS — Z71.85 IMMUNIZATION COUNSELING: ICD-10-CM

## 2025-03-10 DIAGNOSIS — Z71.89 ENCOUNTER FOR INJECTION EDUCATION: ICD-10-CM

## 2025-03-10 DIAGNOSIS — M54.16 LUMBAR RADICULOPATHY: Primary | ICD-10-CM

## 2025-03-10 DIAGNOSIS — L40.50 PSA (PSORIATIC ARTHRITIS): Primary | ICD-10-CM

## 2025-03-10 PROCEDURE — 3077F SYST BP >= 140 MM HG: CPT | Mod: CPTII,S$GLB,, | Performed by: INTERNAL MEDICINE

## 2025-03-10 PROCEDURE — 99999 PR PBB SHADOW E&M-EST. PATIENT-LVL II: CPT | Mod: PBBFAC,,,

## 2025-03-10 PROCEDURE — 99215 OFFICE O/P EST HI 40 MIN: CPT | Mod: 25,S$GLB,, | Performed by: INTERNAL MEDICINE

## 2025-03-10 PROCEDURE — 1125F AMNT PAIN NOTED PAIN PRSNT: CPT | Mod: CPTII,S$GLB,, | Performed by: INTERNAL MEDICINE

## 2025-03-10 PROCEDURE — 1160F RVW MEDS BY RX/DR IN RCRD: CPT | Mod: CPTII,S$GLB,, | Performed by: INTERNAL MEDICINE

## 2025-03-10 PROCEDURE — 3008F BODY MASS INDEX DOCD: CPT | Mod: CPTII,S$GLB,, | Performed by: INTERNAL MEDICINE

## 2025-03-10 PROCEDURE — 3288F FALL RISK ASSESSMENT DOCD: CPT | Mod: CPTII,S$GLB,, | Performed by: INTERNAL MEDICINE

## 2025-03-10 PROCEDURE — 3078F DIAST BP <80 MM HG: CPT | Mod: CPTII,S$GLB,, | Performed by: INTERNAL MEDICINE

## 2025-03-10 PROCEDURE — 1159F MED LIST DOCD IN RCRD: CPT | Mod: CPTII,S$GLB,, | Performed by: INTERNAL MEDICINE

## 2025-03-10 PROCEDURE — 96372 THER/PROPH/DIAG INJ SC/IM: CPT | Mod: S$GLB,,, | Performed by: INTERNAL MEDICINE

## 2025-03-10 PROCEDURE — 99999 PR PBB SHADOW E&M-EST. PATIENT-LVL IV: CPT | Mod: PBBFAC,,, | Performed by: INTERNAL MEDICINE

## 2025-03-10 PROCEDURE — 1101F PT FALLS ASSESS-DOCD LE1/YR: CPT | Mod: CPTII,S$GLB,, | Performed by: INTERNAL MEDICINE

## 2025-03-10 RX ORDER — HYDROCODONE BITARTRATE AND ACETAMINOPHEN 10; 325 MG/1; MG/1
1 TABLET ORAL EVERY 6 HOURS PRN
Qty: 120 TABLET | Refills: 0 | Status: SHIPPED | OUTPATIENT
Start: 2025-05-07 | End: 2025-06-06

## 2025-03-10 RX ORDER — METHYLPREDNISOLONE ACETATE 80 MG/ML
80 INJECTION, SUSPENSION INTRA-ARTICULAR; INTRALESIONAL; INTRAMUSCULAR; SOFT TISSUE
Status: COMPLETED | OUTPATIENT
Start: 2025-03-10 | End: 2025-03-10

## 2025-03-10 RX ORDER — PREGABALIN 75 MG/1
75 CAPSULE ORAL 2 TIMES DAILY
Qty: 60 CAPSULE | Refills: 6 | Status: SHIPPED | OUTPATIENT
Start: 2025-03-10 | End: 2025-09-08

## 2025-03-10 RX ORDER — METHYLPREDNISOLONE ACETATE 80 MG/ML
160 INJECTION, SUSPENSION INTRA-ARTICULAR; INTRALESIONAL; INTRAMUSCULAR; SOFT TISSUE
Status: COMPLETED | OUTPATIENT
Start: 2025-03-10 | End: 2025-03-10

## 2025-03-10 RX ORDER — HYDROCODONE BITARTRATE AND ACETAMINOPHEN 10; 325 MG/1; MG/1
1 TABLET ORAL EVERY 6 HOURS PRN
Qty: 120 TABLET | Refills: 0 | Status: SHIPPED | OUTPATIENT
Start: 2025-03-10 | End: 2025-04-09

## 2025-03-10 RX ORDER — KETOROLAC TROMETHAMINE 30 MG/ML
60 INJECTION, SOLUTION INTRAMUSCULAR; INTRAVENOUS
Status: COMPLETED | OUTPATIENT
Start: 2025-03-10 | End: 2025-03-10

## 2025-03-10 RX ORDER — ESZOPICLONE 3 MG/1
TABLET, FILM COATED ORAL
Qty: 30 TABLET | Refills: 4 | Status: SHIPPED | OUTPATIENT
Start: 2025-03-10

## 2025-03-10 RX ORDER — CYANOCOBALAMIN 1000 UG/ML
1000 INJECTION, SOLUTION INTRAMUSCULAR; SUBCUTANEOUS
Status: COMPLETED | OUTPATIENT
Start: 2025-03-10 | End: 2025-03-10

## 2025-03-10 RX ORDER — HYDROCODONE BITARTRATE AND ACETAMINOPHEN 10; 325 MG/1; MG/1
1 TABLET ORAL EVERY 6 HOURS PRN
Qty: 120 TABLET | Refills: 0 | Status: SHIPPED | OUTPATIENT
Start: 2025-04-08 | End: 2025-05-08

## 2025-03-10 RX ADMIN — KETOROLAC TROMETHAMINE 60 MG: 30 INJECTION, SOLUTION INTRAMUSCULAR; INTRAVENOUS at 10:03

## 2025-03-10 RX ADMIN — CYANOCOBALAMIN 1000 MCG: 1000 INJECTION, SOLUTION INTRAMUSCULAR; SUBCUTANEOUS at 10:03

## 2025-03-10 RX ADMIN — METHYLPREDNISOLONE ACETATE 80 MG: 80 INJECTION, SUSPENSION INTRA-ARTICULAR; INTRALESIONAL; INTRAMUSCULAR; SOFT TISSUE at 10:03

## 2025-03-10 ASSESSMENT — ROUTINE ASSESSMENT OF PATIENT INDEX DATA (RAPID3)
PSYCHOLOGICAL DISTRESS SCORE: 0
PATIENT GLOBAL ASSESSMENT SCORE: 6.5
MDHAQ FUNCTION SCORE: 0.2
TOTAL RAPID3 SCORE: 5.22
FATIGUE SCORE: 1.1
PAIN SCORE: 8.5

## 2025-03-10 NOTE — PROGRESS NOTES
Time: 37 mins  # of DX: 5    Subjective:     Patient ID:  Tera Marquez    Chief Complaint: Biologic education, training, and observation     Referring Provider: Obdulio Sanford MD    History of Present Illness:  Pt is a 69 y.o. male who presents to the clinic for f/u with Dr. Sanford and Jessica injection training. This is a new patient to me but has been following with Obdulio Sanford MD. Patient is presenting with OA of both knees, PsA, and hx of left total hip arthroplasty.      Rheumatologic History of Tx:   Otezla  SSZ    PsA:  Dispensing [facility]  Reviewed injection instructions; patient feels comfortable injecting at home    Vaccines:  Recommend influenza annually, prevnar 20 if not completed yet, TdaP every 10 years, shingrix x 2, COVID, Hep B (adults 19-58 yo and 60 or older if have risk factors) and RSV if 60 years old or older  Patient is due for COVID, Shingrix (2 doses 2-6 months apart once in lifetime), TdaP (every 10 years), and RSV (once if 60 years old or older)    Influenza: Discuss and recommend annually. 10/2024  PCV 20: 12/2023  PCV 13: 11/2019  PPSV 23: 2/2015  Tetanus (TdaP): Discuss and recommend booster every 10 years. Due now  Shingrix: Discuss and recommend. 2 dose series 2-6 months apart. Due now  Covid: CDC recommends 1 new 3798-9658 dose in immunocompromised patients that has received 2 or more COVID doses  RSV: Discuss and recommend 1 dose if 60 years old or older    Current Medications:  Current Outpatient Medications   Medication Instructions    aluminum & magnesium hydroxide-simethicone (MYLANTA MAX STRENGTH) 400-400-40 mg/5 mL suspension 5 mLs, Every 6 hours PRN    anastrozole (ARIMIDEX) 1 mg    calcipotriene (DOVONOX) 0.005 % cream Topical (Top), 2 times daily    celecoxib (CELEBREX) 200 mg, Oral, Daily    cetirizine-pseudoephedrine 5-120 mg Tb12 1 tablet    clindamycin (CLEOCIN T) 1 % external solution Topical (Top), 2 times daily    clobetasol  0.05% (TEMOVATE) 0.05 % Oint Topical (Top), 2 times daily    cyclobenzaprine (FLEXERIL) 10 mg, 3 times daily    dorzolamide (TRUSOPT) 2 % ophthalmic solution 1 drop, 2 times daily    eszopiclone (LUNESTA) 3 mg Tab TAKE 1 TABLET BY MOUTH NIGHTLY AS NEEDED    famotidine (PEPCID) 40 MG tablet 90 tablets    fluticasone propionate (FLONASE) 50 mcg/actuation nasal spray fluticasone propionate 50 mcg/actuation nasal spray,suspension    HYDROcodone-acetaminophen (NORCO)  mg per tablet Take 1 tablet by mouth every 6 (six) hours as needed for Pain.    HYDROcodone-acetaminophen (NORCO)  mg per tablet Take 1 tablet by mouth every 6 (six) hours as needed for Pain.    hydrocortisone 2.5 % ointment APPLY A SMALL AMOUNT 2 TIMES DAILY    montelukast (SINGULAIR) 10 mg    omeprazole (PRILOSEC) 40 mg, Every morning    ondansetron (ZOFRAN-ODT) 8 mg, Every 6 hours PRN    pantoprazole (PROTONIX) 40 MG tablet Take 1 tablet (40 mg total) by mouth daily    polyethylene glycol (GLYCOLAX) 17 g, Daily    pregabalin (LYRICA) 75 mg, Oral, 2 times daily    SKYRIZI 150 mg, Subcutaneous, Every 28 days    SKYRIZI 150 mg, Subcutaneous, Every 12 weeks    sodium chloride (OCEAN) 0.65 % nasal spray 2 drops in each nostril as needed    tadalafiL (CIALIS) 5 mg, Daily    triamcinolone acetonide 0.1% (KENALOG) 0.1 % ointment Topical (Top), 2 times daily     Objective:     There were no vitals filed for this visit.   BP Readings from Last 4 Encounters:   03/10/25 (!) 152/72   02/07/25 (!) 150/108   11/25/24 (!) 170/90   09/09/24 138/88     There is no height or weight on file to calculate BMI.         2/19/2024    12:12 PM   Rapid3 Question Responses and Scores   MDHAQ Score 0.2   Psychologic Score 1.1   Pain Score 8.5   When you awakened in the morning OVER THE LAST WEEK, did you feel stiff? Yes   If Yes, please indicate the number of hours until you are as limber as you will be for the day 1   Fatigue Score 0.5   Global Health Score 1.5   RAPID3  Score 3.56     Monitoring Lab Results:  Lab Results   Component Value Date    WBC 4.6 12/09/2024    RBC 5.25 12/09/2024    HGB 16.4 12/09/2024    HCT 49.0 12/09/2024    MCV 93.3 12/09/2024    MCH 31.2 12/09/2024    MCHC 33.5 12/09/2024    RDW 13.7 12/09/2024     12/09/2024      Lab Results   Component Value Date     09/30/2024    K 4.2 09/30/2024     09/30/2024    CO2 29 09/30/2024    GLU 96 09/30/2024    BUN 16 09/30/2024    CREATININE 1.06 09/30/2024    CALCIUM 9.4 09/30/2024    PROT 6.5 06/26/2024    ALBUMIN 4.2 06/26/2024    BILITOT 0.5 06/26/2024    ALKPHOS 91 04/10/2020    AST 34 06/26/2024    ALT 36 06/26/2024    EGFRNORACEVR 77 09/30/2024     Lab Results   Component Value Date    SEDRATE 2 12/09/2024    CRP <3.0 12/09/2024      Infectious Disease Screening:  Lab Results   Component Value Date    HEPBSAG NON-REACTIVE 12/09/2024    HEPBCAB NON-REACTIVE 12/09/2024     Lab Results   Component Value Date    HEPCAB NON-REACTIVE 12/09/2024     Lab Results   Component Value Date    QUANTTBGDPL NEGATIVE 12/09/2024     Assessment:     New start Skyrizi for psoriatic arthritis. Patient was referred to the Rheumatology pharmacist for injection education. Pharmacy sent biologic to patient's home and was brought in for injection. Counseled patient on mechanism of action, route of administration, dose and frequency of risankizumab (SKYRIZI). Discussed side effect profile, risks and benefits of Skyrizi. Demonstrated proper injection technique, discussed step by step instructions. Explained importance of vaccines considering the increased risk of infections. Encouraged patient to practice proper hand hygiene considering increased risk of infection with biologics and to avoid raw seafood/live vaccines. Reviewed needed labs to ensure medication is being used safely.      Plan:      Encounter Diagnoses   Name Primary?    PSA (psoriatic arthritis) Yes    Immunocompromised     Immunization counseling      Encounter for injection education     Medication monitoring encounter      1. Injection Training/Education  Provided patient with resources and educational material on Skyrizi  Patient verbalized understanding instructions and was able to demonstrate proper technique using teach back method  Patient self-administered first dose in clinic.  Checked injection site after 20 mins and no reaction observed. Patient left office in stable condition.   Patient to observe injection site for the next few days and call the office if they notice any injection site issues or side effects   ND: 4/7/2025 then start maintenance dosing on 6/30/2025  Patient to make us aware if they ever experiences s/sx of an infection including fever, chills, URI symptoms or UTI symptoms.    2. Health Maintenance  Vaccines needed: 1 new COVID dose, Shingles, RSV, and TdaP    Rx sent to pharmacy for RSV vaccine  Patient agreed to complete vaccines   Advised pt to wait at least 7 days before getting any immunizations after receiving first dose of current biologic    Follow-up scheduled on 10/20/2025 with MD Denise Andrade, PharmD, BCPS  Rheumatology Clinical Pharmacist  [facility]

## 2025-03-10 NOTE — PROGRESS NOTES
Subjective:     Patient ID:  Tera Marquez    Chief Complaint:  Disease Management     History of Present Illness:  Pt is a 69 y.o. male Mr. Marquez is a 69 year old male who presents to clinic for follow up on psoriatic arthritis and osteoarthritis. Today starts with skyrizi.. He tried once daily dosing, but still has side effects. He did not feel this helped much with his joint pain or skin rash. He continues to have pain in his hands, shoulders, hips, and knees. He is followed by [facility] for shoulder injections prn. He restarted lyrica and he is tolerating this.      He is s/p cervical spine surgery 11/23 with Dr. Ramirez, which was successful. He may need additional lumbar spine surgery in the future.     He reports generalized stiffness throughout his body. He is very active with biking, walking, and doing push ups daily.      Psoriasis on his elbow is unchanged.     He is taking Lunesta as needed for insomnia without side effects.  He is taking Norco on rare occasion for severe pain.      .     Current treatment:  1. Celebrex  2. Norco     Prior treatment:   1. Ssz  2. otezla          Rheumatologic History:   - Diagnosis/es:  - Positive serologies:  - Infectious screening labs:  - Previous Treatments:  - Current Treatments:     Interval History:   Hospitalization since last office visit: No    Patient Active Problem List    Diagnosis Date Noted    Shoulder impingement syndrome, left 09/04/2024    Shoulder impingement syndrome, right 09/04/2024    Primary osteoarthritis of both knees 09/04/2024    Drug-induced immunodeficiency 07/01/2024    PSA (psoriatic arthritis) 02/29/2024    DDD (degenerative disc disease), lumbar 02/01/2013    Lumbar spondylosis 02/01/2013    Lumbar radiculopathy 12/21/2012    YUMIKO (left total hip arthroplasty)-4/19/12 04/27/2012     Past Surgical History:   Procedure Laterality Date    FOOT SURGERY      JOINT REPLACEMENT  4/18/2012    left hip    SINUS SURGERY       Social History[1]  Family  History   Problem Relation Name Age of Onset    Anesthesia problems Neg Hx      Clotting disorder Neg Hx       Review of patient's allergies indicates:   Allergen Reactions    Lisinopril Other (See Comments)    Penicillins Nausea And Vomiting and Other (See Comments)    Ampicillin     Hydrocodone     Penicillin        Review of Systems   Review of Systems     Current Medications:  Current Outpatient Medications   Medication Instructions    aluminum & magnesium hydroxide-simethicone (MYLANTA MAX STRENGTH) 400-400-40 mg/5 mL suspension 5 mLs, Every 6 hours PRN    anastrozole (ARIMIDEX) 1 mg    calcipotriene (DOVONOX) 0.005 % cream Topical (Top), 2 times daily    celecoxib (CELEBREX) 200 mg, Oral, Daily    cetirizine-pseudoephedrine 5-120 mg Tb12 1 tablet    clindamycin (CLEOCIN T) 1 % external solution Topical (Top), 2 times daily    clobetasol 0.05% (TEMOVATE) 0.05 % Oint Topical (Top), 2 times daily    cyclobenzaprine (FLEXERIL) 10 mg, 3 times daily    dorzolamide (TRUSOPT) 2 % ophthalmic solution 1 drop, 2 times daily    eszopiclone (LUNESTA) 3 mg Tab TAKE 1 TABLET BY MOUTH NIGHTLY AS NEEDED    famotidine (PEPCID) 40 MG tablet 90 tablets    fluticasone propionate (FLONASE) 50 mcg/actuation nasal spray fluticasone propionate 50 mcg/actuation nasal spray,suspension    HYDROcodone-acetaminophen (NORCO)  mg per tablet Take 1 tablet by mouth every 6 (six) hours as needed for Pain.    HYDROcodone-acetaminophen (NORCO)  mg per tablet Take 1 tablet by mouth every 6 (six) hours as needed for Pain.    hydrocortisone 2.5 % ointment APPLY A SMALL AMOUNT 2 TIMES DAILY    montelukast (SINGULAIR) 10 mg    omeprazole (PRILOSEC) 40 mg, Every morning    ondansetron (ZOFRAN-ODT) 8 mg, Every 6 hours PRN    pantoprazole (PROTONIX) 40 MG tablet Take 1 tablet (40 mg total) by mouth daily    polyethylene glycol (GLYCOLAX) 17 g, Daily    pregabalin (LYRICA) 75 mg, Oral, 2 times daily    SKYRIZI 150 mg, Subcutaneous, Every 28  "days    SKYRIZI 150 mg, Subcutaneous, Every 12 weeks    sodium chloride (OCEAN) 0.65 % nasal spray 2 drops in each nostril as needed    tadalafiL (CIALIS) 5 mg, Daily    triamcinolone acetonide 0.1% (KENALOG) 0.1 % ointment Topical (Top), 2 times daily         Objective:     Vitals:    03/10/25 0846   BP: (!) 152/72   Pulse: 67   Weight: 99.1 kg (218 lb 7.6 oz)   Height: 6' 3" (1.905 m)   PainSc:   8   PainLoc: Generalized      Body mass index is 27.31 kg/m².     Physical Examinations:  Physical Exam   Constitutional: He is oriented to person, place, and time.   HENT:   Head: Normocephalic and atraumatic.   Mouth/Throat: Oropharynx is clear and moist.   Eyes: Pupils are equal, round, and reactive to light.   Neck: No thyromegaly present.   Cardiovascular: Normal rate, regular rhythm and normal heart sounds. Exam reveals no gallop and no friction rub.   No murmur heard.  Pulmonary/Chest: Breath sounds normal. He has no wheezes. He has no rales. He exhibits no tenderness.   Abdominal: There is no abdominal tenderness. There is no rebound and no guarding.   Musculoskeletal:         General: Tenderness present.      Right shoulder: Tenderness present.      Left shoulder: Tenderness present.      Right elbow: Swelling present. Tenderness present.      Left elbow: Tenderness present.      Right wrist: Swelling and tenderness present.      Left wrist: Swelling and tenderness present.      Cervical back: Neck supple.      Right knee: Swelling and effusion present. Tenderness present.      Left knee: Swelling and effusion present. Tenderness present.   Lymphadenopathy:     He has no cervical adenopathy.   Neurological: He is alert and oriented to person, place, and time. Gait normal.   Skin: Bruising noted. No rash noted. There is erythema. No pallor.   Psychiatric: Mood and affect normal.   Vitals reviewed.      Right Side Rheumatological Exam     The patient is tender to palpation of the shoulder, elbow, wrist, knee, 1st " PIP, 1st MCP, 2nd PIP, 2nd MCP, 3rd PIP, 3rd MCP, 4th PIP, 4th MCP and 5th PIP    He has swelling of the elbow, wrist, knee, 1st PIP, 1st MCP, 2nd PIP, 2nd MCP, 3rd PIP, 3rd MCP, 4th PIP, 4th MCP, 5th PIP and 5th MCP    The patient has an enlarged wrist and knee    Shoulder Exam   Tenderness Location: no tenderness    Range of Motion   Active abduction:  abnormal   Adduction: abnormal  Sensation: normal    Knee Exam   Tenderness Location: medial joint line and LCL  Patellofemoral Crepitus: positive  Effusion: positive  Sensation: normal    Hip Exam   Tenderness Location: posterior and anterior  Sensation: normal    Elbow/Wrist Exam   Tenderness Location: no tenderness  Sensation: normal    Left Side Rheumatological Exam     The patient is tender to palpation of the shoulder, elbow, wrist, knee, 1st PIP, 1st MCP, 2nd PIP, 2nd MCP, 3rd PIP, 3rd MCP, 4th PIP, 4th MCP, 5th PIP and 5th MCP.    He has swelling of the wrist, knee, 1st PIP, 1st MCP, 2nd PIP, 2nd MCP, 3rd PIP, 3rd MCP, 4th PIP, 4th MCP, 5th PIP and 5th MCP    The patient has an enlarged knee.    Shoulder Exam   Tenderness Location: no tenderness    Range of Motion   Active abduction:  abnormal   Sensation: normal    Knee Exam   Tenderness Location: lateral joint line and medial joint line    Patellofemoral Crepitus: positive  Effusion: positive  Sensation: normal    Hip Exam   Tenderness Location: posterior and anterior  Sensation: normal    Elbow/Wrist Exam   Sensation: normal      Back/Neck Exam   General Inspection   Gait: normal       Tenderness Right paramedian tenderness of the Upper C-Spine, Lower C-Spine, Lower L-Spine and SI Joint.Left paramedian tenderness of the Upper C-Spine, Lower L-Spine, Lower C-Spine and SI Joint.    Neck Range of Motion   Flexion:  Limited  Extension:  Limited       Disease Assessment Scores:  Patient's Global Assessment of arthritis (0-10): 1  Physician's Global Assessment of arthritis (0-10): 1  Number of Tender Joints  "(0-28): 1  Number of Swollen Joints (0-28): 1        2/19/2024    12:12 PM   Rapid3 Question Responses and Scores   MDHAQ Score 0.2   Psychologic Score 1.1   Pain Score 8.5   When you awakened in the morning OVER THE LAST WEEK, did you feel stiff? Yes   If Yes, please indicate the number of hours until you are as limber as you will be for the day 1   Fatigue Score 0.5   Global Health Score 1.5   RAPID3 Score 3.56       Monitoring Lab Results:  Lab Results   Component Value Date    WBC 4.6 12/09/2024    RBC 5.25 12/09/2024    HGB 16.4 12/09/2024    HCT 49.0 12/09/2024    MCV 93.3 12/09/2024    MCH 31.2 12/09/2024    MCHC 33.5 12/09/2024    RDW 13.7 12/09/2024     12/09/2024        Lab Results   Component Value Date     09/30/2024    K 4.2 09/30/2024     09/30/2024    CO2 29 09/30/2024    GLU 96 09/30/2024    BUN 16 09/30/2024    CREATININE 1.06 09/30/2024    CALCIUM 9.4 09/30/2024    PROT 6.5 06/26/2024    ALBUMIN 4.2 06/26/2024    BILITOT 0.5 06/26/2024    ALKPHOS 91 04/10/2020    AST 34 06/26/2024    ALT 36 06/26/2024    EGFRNORACEVR 77 09/30/2024       Lab Results   Component Value Date    SEDRATE 2 12/09/2024    CRP <3.0 12/09/2024        No results found for: "VSGCHRFI28QH", "VGTJXPLD19"     No results found for: "CHOL", "HDL", "LDLCALC", "TRIG"    No results found for: "RF", "CCPANTIBODIE"  No results found for: "ANASCREEN", "ANATITER", "ANAPATTE", "DSDNA", "SMRNPAB", "SSAANTIBODY", "SSBANTIBODY", "OIW90BQ", "JO1AB"  No results found for: "HLABB27"    Infectious Disease Screening:  Lab Results   Component Value Date    HEPBSAG NON-REACTIVE 12/09/2024    HEPBCAB NON-REACTIVE 12/09/2024     Lab Results   Component Value Date    HEPCAB NON-REACTIVE 12/09/2024     Lab Results   Component Value Date    QUANTTBGDPL NEGATIVE 12/09/2024     No results found for: "QUANTIFERON", "SVCMT", "QUANTAGVALUE", "QUANTNILVALU", "QUANTMITOGEN", "QFTTBAG", "QINT"     Imaging: DEXA, Xrays, MRIs, CTs, etc    Old & " Outside Medical Records:  Reviewed old and all outside medical records available in Care Everywhere     Assessment:   .  Encounter Diagnoses   Name Primary?    PSA (psoriatic arthritis)     Lumbar radiculopathy Yes    Chronic pain syndrome     Achilles tendinitis, unspecified laterality     Degeneration of intervertebral disc of lumbar region with lower extremity pain     High risk medication use         Plan:      Encounter Diagnoses   Name Primary?    PSA (psoriatic arthritis)     Lumbar radiculopathy Yes    Chronic pain syndrome     Achilles tendinitis, unspecified laterality     Degeneration of intervertebral disc of lumbar region with lower extremity pain     High risk medication use      Tera was seen today for disease management.    Diagnoses and all orders for this visit:    Lumbar radiculopathy  -     pregabalin (LYRICA) 75 MG capsule; Take 1 capsule (75 mg total) by mouth 2 (two) times daily.  -     HYDROcodone-acetaminophen (NORCO)  mg per tablet; Take 1 tablet by mouth every 6 (six) hours as needed for Pain.  -     HYDROcodone-acetaminophen (NORCO)  mg per tablet; Take 1 tablet by mouth every 6 (six) hours as needed for Pain.  -     HYDROcodone-acetaminophen (NORCO)  mg per tablet; Take 1 tablet by mouth every 6 (six) hours as needed for Pain.  -     ketorolac injection 60 mg  -     methylPREDNISolone acetate injection 160 mg  -     cyanocobalamin injection 1,000 mcg  -     Sedimentation rate; Future  -     C-Reactive Protein; Future  -     Comprehensive Metabolic Panel; Future  -     CBC Auto Differential; Future  -     T4, Free; Future  -     TSH; Future  -     T3, Free; Future  -     methylPREDNISolone acetate injection 80 mg  -     methylPREDNISolone acetate injection 80 mg    PSA (psoriatic arthritis)  -     pregabalin (LYRICA) 75 MG capsule; Take 1 capsule (75 mg total) by mouth 2 (two) times daily.  -     HYDROcodone-acetaminophen (NORCO)  mg per tablet; Take 1 tablet by  mouth every 6 (six) hours as needed for Pain.  -     HYDROcodone-acetaminophen (NORCO)  mg per tablet; Take 1 tablet by mouth every 6 (six) hours as needed for Pain.  -     HYDROcodone-acetaminophen (NORCO)  mg per tablet; Take 1 tablet by mouth every 6 (six) hours as needed for Pain.  -     eszopiclone (LUNESTA) 3 mg Tab; TAKE 1 TABLET BY MOUTH NIGHTLY AS NEEDED  -     ketorolac injection 60 mg  -     methylPREDNISolone acetate injection 160 mg  -     cyanocobalamin injection 1,000 mcg  -     Sedimentation rate; Future  -     C-Reactive Protein; Future  -     Comprehensive Metabolic Panel; Future  -     CBC Auto Differential; Future  -     T4, Free; Future  -     TSH; Future  -     T3, Free; Future  -     methylPREDNISolone acetate injection 80 mg  -     methylPREDNISolone acetate injection 80 mg    Chronic pain syndrome  -     pregabalin (LYRICA) 75 MG capsule; Take 1 capsule (75 mg total) by mouth 2 (two) times daily.  -     HYDROcodone-acetaminophen (NORCO)  mg per tablet; Take 1 tablet by mouth every 6 (six) hours as needed for Pain.  -     HYDROcodone-acetaminophen (NORCO)  mg per tablet; Take 1 tablet by mouth every 6 (six) hours as needed for Pain.  -     HYDROcodone-acetaminophen (NORCO)  mg per tablet; Take 1 tablet by mouth every 6 (six) hours as needed for Pain.  -     eszopiclone (LUNESTA) 3 mg Tab; TAKE 1 TABLET BY MOUTH NIGHTLY AS NEEDED  -     ketorolac injection 60 mg  -     methylPREDNISolone acetate injection 160 mg  -     cyanocobalamin injection 1,000 mcg  -     Sedimentation rate; Future  -     C-Reactive Protein; Future  -     Comprehensive Metabolic Panel; Future  -     CBC Auto Differential; Future  -     T4, Free; Future  -     TSH; Future  -     T3, Free; Future  -     methylPREDNISolone acetate injection 80 mg  -     methylPREDNISolone acetate injection 80 mg    Achilles tendinitis, unspecified laterality  -     Sedimentation rate; Future  -     C-Reactive  Protein; Future  -     Comprehensive Metabolic Panel; Future  -     CBC Auto Differential; Future  -     T4, Free; Future  -     TSH; Future  -     T3, Free; Future  -     methylPREDNISolone acetate injection 80 mg  -     methylPREDNISolone acetate injection 80 mg    Degeneration of intervertebral disc of lumbar region with lower extremity pain  -     pregabalin (LYRICA) 75 MG capsule; Take 1 capsule (75 mg total) by mouth 2 (two) times daily.  -     HYDROcodone-acetaminophen (NORCO)  mg per tablet; Take 1 tablet by mouth every 6 (six) hours as needed for Pain.  -     HYDROcodone-acetaminophen (NORCO)  mg per tablet; Take 1 tablet by mouth every 6 (six) hours as needed for Pain.  -     HYDROcodone-acetaminophen (NORCO)  mg per tablet; Take 1 tablet by mouth every 6 (six) hours as needed for Pain.  -     ketorolac injection 60 mg  -     methylPREDNISolone acetate injection 160 mg  -     cyanocobalamin injection 1,000 mcg  -     Sedimentation rate; Future  -     C-Reactive Protein; Future  -     Comprehensive Metabolic Panel; Future  -     CBC Auto Differential; Future  -     T4, Free; Future  -     TSH; Future  -     T3, Free; Future  -     methylPREDNISolone acetate injection 80 mg  -     methylPREDNISolone acetate injection 80 mg    High risk medication use  -     pregabalin (LYRICA) 75 MG capsule; Take 1 capsule (75 mg total) by mouth 2 (two) times daily.  -     HYDROcodone-acetaminophen (NORCO)  mg per tablet; Take 1 tablet by mouth every 6 (six) hours as needed for Pain.  -     HYDROcodone-acetaminophen (NORCO)  mg per tablet; Take 1 tablet by mouth every 6 (six) hours as needed for Pain.  -     HYDROcodone-acetaminophen (NORCO)  mg per tablet; Take 1 tablet by mouth every 6 (six) hours as needed for Pain.  -     Sedimentation rate; Future  -     C-Reactive Protein; Future  -     Comprehensive Metabolic Panel; Future  -     CBC Auto Differential; Future  -     T4, Free;  Future  -     TSH; Future  -     T3, Free; Future  -     methylPREDNISolone acetate injection 80 mg  -     methylPREDNISolone acetate injection 80 mg    Primary insomnia  -     eszopiclone (LUNESTA) 3 mg Tab; TAKE 1 TABLET BY MOUTH NIGHTLY AS NEEDED  -     methylPREDNISolone acetate injection 80 mg  -     methylPREDNISolone acetate injection 80 mg        1. Labs ordered  2. Nurse injections  3. Norco refill     Follow-up 4 months      More than 50% of the  57 minute encounter was spent face to face counseling the patient regarding current status and future plan of care as well as side effects  of the medications. All questions were answered to patient's satisfaction also includes  non-face to face time preparing to see the patient (eg, review of tests), Obtaining and/or reviewing separately obtained history, Documenting clinical information in the electronic or other health record, Independently interpreting results            [1]   Social History  Tobacco Use    Smoking status: Never    Smokeless tobacco: Current    Tobacco comments:     occassional dip   Substance Use Topics    Alcohol use: No    Drug use: No

## 2025-03-10 NOTE — PATIENT INSTRUCTIONS
It was a pleasure meeting you today. As a reminder, my name is Dr. Denise Coreas. I'm the clinical pharmacist in the Rheumatology office. If you have any questions or need any assistance, please reach out to the office.    Next Skyrizi injection on Monday, 4/7/2025 then start maintenance dosing (150 mg every 12 weeks) on Monday, 6/30/2025    Please wait at least 7 days after receiving your first Skyrizi injection before receiving any vaccines  - Vaccines:   COVID vaccine: 1 new [id number] formulated dose - Local pharmacy    Respiratory syncytial virus (RSV): 1 dose - Local pharmacy   Shingles (Shingrix): 2 doses 2-6 months apart - PCP's office or local pharmacy  Tetanus (Tdap): 1 dose every 10 years - Local pharmacy

## 2025-03-17 ENCOUNTER — CLINICAL SUPPORT (OUTPATIENT)
Dept: PHYSICAL MEDICINE AND REHAB | Facility: CLINIC | Age: 70
End: 2025-03-17
Payer: MEDICARE

## 2025-03-17 VITALS
HEART RATE: 89 BPM | BODY MASS INDEX: 26.93 KG/M2 | HEIGHT: 75 IN | SYSTOLIC BLOOD PRESSURE: 148 MMHG | WEIGHT: 216.63 LBS | DIASTOLIC BLOOD PRESSURE: 85 MMHG

## 2025-03-17 DIAGNOSIS — M17.0 PRIMARY OSTEOARTHRITIS OF BOTH KNEES: Primary | ICD-10-CM

## 2025-03-17 DIAGNOSIS — M75.41 SHOULDER IMPINGEMENT SYNDROME, RIGHT: ICD-10-CM

## 2025-03-17 DIAGNOSIS — M75.42 SHOULDER IMPINGEMENT SYNDROME, LEFT: ICD-10-CM

## 2025-03-17 DIAGNOSIS — M70.61 TROCHANTERIC BURSITIS OF BOTH HIPS: ICD-10-CM

## 2025-03-17 DIAGNOSIS — M70.62 TROCHANTERIC BURSITIS OF BOTH HIPS: ICD-10-CM

## 2025-03-17 PROCEDURE — 99999 PR PBB SHADOW E&M-EST. PATIENT-LVL IV: CPT | Mod: PBBFAC,,, | Performed by: STUDENT IN AN ORGANIZED HEALTH CARE EDUCATION/TRAINING PROGRAM

## 2025-03-17 RX ORDER — BETAMETHASONE SODIUM PHOSPHATE AND BETAMETHASONE ACETATE 3; 3 MG/ML; MG/ML
6 INJECTION, SUSPENSION INTRA-ARTICULAR; INTRALESIONAL; INTRAMUSCULAR; SOFT TISSUE
Status: DISCONTINUED | OUTPATIENT
Start: 2025-03-17 | End: 2025-03-17 | Stop reason: HOSPADM

## 2025-03-17 RX ADMIN — BETAMETHASONE SODIUM PHOSPHATE AND BETAMETHASONE ACETATE 6 MG: 3; 3 INJECTION, SUSPENSION INTRA-ARTICULAR; INTRALESIONAL; INTRAMUSCULAR; SOFT TISSUE at 09:03

## 2025-03-17 NOTE — PROGRESS NOTES
PHYSICAL MEDICINE AND REHABILITATION  Follow up visit:    Subjective:   Chief Complaint:    Bilateral shoulder, bilateral knee bilateral hips    Interval note on 03/17/2025:  Patient arrives today for scheduled follow up.  He is here for bilateral durolane knee injections but would also like to discuss his hip pain.  He has bilateral total hip arthroplasties.  His right side is worse in his left.  He points to his bilateral greater trochanteric processes.  He describes the pain as a deep ache.  Unsure what makes it better or worse.  Interested in having bilateral knee injections.  The spasms he was experiencing than in the right hip have resolved with over-the-counter Slow-Mag.  His current hip pain complaints are different from what we previously addressed.    Interval note on 02/07/2025:  Patient arrives today for scheduled follow up. Underwent bilateral durolane injections on 9/12/24 with good relief of his pain. Left SAB on 8/30/24 with good relief of his pain. Requesting injections into both shoulders and both knees today.  He is only 5 months from his previous Durolane injections therefore we should wait on this for now.  His past injections have provided him with significant relief..     HPI: He was sent to me for consultation for bilateral knee pain.  He also would like to discuss his bilateral shoulder pain as well as some bruising in his right anterior thigh.      For the shoulders, this has been a chronic issue.  Denies any associated trauma.  He has seen orthopedics in the past who have discussed surgery with him.  He has also had steroid injections in the past which do provided proximally 30 days of pain relief.  He notes a bone spur in the left shoulder.  He also notes some atrophy of the left superior shoulder along the superior scapula.  He describes the pain as sharp, aching and pressure.  Worse with using his shoulder.  He has a history of 2 cervical spine surgeries: The most recent was in November  of 2023 and the 2nd was 2-3 years prior to that.  X-rays of the right shoulder from 03/06/2023 show mild AC joint hypertrophy and no significant glenohumeral joint where as well as age-appropriate subacromial spurring.  X-rays of the left shoulder from 07/17/2023 show moderate AC joint arthropathy as well as moderate glenohumeral osteoarthritis.  A note from Dr. Joey Kim on 05/28/2024 shows a left subacromial bursa injection with 60 mg of Kenalog.    For the knees, this is a chronic issue.  He recalls having steroid injections in the past without significant relief.  He has seen orthopedics in the past for the right knee and was diagnosed with a degenerative right knee condition.  X-rays of the right knee from 04/22/2024 show moderate knee degenerative changes.  He was given an intra-articular injection with 60 mg of Kenalog at his previous visit with Dr. Joey Ghosh.     For the right thigh, this started as a very strong spasms.  He reports associated bruising in the distal thigh.  The spasms have not occurred since.  He does have associated tenderness in the area where the bruising is currently present.  Symptoms have been improving.    Review of Systems  Weakness and joint swelling    Imaging/Diagnostic Studies  XR SHOULDER COMPLETE 2 OR MORE VIEWS RIGHT  Order: [id number]  Narrative    Final Impression    4 views right shoulder: No evidence of acute bony finding.  No obvious  sign of fracture or dislocation.  Mild acromioclavicular joint  hypertrophy.  No significant glenohumeral joint wear, age-appropriate.    Subacromial spurring    Joey Ghosh MD 3/6/2023 12:13 PM  ====    XR SHOULDER 2+ VIEW LEFT    History of left shoulder pain. 4 views.    There is moderate AC joint arthropathy. There is moderate glenohumeral osteoarthritis.  No acute abnormality noted.  Exam End: 07/17/23 08:34 Last Resulted: 07/17/23 08:52     ===  Exam:  XR KNEE 3 VIEW RIGHT  Date of Exam:  4/22/2024 10:29 AM  Clinical  Indication: , pain  Comparison:  None.  Technique:  XR KNEE 3 VIEW RIGHT  Findings:  No acute fracture or malalignment. No joint effusion.  Moderate knee degenerative changes.  Impression:  Moderately degenerative changes.  Exam End: 04/22/24 10:55 Last Resulted: 04/22/24 14:36       Past Medical History:   Diagnosis Date    Arthritis     Back pain        Past Surgical History:   Procedure Laterality Date    FOOT SURGERY      JOINT REPLACEMENT  4/18/2012    left hip    SINUS SURGERY         Review of patient's allergies indicates:   Allergen Reactions    Lisinopril Other (See Comments)    Penicillins Nausea And Vomiting and Other (See Comments)    Ampicillin     Hydrocodone     Penicillin        Current Outpatient Medications   Medication Sig Dispense Refill    aluminum & magnesium hydroxide-simethicone (MYLANTA MAX STRENGTH) 400-400-40 mg/5 mL suspension Take 5 mLs by mouth every 6 (six) hours as needed for Indigestion.      anastrozole (ARIMIDEX) 1 mg Tab Take 1 mg by mouth.      calcipotriene (DOVONOX) 0.005 % cream Apply topically 2 (two) times daily. 120 g 3    celecoxib (CELEBREX) 200 MG capsule Take 1 capsule (200 mg total) by mouth once daily. 30 capsule 6    clindamycin (CLEOCIN T) 1 % external solution Apply topically 2 (two) times daily. 60 mL 5    clobetasol 0.05% (TEMOVATE) 0.05 % Oint Apply topically 2 (two) times daily. 45 g 4    cyclobenzaprine (FLEXERIL) 10 MG tablet Take 10 mg by mouth 3 (three) times daily.      dorzolamide (TRUSOPT) 2 % ophthalmic solution Place 1 drop into both eyes 2 (two) times daily.      eszopiclone (LUNESTA) 3 mg Tab TAKE 1 TABLET BY MOUTH NIGHTLY AS NEEDED 30 tablet 4    fluticasone propionate (FLONASE) 50 mcg/actuation nasal spray fluticasone propionate 50 mcg/actuation nasal spray,suspension      HYDROcodone-acetaminophen (NORCO)  mg per tablet Take 1 tablet by mouth every 6 (six) hours as needed for Pain. 120 tablet 0    HYDROcodone-acetaminophen (NORCO)  mg  per tablet Take 1 tablet by mouth every 6 (six) hours as needed for Pain. 120 tablet 0    HYDROcodone-acetaminophen (NORCO)  mg per tablet Take 1 tablet by mouth every 6 (six) hours as needed for Pain. 120 tablet 0    [START ON 4/8/2025] HYDROcodone-acetaminophen (NORCO)  mg per tablet Take 1 tablet by mouth every 6 (six) hours as needed for Pain. 120 tablet 0    [START ON 5/7/2025] HYDROcodone-acetaminophen (NORCO)  mg per tablet Take 1 tablet by mouth every 6 (six) hours as needed for Pain. 120 tablet 0    hydrocortisone 2.5 % ointment APPLY A SMALL AMOUNT 2 TIMES DAILY      montelukast (SINGULAIR) 10 mg tablet Take 10 mg by mouth.      omeprazole (PRILOSEC) 40 MG capsule Take 40 mg by mouth every morning.       ondansetron (ZOFRAN-ODT) 8 MG TbDL Take 8 mg by mouth every 6 (six) hours as needed.      pantoprazole (PROTONIX) 40 MG tablet Take 1 tablet (40 mg total) by mouth daily      pregabalin (LYRICA) 75 MG capsule Take 1 capsule (75 mg total) by mouth 2 (two) times daily. 60 capsule 6    risankizumab-rzaa (SKYRIZI) 150 mg/mL PnIj Inject 150 mg into the skin every 28 days. 1 mL 1    risankizumab-rzaa (SKYRIZI) 150 mg/mL PnIj Inject 150 mg into the skin every 12 weeks. 1 mL 4    RSV, preF A and preF B,PF, (ABRYSVO) 120 mcg/0.5 mL SolR vaccine Inject 0.5 mLs (120 mcg total) into the muscle once. for 1 dose 0.5 mL 0    sodium chloride (OCEAN) 0.65 % nasal spray 2 drops in each nostril as needed      triamcinolone acetonide 0.1% (KENALOG) 0.1 % ointment Apply topically 2 (two) times daily. 80 g 3    cetirizine-pseudoephedrine 5-120 mg Tb12 1 tablet (Patient not taking: Reported on 3/17/2025)      famotidine (PEPCID) 40 MG tablet 90 tablets. (Patient not taking: Reported on 3/17/2025)      polyethylene glycol (GLYCOLAX) 17 gram/dose powder Take 17 g by mouth once daily. (Patient not taking: Reported on 3/17/2025)      tadalafiL (CIALIS) 5 MG tablet Take 5 mg by mouth once daily. (Patient not taking:  "Reported on 3/17/2025)       No current facility-administered medications for this visit.       Family History   Problem Relation Name Age of Onset    Anesthesia problems Neg Hx      Clotting disorder Neg Hx         Social History     Socioeconomic History    Marital status:    Tobacco Use    Smoking status: Never    Smokeless tobacco: Current    Tobacco comments:     occassional dip   Substance and Sexual Activity    Alcohol use: No    Drug use: No     Social Drivers of Health     Financial Resource Strain: Low Risk  (3/3/2025)    Overall Financial Resource Strain (CARDIA)     Difficulty of Paying Living Expenses: Not hard at all   Food Insecurity: No Food Insecurity (3/3/2025)    Hunger Vital Sign     Worried About Running Out of Food in the Last Year: Never true     Ran Out of Food in the Last Year: Never true   Physical Activity: Insufficiently Active (3/3/2025)    Exercise Vital Sign     Days of Exercise per Week: 3 days     Minutes of Exercise per Session: 20 min   Stress: No Stress Concern Present (3/3/2025)    [employer] - Occupational Stress Questionnaire     Feeling of Stress : Not at all   Housing Stability: Unknown (3/3/2025)    Housing Stability Vital Sign     Unable to Pay for Housing in the Last Year: No         Objective:    BP (!) 148/85 (BP Location: Left arm, Patient Position: Sitting)   Pulse 89   Ht 6' 3" (1.905 m)   Wt 98.2 kg (216 lb 9.6 oz)   BMI 27.07 kg/m²   Physical Exam  Constitutional:       Appearance: Normal appearance.   HENT:      Head: Normocephalic and atraumatic.   Eyes:      Extraocular Movements: Extraocular movements intact.   Cardiovascular:      Rate and Rhythm: Normal rate.   Pulmonary:      Effort: Pulmonary effort is normal.   Abdominal:      General: Abdomen is flat.   Musculoskeletal:         General: Normal range of motion.      Right knee: No effusion.      Left knee: No effusion.   Skin:     General: Skin is warm and dry. "   Neurological:      General: No focal deficit present.      Mental Status: He is alert and oriented to person, place, and time. Mental status is at baseline.   Psychiatric:         Mood and Affect: Mood normal.         Behavior: Behavior normal.         Thought Content: Thought content normal.        Right Knee Exam     Tenderness   The patient is experiencing tenderness in the lateral joint line and medial joint line.    Range of Motion   Extension:  normal     Other   Erythema: absent  Scars: absent  Sensation: normal  Pulse: present  Swelling: none  Effusion: no effusion present      Left Knee Exam     Tenderness   The patient is experiencing tenderness in the lateral joint line and medial joint line.    Range of Motion   Extension:  normal     Other   Erythema: absent  Scars: absent  Sensation: normal  Pulse: present  Swelling: none  Effusion: no effusion present      Right Shoulder Exam     Muscle Strength   The patient has normal right shoulder strength.  Abduction: 4/5   Internal rotation: 5/5   External rotation: 5/5   Supraspinatus: 4/5   Subscapularis: 5/5   Biceps: 4/5     Tests   [place] test: positive  Impingement: positive  Sulcus: absent    Other   Erythema: absent  Scars: absent  Sensation: normal  Pulse: present    Comments:  + jobes  Negative [place]'s      Left Shoulder Exam     Muscle Strength   Abduction: 4/5   Internal rotation: 5/5   External rotation: 5/5   Supraspinatus: 4/5   Subscapularis: 5/5   Biceps: 5/5     Tests   [place] test: positive  Impingement: positive  Sulcus: present    Other   Erythema: absent  Scars: present  Sensation: normal  Pulse: present     Comments:  + jobes  Mildly positive [place]'s               Assessment:       ICD-10-CM ICD-9-CM    1. Primary osteoarthritis of both knees  M17.0 715.16 Large Joint Aspiration/Injection: bilateral knee (Durolane)      ULS US Guidance for Needle Placement      2. Trochanteric bursitis of both hips  M70.61 726.5 Bilateral greater  trochanteric bursa injections    M70.62        3. Shoulder impingement syndrome, left  M75.42 726.2       4. Shoulder impingement syndrome, right  M75.41 726.2               Plan:   1. Primary osteoarthritis of both knees  Assessment & Plan:  Bilateral Durolane injections today as he has had excellent relief with these in the past.  Kellgren Nick score of 3  Return to clinic after 9/17/25 for repeat.     Orders:  -     Large Joint Aspiration/Injection: bilateral knee (Durolane)  -     ULS US Guidance for Needle Placement    2. Trochanteric bursitis of both hips  Assessment & Plan:  Bilateral GTB injections with steroid. See procedure note for details.      Orders:  -     Bilateral greater trochanteric bursa injections    3. Shoulder impingement syndrome, left  Assessment & Plan:  Continue home exercise program  Pain has improved following subacromial bursa injection on 2/7/25.  If insufficient relief with previous injection, consider a left glenohumeral joint injection at next visit.      4. Shoulder impingement syndrome, right  Assessment & Plan:  Continue home exercise program  Pain has improved following subacromial bursa injection on 2/7/25.  If insufficient relief with previous injection, consider a right glenohumeral joint injection at next visit.        Beny Archibald MD  Physical Medicine & Rehabilitation     Disclaimer:  This note may have been prepared using voice recognition software, it may have not been extensively proofed, as such there could be errors within the text such as sound alike errors.  Contact the author of this note for clarification.

## 2025-03-17 NOTE — PROCEDURES
Large Joint Aspiration/Injection: bilateral knee (Durolane)    Date/Time: 3/17/2025 9:00 AM    Performed by: Beny Archibald MD  Authorized by: Beny Archibald MD    Consent Done?:  Yes (Verbal)  Indications:  Arthritis, diagnostic evaluation and pain  Site marked: the procedure site was marked    Timeout: prior to procedure the correct patient, procedure, and site was verified    Prep: patient was prepped and draped in usual sterile fashion    Local anesthesia used?: No (ethyl chloride spray)      Details:  Needle Size:  22 G  Ultrasonic Guidance for needle placement?: Yes    Images are saved and documented.  Approach:  Anterolateral  Location:  Knee  Laterality:  Bilateral  Site:  Bilateral knee  Medications (Right):  60 mg hyaluronate sodium, stabilized (DUROLANE) 60 mg/3 mL  Medications (Left):  60 mg hyaluronate sodium, stabilized (DUROLANE) 60 mg/3 mL  Patient tolerance:  Patient tolerated the procedure well with no immediate complications  Bilateral greater trochanteric bursa injections    Date/Time: 3/17/2025 9:00 AM    Performed by: Beny Archibald MD  Authorized by: Beny Archibald MD    Consent Done?:  Yes (Verbal)  Timeout: prior to procedure the correct patient, procedure, and site was verified    Indications:  Diagnostic evaluation and pain  Site marked: the procedure site was marked    Timeout: prior to procedure the correct patient, procedure, and site was verified    Location:  Hip  Hip joint: BILATERAL GT bursa/glute tendon.  Prep: patient was prepped and draped in usual sterile fashion    Ultrasonic Guidance for Needle Placement?: Yes    Needle size:  22 G (spinal)  Approach:  Posterolateral  Medications:  6 mg betamethasone acetate-betamethasone sodium phosphate 6 mg/mL  Patient tolerance:  Patient tolerated the procedure well with no immediate complications   Ultrasound guidance was used for correct needle placement, the images were saved will be uploaded to  EMR.

## 2025-03-17 NOTE — ASSESSMENT & PLAN NOTE
Continue home exercise program  Pain has improved following subacromial bursa injection on 2/7/25.  If insufficient relief with previous injection, consider a left glenohumeral joint injection at next visit.

## 2025-03-17 NOTE — ASSESSMENT & PLAN NOTE
Bilateral Durolane injections today as he has had excellent relief with these in the past.  Kellgren Nick score of 3  Return to clinic after 9/17/25 for repeat.

## 2025-03-17 NOTE — ASSESSMENT & PLAN NOTE
Continue home exercise program  Pain has improved following subacromial bursa injection on 2/7/25.  If insufficient relief with previous injection, consider a right glenohumeral joint injection at next visit.

## 2025-04-24 ENCOUNTER — OFFICE VISIT (OUTPATIENT)
Dept: PHYSICAL MEDICINE AND REHAB | Facility: CLINIC | Age: 70
End: 2025-04-24
Payer: MEDICARE

## 2025-04-24 DIAGNOSIS — M75.41 SHOULDER IMPINGEMENT SYNDROME, RIGHT: ICD-10-CM

## 2025-04-24 DIAGNOSIS — M75.42 SHOULDER IMPINGEMENT SYNDROME, LEFT: Primary | ICD-10-CM

## 2025-04-24 NOTE — ASSESSMENT & PLAN NOTE
Continue home exercise program  Pain has improved following subacromial bursa injection on 2/7/25 but has since returned.  He would like a repeat injection given the amount of relief he experienced from previous injections  If insufficient relief with previous injection, consider a left glenohumeral joint injection at next visit.

## 2025-04-24 NOTE — PROGRESS NOTES
PHYSICAL MEDICINE AND REHABILITATION  Follow up virtual visit:    Subjective:   Chief Complaint:    Bilateral shoulder, bilateral knee bilateral hips    Interval note on 04/24/2025:  Patient arrives today for scheduled follow up via virtual visit. Hoping for an earlier appt for injections into the shoulders. Having trouble sleeping. Taking pain medications for particularly bad days.     Review of Systems  Weakness and joint swelling    Imaging/Diagnostic Studies  XR SHOULDER COMPLETE 2 OR MORE VIEWS RIGHT  Order: [id number]  Narrative    Final Impression    4 views right shoulder: No evidence of acute bony finding.  No obvious  sign of fracture or dislocation.  Mild acromioclavicular joint  hypertrophy.  No significant glenohumeral joint wear, age-appropriate.    Subacromial spurring    Joey Ghosh MD 3/6/2023 12:13 PM  ====    XR SHOULDER 2+ VIEW LEFT    History of left shoulder pain. 4 views.    There is moderate AC joint arthropathy. There is moderate glenohumeral osteoarthritis.  No acute abnormality noted.  Exam End: 07/17/23 08:34 Last Resulted: 07/17/23 08:52     ===  Exam:  XR KNEE 3 VIEW RIGHT  Date of Exam:  4/22/2024 10:29 AM  Clinical Indication: , pain  Comparison:  None.  Technique:  XR KNEE 3 VIEW RIGHT  Findings:  No acute fracture or malalignment. No joint effusion.  Moderate knee degenerative changes.  Impression:  Moderately degenerative changes.  Exam End: 04/22/24 10:55 Last Resulted: 04/22/24 14:36       Past Medical History:   Diagnosis Date    Arthritis     Back pain        Past Surgical History:   Procedure Laterality Date    FOOT SURGERY      JOINT REPLACEMENT  4/18/2012    left hip    SINUS SURGERY         Review of patient's allergies indicates:   Allergen Reactions    Lisinopril Other (See Comments)    Penicillins Nausea And Vomiting and Other (See Comments)    Ampicillin     Hydrocodone     Penicillin        Current Outpatient Medications   Medication Sig Dispense Refill    aluminum  & magnesium hydroxide-simethicone (MYLANTA MAX STRENGTH) 400-400-40 mg/5 mL suspension Take 5 mLs by mouth every 6 (six) hours as needed for Indigestion.      anastrozole (ARIMIDEX) 1 mg Tab Take 1 mg by mouth.      calcipotriene (DOVONOX) 0.005 % cream Apply topically 2 (two) times daily. 120 g 3    celecoxib (CELEBREX) 200 MG capsule Take 1 capsule (200 mg total) by mouth once daily. 30 capsule 6    cetirizine-pseudoephedrine 5-120 mg Tb12 1 tablet (Patient not taking: Reported on 3/17/2025)      clindamycin (CLEOCIN T) 1 % external solution Apply topically 2 (two) times daily. 60 mL 5    clobetasol 0.05% (TEMOVATE) 0.05 % Oint Apply topically 2 (two) times daily. 45 g 4    cyclobenzaprine (FLEXERIL) 10 MG tablet Take 10 mg by mouth 3 (three) times daily.      dorzolamide (TRUSOPT) 2 % ophthalmic solution Place 1 drop into both eyes 2 (two) times daily.      eszopiclone (LUNESTA) 3 mg Tab TAKE 1 TABLET BY MOUTH NIGHTLY AS NEEDED 30 tablet 4    famotidine (PEPCID) 40 MG tablet 90 tablets. (Patient not taking: Reported on 3/17/2025)      fluticasone propionate (FLONASE) 50 mcg/actuation nasal spray fluticasone propionate 50 mcg/actuation nasal spray,suspension      HYDROcodone-acetaminophen (NORCO)  mg per tablet Take 1 tablet by mouth every 6 (six) hours as needed for Pain. 120 tablet 0    HYDROcodone-acetaminophen (NORCO)  mg per tablet Take 1 tablet by mouth every 6 (six) hours as needed for Pain. 120 tablet 0    HYDROcodone-acetaminophen (NORCO)  mg per tablet Take 1 tablet by mouth every 6 (six) hours as needed for Pain. 120 tablet 0    [START ON 5/7/2025] HYDROcodone-acetaminophen (NORCO)  mg per tablet Take 1 tablet by mouth every 6 (six) hours as needed for Pain. 120 tablet 0    hydrocortisone 2.5 % ointment APPLY A SMALL AMOUNT 2 TIMES DAILY      montelukast (SINGULAIR) 10 mg tablet Take 10 mg by mouth.      omeprazole (PRILOSEC) 40 MG capsule Take 40 mg by mouth every morning.        ondansetron (ZOFRAN-ODT) 8 MG TbDL Take 8 mg by mouth every 6 (six) hours as needed.      pantoprazole (PROTONIX) 40 MG tablet Take 1 tablet (40 mg total) by mouth daily      polyethylene glycol (GLYCOLAX) 17 gram/dose powder Take 17 g by mouth once daily. (Patient not taking: Reported on 3/17/2025)      pregabalin (LYRICA) 75 MG capsule Take 1 capsule (75 mg total) by mouth 2 (two) times daily. 60 capsule 6    risankizumab-rzaa (SKYRIZI) 150 mg/mL PnIj Inject 150 mg into the skin every 28 days. 1 mL 1    risankizumab-rzaa (SKYRIZI) 150 mg/mL PnIj Inject 150 mg into the skin every 12 weeks. 1 mL 4    RSV, preF A and preF B,PF, (ABRYSVO) 120 mcg/0.5 mL SolR vaccine Inject 0.5 mLs (120 mcg total) into the muscle once. for 1 dose 0.5 mL 0    sodium chloride (OCEAN) 0.65 % nasal spray 2 drops in each nostril as needed      tadalafiL (CIALIS) 5 MG tablet Take 5 mg by mouth once daily. (Patient not taking: Reported on 3/17/2025)      triamcinolone acetonide 0.1% (KENALOG) 0.1 % ointment Apply topically 2 (two) times daily. 80 g 3     No current facility-administered medications for this visit.       Family History   Problem Relation Name Age of Onset    Anesthesia problems Neg Hx      Clotting disorder Neg Hx         Social History     Socioeconomic History    Marital status:    Tobacco Use    Smoking status: Never    Smokeless tobacco: Current    Tobacco comments:     occassional dip   Substance and Sexual Activity    Alcohol use: No    Drug use: No     Social Drivers of Health     Financial Resource Strain: Low Risk  (3/3/2025)    Overall Financial Resource Strain (CARDIA)     Difficulty of Paying Living Expenses: Not hard at all   Food Insecurity: No Food Insecurity (3/3/2025)    Hunger Vital Sign     Worried About Running Out of Food in the Last Year: Never true     Ran Out of Food in the Last Year: Never true   Physical Activity: Insufficiently Active (3/3/2025)    Exercise Vital Sign     Days of Exercise per  Week: 3 days     Minutes of Exercise per Session: 20 min   Stress: No Stress Concern Present (3/3/2025)    [employer] - Occupational Stress Questionnaire     Feeling of Stress : Not at all   Housing Stability: Unknown (3/3/2025)    Housing Stability Vital Sign     Unable to Pay for Housing in the Last Year: No         Objective:    There were no vitals taken for this visit.  Physical Exam  Constitutional:       Appearance: Normal appearance.   HENT:      Head: Normocephalic and atraumatic.   Neurological:      General: No focal deficit present.      Mental Status: He is alert.        Ortho Exam       Assessment:       ICD-10-CM ICD-9-CM    1. Shoulder impingement syndrome, left  M75.42 726.2       2. Shoulder impingement syndrome, right  M75.41 726.2           Plan:   1. Shoulder impingement syndrome, left  Assessment & Plan:  Continue home exercise program  Pain has improved following subacromial bursa injection on 2/7/25 but has since returned.  He would like a repeat injection given the amount of relief he experienced from previous injections  If insufficient relief with previous injection, consider a left glenohumeral joint injection at next visit.      2. Shoulder impingement syndrome, right  Assessment & Plan:  Continue home exercise program  Pain has improved following subacromial bursa injection on 2/7/25 but has since returned.  He would like a repeat injection given the amount of relief he experienced from previous injections  If insufficient relief with previous injection, consider a left glenohumeral joint injection at next visit.        Beny Archibald MD  Physical Medicine & Rehabilitation     Disclaimer:  This note may have been prepared using voice recognition software, it may have not been extensively proofed, as such there could be errors within the text such as sound alike errors.  Contact the author of this note for clarification.

## 2025-05-02 DIAGNOSIS — M54.16 LUMBAR RADICULOPATHY: ICD-10-CM

## 2025-05-02 DIAGNOSIS — Z79.899 HIGH RISK MEDICATION USE: ICD-10-CM

## 2025-05-02 DIAGNOSIS — M51.361 DEGENERATION OF INTERVERTEBRAL DISC OF LUMBAR REGION WITH LOWER EXTREMITY PAIN: ICD-10-CM

## 2025-05-02 DIAGNOSIS — G89.4 CHRONIC PAIN SYNDROME: ICD-10-CM

## 2025-05-02 DIAGNOSIS — L40.50 PSA (PSORIATIC ARTHRITIS): ICD-10-CM

## 2025-05-05 ENCOUNTER — CLINICAL SUPPORT (OUTPATIENT)
Dept: PHYSICAL MEDICINE AND REHAB | Facility: CLINIC | Age: 70
End: 2025-05-05
Payer: MEDICARE

## 2025-05-05 VITALS
DIASTOLIC BLOOD PRESSURE: 87 MMHG | WEIGHT: 216.5 LBS | SYSTOLIC BLOOD PRESSURE: 157 MMHG | HEART RATE: 81 BPM | BODY MASS INDEX: 27.06 KG/M2

## 2025-05-05 DIAGNOSIS — M70.62 TROCHANTERIC BURSITIS OF BOTH HIPS: ICD-10-CM

## 2025-05-05 DIAGNOSIS — M70.61 TROCHANTERIC BURSITIS OF BOTH HIPS: ICD-10-CM

## 2025-05-05 DIAGNOSIS — M17.0 PRIMARY OSTEOARTHRITIS OF BOTH KNEES: ICD-10-CM

## 2025-05-05 DIAGNOSIS — M75.41 SHOULDER IMPINGEMENT SYNDROME, RIGHT: ICD-10-CM

## 2025-05-05 DIAGNOSIS — M75.42 SHOULDER IMPINGEMENT SYNDROME, LEFT: Primary | ICD-10-CM

## 2025-05-05 PROCEDURE — 20611 DRAIN/INJ JOINT/BURSA W/US: CPT | Mod: 50,S$GLB,, | Performed by: STUDENT IN AN ORGANIZED HEALTH CARE EDUCATION/TRAINING PROGRAM

## 2025-05-05 PROCEDURE — 99212 OFFICE O/P EST SF 10 MIN: CPT | Mod: 25,S$GLB,, | Performed by: STUDENT IN AN ORGANIZED HEALTH CARE EDUCATION/TRAINING PROGRAM

## 2025-05-05 PROCEDURE — 99999 PR PBB SHADOW E&M-EST. PATIENT-LVL II: CPT | Mod: PBBFAC,,, | Performed by: STUDENT IN AN ORGANIZED HEALTH CARE EDUCATION/TRAINING PROGRAM

## 2025-05-05 RX ORDER — METHYLPREDNISOLONE ACETATE 40 MG/ML
40 INJECTION, SUSPENSION INTRA-ARTICULAR; INTRALESIONAL; INTRAMUSCULAR; SOFT TISSUE
Status: DISCONTINUED | OUTPATIENT
Start: 2025-05-05 | End: 2025-05-05 | Stop reason: HOSPADM

## 2025-05-05 RX ORDER — HYDROCODONE BITARTRATE AND ACETAMINOPHEN 10; 325 MG/1; MG/1
1 TABLET ORAL EVERY 6 HOURS PRN
Qty: 120 TABLET | Refills: 0 | OUTPATIENT
Start: 2025-05-05 | End: 2025-06-04

## 2025-05-05 RX ADMIN — METHYLPREDNISOLONE ACETATE 40 MG: 40 INJECTION, SUSPENSION INTRA-ARTICULAR; INTRALESIONAL; INTRAMUSCULAR; SOFT TISSUE at 03:05

## 2025-05-05 NOTE — PROCEDURES
Large Joint Aspiration/Injection: bilateral subacromial bursa    Date/Time: 5/5/2025 3:30 PM    Performed by: Beny Archibald MD  Authorized by: Beny Archibald MD    Consent Done?:  Yes (Verbal)  Indications:  Arthritis, diagnostic evaluation and pain  Site marked: the procedure site was marked    Timeout: prior to procedure the correct patient, procedure, and site was verified    Prep: patient was prepped and draped in usual sterile fashion    Local anesthesia used?: No      Details:  Needle Size:  25 G  Ultrasonic Guidance for needle placement?: Yes    Images are saved and documented.  Approach:  Posterior  Location:  Shoulder  Laterality:  Bilateral  Site:  Bilateral subacromial bursa  Medications (Right):  40 mg methylPREDNISolone acetate 40 mg/mL  Medications (Left):  40 mg methylPREDNISolone acetate 40 mg/mL  Patient tolerance:  Patient tolerated the procedure well with no immediate complications      Ultrasound images saved in the patient's chart.

## 2025-05-05 NOTE — ASSESSMENT & PLAN NOTE
Knees are doing well following bilateral Durolane injections   Return to clinic after 9/17/25 for repeat when needed.

## 2025-05-05 NOTE — PROGRESS NOTES
PHYSICAL MEDICINE AND REHABILITATION  Follow up visit:    Subjective:   Chief Complaint:    Bilateral knee pain and bilateral shoulder pain    Interval note on 05/05/2025:  Patient arrives today for scheduled follow up. He reports new  re onset  bilateral shoulder pain over the past 2 weeks.  He associates the worsening pain with increased activity.  He continues to exercise regularly and attempts to participate in as much physical activity as his body can tolerate.  Denies any recent infection or antibiotic use.    Review of Systems  Weakness and joint swelling    Imaging/Diagnostic Studies  XR SHOULDER COMPLETE 2 OR MORE VIEWS RIGHT  Order: [id number]  Narrative    Final Impression    4 views right shoulder: No evidence of acute bony finding.  No obvious  sign of fracture or dislocation.  Mild acromioclavicular joint  hypertrophy.  No significant glenohumeral joint wear, age-appropriate.    Subacromial spurring    Joey Ghosh MD 3/6/2023 12:13 PM  ====    XR SHOULDER 2+ VIEW LEFT    History of left shoulder pain. 4 views.    There is moderate AC joint arthropathy. There is moderate glenohumeral osteoarthritis.  No acute abnormality noted.  Exam End: 07/17/23 08:34 Last Resulted: 07/17/23 08:52     ===  Exam:  XR KNEE 3 VIEW RIGHT  Date of Exam:  4/22/2024 10:29 AM  Clinical Indication: , pain  Comparison:  None.  Technique:  XR KNEE 3 VIEW RIGHT  Findings:  No acute fracture or malalignment. No joint effusion.  Moderate knee degenerative changes.  Impression:  Moderately degenerative changes.  Exam End: 04/22/24 10:55 Last Resulted: 04/22/24 14:36       Past Medical History:   Diagnosis Date    Arthritis     Back pain        Past Surgical History:   Procedure Laterality Date    FOOT SURGERY      JOINT REPLACEMENT  4/18/2012    left hip    SINUS SURGERY         Review of patient's allergies indicates:   Allergen Reactions    Lisinopril Other (See Comments)    Penicillins Nausea And Vomiting and Other (See  Comments)    Ampicillin     Hydrocodone     Penicillin        Current Outpatient Medications   Medication Sig Dispense Refill    aluminum & magnesium hydroxide-simethicone (MYLANTA MAX STRENGTH) 400-400-40 mg/5 mL suspension Take 5 mLs by mouth every 6 (six) hours as needed for Indigestion.      anastrozole (ARIMIDEX) 1 mg Tab Take 1 mg by mouth.      calcipotriene (DOVONOX) 0.005 % cream Apply topically 2 (two) times daily. 120 g 3    celecoxib (CELEBREX) 200 MG capsule Take 1 capsule (200 mg total) by mouth once daily. 30 capsule 6    cetirizine-pseudoephedrine 5-120 mg Tb12 1 tablet (Patient not taking: Reported on 3/17/2025)      clindamycin (CLEOCIN T) 1 % external solution Apply topically 2 (two) times daily. 60 mL 5    clobetasol 0.05% (TEMOVATE) 0.05 % Oint Apply topically 2 (two) times daily. 45 g 4    cyclobenzaprine (FLEXERIL) 10 MG tablet Take 10 mg by mouth 3 (three) times daily.      dorzolamide (TRUSOPT) 2 % ophthalmic solution Place 1 drop into both eyes 2 (two) times daily.      eszopiclone (LUNESTA) 3 mg Tab TAKE 1 TABLET BY MOUTH NIGHTLY AS NEEDED 30 tablet 4    famotidine (PEPCID) 40 MG tablet 90 tablets. (Patient not taking: Reported on 3/17/2025)      fluticasone propionate (FLONASE) 50 mcg/actuation nasal spray fluticasone propionate 50 mcg/actuation nasal spray,suspension      HYDROcodone-acetaminophen (NORCO)  mg per tablet Take 1 tablet by mouth every 6 (six) hours as needed for Pain. 120 tablet 0    HYDROcodone-acetaminophen (NORCO)  mg per tablet Take 1 tablet by mouth every 6 (six) hours as needed for Pain. 120 tablet 0    HYDROcodone-acetaminophen (NORCO)  mg per tablet Take 1 tablet by mouth every 6 (six) hours as needed for Pain. 120 tablet 0    [START ON 5/7/2025] HYDROcodone-acetaminophen (NORCO)  mg per tablet Take 1 tablet by mouth every 6 (six) hours as needed for Pain. 120 tablet 0    hydrocortisone 2.5 % ointment APPLY A SMALL AMOUNT 2 TIMES DAILY       montelukast (SINGULAIR) 10 mg tablet Take 10 mg by mouth.      omeprazole (PRILOSEC) 40 MG capsule Take 40 mg by mouth every morning.       ondansetron (ZOFRAN-ODT) 8 MG TbDL Take 8 mg by mouth every 6 (six) hours as needed.      pantoprazole (PROTONIX) 40 MG tablet Take 1 tablet (40 mg total) by mouth daily      polyethylene glycol (GLYCOLAX) 17 gram/dose powder Take 17 g by mouth once daily. (Patient not taking: Reported on 3/17/2025)      pregabalin (LYRICA) 75 MG capsule Take 1 capsule (75 mg total) by mouth 2 (two) times daily. 60 capsule 6    risankizumab-rzaa (SKYRIZI) 150 mg/mL PnIj Inject 150 mg into the skin every 28 days. 1 mL 1    risankizumab-rzaa (SKYRIZI) 150 mg/mL PnIj Inject 150 mg into the skin every 12 weeks. 1 mL 4    RSV, preF A and preF B,PF, (ABRYSVO) 120 mcg/0.5 mL SolR vaccine Inject 0.5 mLs (120 mcg total) into the muscle once. for 1 dose 0.5 mL 0    sodium chloride (OCEAN) 0.65 % nasal spray 2 drops in each nostril as needed      tadalafiL (CIALIS) 5 MG tablet Take 5 mg by mouth once daily. (Patient not taking: Reported on 3/17/2025)      triamcinolone acetonide 0.1% (KENALOG) 0.1 % ointment Apply topically 2 (two) times daily. 80 g 3     No current facility-administered medications for this visit.       Family History   Problem Relation Name Age of Onset    Anesthesia problems Neg Hx      Clotting disorder Neg Hx         Social History     Socioeconomic History    Marital status:    Tobacco Use    Smoking status: Never    Smokeless tobacco: Current    Tobacco comments:     occassional dip   Substance and Sexual Activity    Alcohol use: No    Drug use: No     Social Drivers of Health     Financial Resource Strain: Low Risk  (3/3/2025)    Overall Financial Resource Strain (CARDIA)     Difficulty of Paying Living Expenses: Not hard at all   Food Insecurity: No Food Insecurity (3/3/2025)    Hunger Vital Sign     Worried About Running Out of Food in the Last Year: Never true     Ran Out  of Food in the Last Year: Never true   Physical Activity: Insufficiently Active (3/3/2025)    Exercise Vital Sign     Days of Exercise per Week: 3 days     Minutes of Exercise per Session: 20 min   Stress: No Stress Concern Present (3/3/2025)    [employer] - Occupational Stress Questionnaire     Feeling of Stress : Not at all   Housing Stability: Unknown (3/3/2025)    Housing Stability Vital Sign     Unable to Pay for Housing in the Last Year: No         Objective:    BP (!) 157/87 (BP Location: Right arm, Patient Position: Sitting)   Pulse 81   Wt 98.2 kg (216 lb 7.9 oz)   BMI 27.06 kg/m²   Physical Exam  Constitutional:       Appearance: Normal appearance.   HENT:      Head: Normocephalic and atraumatic.   Eyes:      Extraocular Movements: Extraocular movements intact.   Cardiovascular:      Rate and Rhythm: Normal rate.   Pulmonary:      Effort: Pulmonary effort is normal.   Abdominal:      General: Abdomen is flat.   Musculoskeletal:         General: Normal range of motion.      Right knee: No effusion.      Left knee: No effusion.   Skin:     General: Skin is warm and dry.   Neurological:      General: No focal deficit present.      Mental Status: He is alert and oriented to person, place, and time. Mental status is at baseline.   Psychiatric:         Mood and Affect: Mood normal.         Behavior: Behavior normal.         Thought Content: Thought content normal.        Right Knee Exam     Tenderness   The patient is experiencing tenderness in the lateral joint line and medial joint line.    Range of Motion   Extension:  normal     Other   Erythema: absent  Scars: absent  Sensation: normal  Pulse: present  Swelling: none  Effusion: no effusion present      Left Knee Exam     Tenderness   The patient is experiencing tenderness in the lateral joint line and medial joint line.    Range of Motion   Extension:  normal     Other   Erythema: absent  Scars: absent  Sensation:  normal  Pulse: present  Swelling: none  Effusion: no effusion present      Right Shoulder Exam     Muscle Strength   The patient has normal right shoulder strength.  Abduction: 4/5   Internal rotation: 5/5   External rotation: 5/5   Supraspinatus: 4/5   Subscapularis: 5/5   Biceps: 4/5     Tests   [place] test: positive  Impingement: positive  Sulcus: absent    Other   Erythema: absent  Scars: absent  Sensation: normal  Pulse: present    Comments:  + jobes  Negative [place]      Left Shoulder Exam     Muscle Strength   Abduction: 4/5   Internal rotation: 5/5   External rotation: 5/5   Supraspinatus: 4/5   Subscapularis: 5/5   Biceps: 5/5     Tests   [place] test: positive  Impingement: positive  Sulcus: present    Other   Erythema: absent  Scars: present  Sensation: normal  Pulse: present     Comments:  + jobes  Mildly positive [place]               Assessment:       ICD-10-CM ICD-9-CM    1. Shoulder impingement syndrome, left  M75.42 726.2 ULS US Guidance for Needle Placement      Large Joint Aspiration/Injection: bilateral subacromial bursa      2. Shoulder impingement syndrome, right  M75.41 726.2 ULS US Guidance for Needle Placement      Large Joint Aspiration/Injection: bilateral subacromial bursa      3. Primary osteoarthritis of both knees  M17.0 715.16       4. Trochanteric bursitis of both hips  M70.61 726.5     M70.62                  Plan:   1. Shoulder impingement syndrome, left  Assessment & Plan:  Continue home exercise program  Repeat bilateral SAB injections with corticosteroid.  Tolerated well.  See procedure note for details.  If insufficient relief with previous injection, consider a left glenohumeral joint injection at next visit.    Orders:  -     ULS US Guidance for Needle Placement  -     Large Joint Aspiration/Injection: bilateral subacromial bursa    2. Shoulder impingement syndrome, right  -     ULS US Guidance for Needle Placement  -     Large Joint Aspiration/Injection: bilateral  subacromial bursa    3. Primary osteoarthritis of both knees  Assessment & Plan:  Knees are doing well following bilateral Durolane injections   Return to clinic after 9/17/25 for repeat when needed.      4. Trochanteric bursitis of both hips  Assessment & Plan:  Return to clinic in 6 weeks for bilateral GTB injections with steroid.        Beny Archibald MD  Physical Medicine & Rehabilitation     Disclaimer:  This note may have been prepared using voice recognition software, it may have not been extensively proofed, as such there could be errors within the text such as sound alike errors.  Contact the author of this note for clarification.

## 2025-05-05 NOTE — ASSESSMENT & PLAN NOTE
Continue home exercise program  Repeat bilateral SAB injections with corticosteroid.  Tolerated well.  See procedure note for details.  If insufficient relief with previous injection, consider a left glenohumeral joint injection at next visit.

## 2025-05-29 DIAGNOSIS — M51.361 DEGENERATION OF INTERVERTEBRAL DISC OF LUMBAR REGION WITH LOWER EXTREMITY PAIN: ICD-10-CM

## 2025-05-29 DIAGNOSIS — G89.4 CHRONIC PAIN SYNDROME: ICD-10-CM

## 2025-05-29 DIAGNOSIS — L40.50 PSA (PSORIATIC ARTHRITIS): ICD-10-CM

## 2025-05-29 DIAGNOSIS — Z79.899 HIGH RISK MEDICATION USE: ICD-10-CM

## 2025-05-29 DIAGNOSIS — F51.01 PRIMARY INSOMNIA: ICD-10-CM

## 2025-05-29 DIAGNOSIS — M54.16 LUMBAR RADICULOPATHY: ICD-10-CM

## 2025-05-31 RX ORDER — ESZOPICLONE 3 MG/1
TABLET, FILM COATED ORAL
Qty: 30 TABLET | Refills: 4 | Status: SHIPPED | OUTPATIENT
Start: 2025-05-31

## 2025-05-31 RX ORDER — PREGABALIN 75 MG/1
75 CAPSULE ORAL 2 TIMES DAILY
Qty: 60 CAPSULE | Refills: 5 | Status: SHIPPED | OUTPATIENT
Start: 2025-05-31 | End: 2025-11-29

## 2025-06-02 DIAGNOSIS — L40.50 PSA (PSORIATIC ARTHRITIS): ICD-10-CM

## 2025-06-03 RX ORDER — CELECOXIB 200 MG/1
200 CAPSULE ORAL DAILY
Qty: 30 CAPSULE | Refills: 6 | Status: SHIPPED | OUTPATIENT
Start: 2025-06-03

## 2025-06-16 ENCOUNTER — CLINICAL SUPPORT (OUTPATIENT)
Dept: PHYSICAL MEDICINE AND REHAB | Facility: CLINIC | Age: 70
End: 2025-06-16
Payer: MEDICARE

## 2025-06-16 VITALS — HEART RATE: 104 BPM | SYSTOLIC BLOOD PRESSURE: 126 MMHG | DIASTOLIC BLOOD PRESSURE: 68 MMHG

## 2025-06-16 DIAGNOSIS — M70.61 TROCHANTERIC BURSITIS OF BOTH HIPS: ICD-10-CM

## 2025-06-16 DIAGNOSIS — M17.0 PRIMARY OSTEOARTHRITIS OF BOTH KNEES: ICD-10-CM

## 2025-06-16 DIAGNOSIS — M70.62 TROCHANTERIC BURSITIS OF BOTH HIPS: ICD-10-CM

## 2025-06-16 DIAGNOSIS — M75.42 SHOULDER IMPINGEMENT SYNDROME, LEFT: Primary | ICD-10-CM

## 2025-06-16 PROCEDURE — 99999 PR PBB SHADOW E&M-EST. PATIENT-LVL III: CPT | Mod: PBBFAC,,, | Performed by: STUDENT IN AN ORGANIZED HEALTH CARE EDUCATION/TRAINING PROGRAM

## 2025-06-16 RX ORDER — AMLODIPINE BESYLATE AND OLMESARTAN MEDOXOMIL 5; 20 MG/1; MG/1
1 TABLET ORAL DAILY
COMMUNITY

## 2025-06-16 RX ADMIN — METHYLPREDNISOLONE ACETATE 40 MG: 40 INJECTION, SUSPENSION INTRA-ARTICULAR; INTRALESIONAL; INTRAMUSCULAR; SOFT TISSUE at 09:06

## 2025-06-16 NOTE — PROGRESS NOTES
PHYSICAL MEDICINE AND REHABILITATION  Follow up visit:    Subjective:   Chief Complaint:    Chief Complaint   Patient presents with    Hip Pain     Both     Shoulder Pain     Both      Interval note on 06/16/2025:  Patient arrives today for scheduled follow up for both hips. Requesting repeat GTB injections.    Review of Systems  Weakness and joint swelling    Imaging/Diagnostic Studies  XR SHOULDER COMPLETE 2 OR MORE VIEWS RIGHT  Order: [id number]  Narrative  Final Impression    4 views right shoulder: No evidence of acute bony finding.  No obvious  sign of fracture or dislocation.  Mild acromioclavicular joint  hypertrophy.  No significant glenohumeral joint wear, age-appropriate.    Subacromial spurring    Joey Ghosh MD 3/6/2023 12:13 PM  ====    XR SHOULDER 2+ VIEW LEFT    History of left shoulder pain. 4 views.    There is moderate AC joint arthropathy. There is moderate glenohumeral osteoarthritis.  No acute abnormality noted.  Exam End: 07/17/23 08:34 Last Resulted: 07/17/23 08:52   ===  Exam:  XR KNEE 3 VIEW RIGHT  Date of Exam:  4/22/2024 10:29 AM  Clinical Indication: , pain  Comparison:  None.  Technique:  XR KNEE 3 VIEW RIGHT  Findings:  No acute fracture or malalignment. No joint effusion.  Moderate knee degenerative changes.  Impression:  Moderately degenerative changes.  Exam End: 04/22/24 10:55 Last Resulted: 04/22/24 14:36     Past Medical History:   Diagnosis Date    Arthritis     Back pain      Past Surgical History:   Procedure Laterality Date    FOOT SURGERY      JOINT REPLACEMENT  4/18/2012    left hip    SINUS SURGERY       Review of patient's allergies indicates:   Allergen Reactions    Lisinopril Other (See Comments)    Penicillins Nausea And Vomiting and Other (See Comments)    Ampicillin     Hydrocodone     Penicillin      Current Outpatient Medications   Medication Sig Dispense Refill    aluminum & magnesium hydroxide-simethicone (MYLANTA MAX STRENGTH) 400-400-40 mg/5 mL suspension  Take 5 mLs by mouth every 6 (six) hours as needed for Indigestion.      amlodipine-olmesartan (DEMETRA) 5-20 mg per tablet Take 1 tablet by mouth once daily.      anastrozole (ARIMIDEX) 1 mg Tab Take 1 mg by mouth.      calcipotriene (DOVONOX) 0.005 % cream Apply topically 2 (two) times daily. 120 g 3    celecoxib (CELEBREX) 200 MG capsule Take 1 capsule (200 mg total) by mouth once daily. 30 capsule 6    cetirizine-pseudoephedrine 5-120 mg Tb12 1 tablet (Patient not taking: Reported on 3/17/2025)      clindamycin (CLEOCIN T) 1 % external solution Apply topically 2 (two) times daily. 60 mL 5    clobetasol 0.05% (TEMOVATE) 0.05 % Oint Apply topically 2 (two) times daily. 45 g 4    cyclobenzaprine (FLEXERIL) 10 MG tablet Take 10 mg by mouth 3 (three) times daily.      dorzolamide (TRUSOPT) 2 % ophthalmic solution Place 1 drop into both eyes 2 (two) times daily.      eszopiclone (LUNESTA) 3 mg Tab TAKE 1 TABLET BY MOUTH NIGHTLY AS NEEDED 30 tablet 4    famotidine (PEPCID) 40 MG tablet 90 tablets. (Patient not taking: Reported on 3/17/2025)      fluticasone propionate (FLONASE) 50 mcg/actuation nasal spray fluticasone propionate 50 mcg/actuation nasal spray,suspension      HYDROcodone-acetaminophen (NORCO)  mg per tablet Take 1 tablet by mouth every 6 (six) hours as needed for Pain. 120 tablet 0    HYDROcodone-acetaminophen (NORCO)  mg per tablet Take 1 tablet by mouth every 6 (six) hours as needed for Pain. 120 tablet 0    hydrocortisone 2.5 % ointment APPLY A SMALL AMOUNT 2 TIMES DAILY      montelukast (SINGULAIR) 10 mg tablet Take 10 mg by mouth.      omeprazole (PRILOSEC) 40 MG capsule Take 40 mg by mouth every morning.       ondansetron (ZOFRAN-ODT) 8 MG TbDL Take 8 mg by mouth every 6 (six) hours as needed.      pantoprazole (PROTONIX) 40 MG tablet Take 1 tablet (40 mg total) by mouth daily      polyethylene glycol (GLYCOLAX) 17 gram/dose powder Take 17 g by mouth once daily. (Patient not taking: Reported  on 3/17/2025)      pregabalin (LYRICA) 75 MG capsule Take 1 capsule (75 mg total) by mouth 2 (two) times daily. 60 capsule 5    risankizumab-rzaa (SKYRIZI) 150 mg/mL PnIj Inject 150 mg into the skin every 28 days. 1 mL 1    risankizumab-rzaa (SKYRIZI) 150 mg/mL PnIj Inject 150 mg into the skin every 12 weeks. 1 mL 4    RSV, preF A and preF B,PF, (ABRYSVO) 120 mcg/0.5 mL SolR vaccine Inject 0.5 mLs (120 mcg total) into the muscle once. for 1 dose 0.5 mL 0    sodium chloride (OCEAN) 0.65 % nasal spray 2 drops in each nostril as needed      tadalafiL (CIALIS) 5 MG tablet Take 5 mg by mouth once daily. (Patient not taking: Reported on 3/17/2025)      triamcinolone acetonide 0.1% (KENALOG) 0.1 % ointment Apply topically 2 (two) times daily. 80 g 3     No current facility-administered medications for this visit.       Family History   Problem Relation Name Age of Onset    Anesthesia problems Neg Hx      Clotting disorder Neg Hx         Social History     Socioeconomic History    Marital status:    Tobacco Use    Smoking status: Never    Smokeless tobacco: Current    Tobacco comments:     occassional dip   Substance and Sexual Activity    Alcohol use: No    Drug use: No     Social Drivers of Health     Financial Resource Strain: Low Risk  (3/3/2025)    Overall Financial Resource Strain (CARDIA)     Difficulty of Paying Living Expenses: Not hard at all   Food Insecurity: No Food Insecurity (3/3/2025)    Hunger Vital Sign     Worried About Running Out of Food in the Last Year: Never true     Ran Out of Food in the Last Year: Never true   Physical Activity: Insufficiently Active (3/3/2025)    Exercise Vital Sign     Days of Exercise per Week: 3 days     Minutes of Exercise per Session: 20 min   Stress: No Stress Concern Present (3/3/2025)    Citizen of [place] of Occupational Health - Occupational Stress Questionnaire     Feeling of Stress : Not at all   Housing Stability: Unknown (3/3/2025)    Housing Stability Vital  Sign     Unable to Pay for Housing in the Last Year: No         Objective:    /68 (BP Location: Left arm, Patient Position: Sitting)   Pulse 104   Physical Exam  Constitutional:       Appearance: Normal appearance.   HENT:      Head: Normocephalic and atraumatic.   Eyes:      Extraocular Movements: Extraocular movements intact.   Cardiovascular:      Rate and Rhythm: Normal rate.   Pulmonary:      Effort: Pulmonary effort is normal.   Abdominal:      General: Abdomen is flat.   Musculoskeletal:         General: Normal range of motion.      Right knee: No effusion.      Left knee: No effusion.   Skin:     General: Skin is warm and dry.   Neurological:      General: No focal deficit present.      Mental Status: He is alert and oriented to person, place, and time. Mental status is at baseline.   Psychiatric:         Mood and Affect: Mood normal.         Behavior: Behavior normal.         Thought Content: Thought content normal.        Right Knee Exam     Tenderness   The patient is experiencing tenderness in the lateral joint line and medial joint line.    Range of Motion   Extension:  normal     Other   Erythema: absent  Scars: absent  Sensation: normal  Pulse: present  Swelling: none  Effusion: no effusion present      Left Knee Exam     Tenderness   The patient is experiencing tenderness in the lateral joint line and medial joint line.    Range of Motion   Extension:  normal     Other   Erythema: absent  Scars: absent  Sensation: normal  Pulse: present  Swelling: none  Effusion: no effusion present      Right Shoulder Exam     Muscle Strength   The patient has normal right shoulder strength.  Abduction: 4/5   Internal rotation: 5/5   External rotation: 5/5   Supraspinatus: 4/5   Subscapularis: 5/5   Biceps: 4/5     Tests   Maravilla test: positive  Impingement: positive  Sulcus: absent    Other   Erythema: absent  Scars: absent  Sensation: normal  Pulse: present    Comments:  + jobes  Negative  [place]'s      Left Shoulder Exam     Muscle Strength   Abduction: 4/5   Internal rotation: 5/5   External rotation: 5/5   Supraspinatus: 4/5   Subscapularis: 5/5   Biceps: 5/5     Tests   Maravilla test: positive  Impingement: positive  Sulcus: present    Other   Erythema: absent  Scars: present  Sensation: normal  Pulse: present     Comments:  + jobes  Mildly positive [place]'s           Assessment:       ICD-10-CM ICD-9-CM    1. Shoulder impingement syndrome, left  M75.42 726.2 ULS US Guidance for Needle Placement      2. Trochanteric bursitis of both hips  M70.61 726.5 ULS US Guidance for Needle Placement    M70.62  Bilateral greater trochanteric bursa injections      3. Primary osteoarthritis of both knees  M17.0 715.16 Prior authorization Order          Plan:   1. Shoulder impingement syndrome, left  -     ULS US Guidance for Needle Placement    2. Trochanteric bursitis of both hips  Assessment & Plan:  Repeat BL GTB injections with steroid. Tolerated well. See procedure note for details.     Orders:  -     ULS US Guidance for Needle Placement  -     Bilateral greater trochanteric bursa injections    3. Primary osteoarthritis of both knees  -     Prior authorization Order      Beny Archibald MD  Physical Medicine & Rehabilitation     Disclaimer:  This note may have been prepared using voice recognition software, it may have not been extensively proofed, as such there could be errors within the text such as sound alike errors.  Contact the author of this note for clarification.

## 2025-06-18 RX ORDER — METHYLPREDNISOLONE ACETATE 40 MG/ML
40 INJECTION, SUSPENSION INTRA-ARTICULAR; INTRALESIONAL; INTRAMUSCULAR; SOFT TISSUE
Status: DISCONTINUED | OUTPATIENT
Start: 2025-06-16 | End: 2025-06-18 | Stop reason: HOSPADM

## 2025-06-18 NOTE — PROCEDURES
Bilateral greater trochanteric bursa injections    Date/Time: 6/16/2025 9:00 AM    Performed by: Beny Archibald MD  Authorized by: Beny Archibald MD    Consent Done?:  Yes (Verbal)  Timeout: prior to procedure the correct patient, procedure, and site was verified    Indications:  Diagnostic evaluation and pain  Site marked: the procedure site was marked    Timeout: prior to procedure the correct patient, procedure, and site was verified    Location:  Hip  Hip joint: BILATERAL GT bursa/glute tendon.  Prep: patient was prepped and draped in usual sterile fashion    Ultrasonic Guidance for Needle Placement?: Yes    Needle size:  22 G  Approach:  Posterolateral  Medications:  40 mg methylPREDNISolone acetate 40 mg/mL; 40 mg methylPREDNISolone acetate 40 mg/mL  Patient tolerance:  Patient tolerated the procedure well with no immediate complications   Ultrasound guidance was used for correct needle placement, the images were saved will be uploaded to EMR.

## 2025-07-10 DIAGNOSIS — G89.4 CHRONIC PAIN SYNDROME: ICD-10-CM

## 2025-07-10 DIAGNOSIS — L40.50 PSA (PSORIATIC ARTHRITIS): ICD-10-CM

## 2025-07-14 RX ORDER — HYDROCODONE BITARTRATE AND ACETAMINOPHEN 10; 325 MG/1; MG/1
TABLET ORAL
Qty: 120 TABLET | Refills: 0 | Status: SHIPPED | OUTPATIENT
Start: 2025-09-13

## 2025-07-14 RX ORDER — HYDROCODONE BITARTRATE AND ACETAMINOPHEN 10; 325 MG/1; MG/1
TABLET ORAL
Qty: 120 TABLET | Refills: 0 | Status: SHIPPED | OUTPATIENT
Start: 2025-07-14

## 2025-07-14 RX ORDER — HYDROCODONE BITARTRATE AND ACETAMINOPHEN 10; 325 MG/1; MG/1
TABLET ORAL
Qty: 120 TABLET | Refills: 0 | Status: SHIPPED | OUTPATIENT
Start: 2025-08-13

## 2025-08-04 ENCOUNTER — CLINICAL SUPPORT (OUTPATIENT)
Dept: PHYSICAL MEDICINE AND REHAB | Facility: CLINIC | Age: 70
End: 2025-08-04
Payer: MEDICARE

## 2025-08-04 VITALS
WEIGHT: 222.69 LBS | HEIGHT: 75 IN | SYSTOLIC BLOOD PRESSURE: 127 MMHG | HEART RATE: 88 BPM | DIASTOLIC BLOOD PRESSURE: 70 MMHG | BODY MASS INDEX: 27.69 KG/M2

## 2025-08-04 DIAGNOSIS — M70.61 TROCHANTERIC BURSITIS OF BOTH HIPS: ICD-10-CM

## 2025-08-04 DIAGNOSIS — M75.42 SHOULDER IMPINGEMENT SYNDROME, LEFT: ICD-10-CM

## 2025-08-04 DIAGNOSIS — M70.62 TROCHANTERIC BURSITIS OF BOTH HIPS: ICD-10-CM

## 2025-08-04 DIAGNOSIS — M75.41 SHOULDER IMPINGEMENT SYNDROME, RIGHT: Primary | ICD-10-CM

## 2025-08-04 DIAGNOSIS — M17.0 PRIMARY OSTEOARTHRITIS OF BOTH KNEES: ICD-10-CM

## 2025-08-04 PROCEDURE — 99214 OFFICE O/P EST MOD 30 MIN: CPT | Mod: 25,S$GLB,, | Performed by: STUDENT IN AN ORGANIZED HEALTH CARE EDUCATION/TRAINING PROGRAM

## 2025-08-04 PROCEDURE — 20611 DRAIN/INJ JOINT/BURSA W/US: CPT | Mod: 50,S$GLB,, | Performed by: STUDENT IN AN ORGANIZED HEALTH CARE EDUCATION/TRAINING PROGRAM

## 2025-08-04 PROCEDURE — 99999 PR PBB SHADOW E&M-EST. PATIENT-LVL IV: CPT | Mod: PBBFAC,,, | Performed by: STUDENT IN AN ORGANIZED HEALTH CARE EDUCATION/TRAINING PROGRAM

## 2025-08-04 RX ORDER — METHYLPREDNISOLONE ACETATE 40 MG/ML
40 INJECTION, SUSPENSION INTRA-ARTICULAR; INTRALESIONAL; INTRAMUSCULAR; SOFT TISSUE
Status: DISCONTINUED | OUTPATIENT
Start: 2025-08-04 | End: 2025-08-04 | Stop reason: HOSPADM

## 2025-08-04 RX ADMIN — METHYLPREDNISOLONE ACETATE 40 MG: 40 INJECTION, SUSPENSION INTRA-ARTICULAR; INTRALESIONAL; INTRAMUSCULAR; SOFT TISSUE at 08:08

## 2025-08-04 NOTE — PROCEDURES
Large Joint Aspiration/Injection: bilateral subacromial bursa    Date/Time: 8/4/2025 8:00 AM    Performed by: Beny Archibald MD  Authorized by: Beny Archibald MD    Site marked: the procedure site was marked    Timeout: prior to procedure the correct patient, procedure, and site was verified    Prep: patient was prepped and draped in usual sterile fashion    Local anesthesia used?: No      Details:  Needle Size:  25 G  Ultrasonic Guidance for needle placement?: Yes    Images are saved and documented.  Approach:  Posterior  Location:  Shoulder  Laterality:  Bilateral  Site:  Bilateral subacromial bursa  Medications (Right):  40 mg methylPREDNISolone acetate 40 mg/mL  Medications (Left):  40 mg methylPREDNISolone acetate 40 mg/mL  Patient tolerance:  Patient tolerated the procedure well with no immediate complications      Ultrasound images saved in the patient's chart.

## 2025-08-04 NOTE — PROGRESS NOTES
PHYSICAL MEDICINE AND REHABILITATION  Follow up visit:    Subjective:   Chief Complaint:    Chief Complaint   Patient presents with    Bilateral shoulder injection    Shoulder Pain     bilateral     Interval note on 08/04/2025:  Patient arrives today for scheduled follow up and bilateral shoulder injections.  Due for bilateral Durolane injections on 09/17/25.  Responded favorably to previous GTB injections on 06/16/2025.  Both his knees and his hips are doing well today.  Shoulder pain has returned in similar character and distribution.    Review of Systems  Weakness and joint swelling    Imaging/Diagnostic Studies  XR SHOULDER COMPLETE 2 OR MORE VIEWS RIGHT  Order: [id number]  Narrative  Final Impression    4 views right shoulder: No evidence of acute bony finding.  No obvious  sign of fracture or dislocation.  Mild acromioclavicular joint  hypertrophy.  No significant glenohumeral joint wear, age-appropriate.    Subacromial spurring    Joey Ghosh MD 3/6/2023 12:13 PM  ====    XR SHOULDER 2+ VIEW LEFT    History of left shoulder pain. 4 views.    There is moderate AC joint arthropathy. There is moderate glenohumeral osteoarthritis.  No acute abnormality noted.  Exam End: 07/17/23 08:34 Last Resulted: 07/17/23 08:52   ===  Exam:  XR KNEE 3 VIEW RIGHT  Date of Exam:  4/22/2024 10:29 AM  Clinical Indication: , pain  Comparison:  None.  Technique:  XR KNEE 3 VIEW RIGHT  Findings:  No acute fracture or malalignment. No joint effusion.  Moderate knee degenerative changes.  Impression:  Moderately degenerative changes.  Exam End: 04/22/24 10:55 Last Resulted: 04/22/24 14:36     Past Medical History:   Diagnosis Date    Arthritis     Back pain     Hypertension      Past Surgical History:   Procedure Laterality Date    ANGIOGRAM, CORONARY, WITH LEFT HEART CATHETERIZATION  7/24/2025    Procedure: Left Heart Cath;  Surgeon: Kev Covington MD;  Location: [facility] CATH;  Service: Cardiology;;    BACK SURGERY      FOOT SURGERY       FUSION, SPINE, CERVICAL, ANTERIOR APPROACH      JOINT REPLACEMENT Bilateral 04/18/2012    left hip    SINUS SURGERY       Review of patient's allergies indicates:   Allergen Reactions    Lisinopril Other (See Comments)    Penicillins Nausea And Vomiting and Other (See Comments)    Ampicillin     Penicillin      Current Outpatient Medications   Medication Sig Dispense Refill    aluminum & magnesium hydroxide-simethicone (MYLANTA MAX STRENGTH) 400-400-40 mg/5 mL suspension Take 5 mLs by mouth every 6 (six) hours as needed for Indigestion.      amlodipine-olmesartan (DEMETRA) 5-20 mg per tablet Take 1 tablet by mouth once daily.      anastrozole (ARIMIDEX) 1 mg Tab Take 1 mg by mouth once daily.      aspirin (ELAINE LOW DOSE ASPIRIN) 81 MG EC tablet once daily.      celecoxib (CELEBREX) 200 MG capsule Take 1 capsule (200 mg total) by mouth once daily. 30 capsule 6    cyclobenzaprine (FLEXERIL) 10 MG tablet Take 10 mg by mouth 3 (three) times daily.      dorzolamide (TRUSOPT) 2 % ophthalmic solution Place 1 drop into both eyes 2 (two) times daily.      eszopiclone (LUNESTA) 3 mg Tab TAKE 1 TABLET BY MOUTH NIGHTLY AS NEEDED 30 tablet 4    fluticasone propionate (FLONASE) 50 mcg/actuation nasal spray fluticasone propionate 50 mcg/actuation nasal spray,suspension      HYDROcodone-acetaminophen (NORCO)  mg per tablet Take 1 tablet by mouth every 6 (six) hours as needed for Pain. 120 tablet 0    HYDROcodone-acetaminophen (NORCO)  mg per tablet Take 1 tablet by mouth every 6 (six) hours as needed for Pain. 120 tablet 0    [START ON 8/13/2025] HYDROcodone-acetaminophen (NORCO)  mg per tablet Take 1 tablet by mouth every 6 (six) hours as needed for Pain. 120 tablet 0    [START ON 9/13/2025] HYDROcodone-acetaminophen (NORCO)  mg per tablet Take 1 tablet by mouth every 6 (six) hours as needed for Pain. 120 tablet 0    montelukast (SINGULAIR) 10 mg tablet Take 10 mg by mouth once daily.      omeprazole  (PRILOSEC) 40 MG capsule Take 40 mg by mouth every morning.       ondansetron (ZOFRAN-ODT) 8 MG TbDL Take 8 mg by mouth every 6 (six) hours as needed.      pantoprazole (PROTONIX) 40 MG tablet Take 1 tablet (40 mg total) by mouth daily      pregabalin (LYRICA) 75 MG capsule Take 1 capsule (75 mg total) by mouth 2 (two) times daily. 60 capsule 5    risankizumab-rzaa (SKYRIZI) 150 mg/mL PnIj Inject 150 mg into the skin every 28 days. 1 mL 1    risankizumab-rzaa (SKYRIZI) 150 mg/mL PnIj Inject 150 mg into the skin every 12 weeks. 1 mL 4    sodium chloride (OCEAN) 0.65 % nasal spray 2 drops in each nostril as needed      tadalafiL (CIALIS) 5 MG tablet Take 5 mg by mouth once daily.       No current facility-administered medications for this visit.       Family History   Problem Relation Name Age of Onset    Anesthesia problems Neg Hx      Clotting disorder Neg Hx         Social History     Socioeconomic History    Marital status:    Tobacco Use    Smoking status: Never    Smokeless tobacco: Former    Tobacco comments:     occassional dip   Vaping Use    Vaping status: Some Days    Substances: THC   Substance and Sexual Activity    Alcohol use: No    Drug use: Yes     Types: Marijuana     Social Drivers of Health     Financial Resource Strain: Low Risk  (3/3/2025)    Overall Financial Resource Strain (CARDIA)     Difficulty of Paying Living Expenses: Not hard at all   Food Insecurity: No Food Insecurity (3/3/2025)    Hunger Vital Sign     Worried About Running Out of Food in the Last Year: Never true     Ran Out of Food in the Last Year: Never true   Physical Activity: Insufficiently Active (3/3/2025)    Exercise Vital Sign     Days of Exercise per Week: 3 days     Minutes of Exercise per Session: 20 min   Stress: No Stress Concern Present (3/3/2025)    [place] of Occupational Health - Occupational Stress Questionnaire     Feeling of Stress : Not at all   Housing Stability: Unknown (3/3/2025)    Housing  "Stability Vital Sign     Unable to Pay for Housing in the Last Year: No         Objective:    /70 (BP Location: Left arm)   Pulse 88   Ht 6' 3" (1.905 m)   Wt 101 kg (222 lb 10.6 oz)   BMI 27.83 kg/m²   Physical Exam  Constitutional:       Appearance: Normal appearance.   HENT:      Head: Normocephalic and atraumatic.   Eyes:      Extraocular Movements: Extraocular movements intact.   Cardiovascular:      Rate and Rhythm: Normal rate.   Pulmonary:      Effort: Pulmonary effort is normal.   Abdominal:      General: Abdomen is flat.   Musculoskeletal:         General: Normal range of motion.      Right knee: No effusion.      Left knee: No effusion.   Skin:     General: Skin is warm and dry.   Neurological:      General: No focal deficit present.      Mental Status: He is alert and oriented to person, place, and time. Mental status is at baseline.   Psychiatric:         Mood and Affect: Mood normal.         Behavior: Behavior normal.         Thought Content: Thought content normal.        Right Knee Exam     Tenderness   The patient is experiencing tenderness in the lateral joint line and medial joint line.    Range of Motion   Extension:  normal     Other   Erythema: absent  Scars: absent  Sensation: normal  Pulse: present  Swelling: none  Effusion: no effusion present      Left Knee Exam     Tenderness   The patient is experiencing tenderness in the lateral joint line and medial joint line.    Range of Motion   Extension:  normal     Other   Erythema: absent  Scars: absent  Sensation: normal  Pulse: present  Swelling: none  Effusion: no effusion present      Right Shoulder Exam     Muscle Strength   The patient has normal right shoulder strength.  Abduction: 4/5   Internal rotation: 5/5   External rotation: 5/5   Supraspinatus: 4/5   Subscapularis: 5/5   Biceps: 4/5     Tests   Maravilla test: positive  Impingement: positive  Sulcus: absent    Other   Erythema: absent  Scars: absent  Sensation: " normal  Pulse: present    Comments:  + jobes  Negative [place]'s      Left Shoulder Exam     Muscle Strength   Abduction: 4/5   Internal rotation: 5/5   External rotation: 5/5   Supraspinatus: 4/5   Subscapularis: 5/5   Biceps: 5/5     Tests   Maravilla test: positive  Impingement: positive  Sulcus: present    Other   Erythema: absent  Scars: present  Sensation: normal  Pulse: present     Comments:  + jobes  Mildly positive [place]'s           Assessment:       ICD-10-CM ICD-9-CM    1. Shoulder impingement syndrome, right  M75.41 726.2 Large Joint Aspiration/Injection: bilateral subacromial bursa      ULS US Guidance for Needle Placement      2. Shoulder impingement syndrome, left  M75.42 726.2 Large Joint Aspiration/Injection: bilateral subacromial bursa      ULS US Guidance for Needle Placement      3. Primary osteoarthritis of both knees  M17.0 715.16 Prior authorization Order      4. Trochanteric bursitis of both hips  M70.61 726.5     M70.62          Plan:   1. Shoulder impingement syndrome, right  -     Large Joint Aspiration/Injection: bilateral subacromial bursa  -     ULS US Guidance for Needle Placement    2. Shoulder impingement syndrome, left  Assessment & Plan:  Continue home exercise program  Repeat bilateral SAB injections with corticosteroid.  Tolerated well.  See procedure note for details.  If insufficient relief with previous injection, consider a left glenohumeral joint injection at next visit.    Orders:  -     Large Joint Aspiration/Injection: bilateral subacromial bursa  -     ULS US Guidance for Needle Placement    3. Primary osteoarthritis of both knees  -     Prior authorization Order    4. Trochanteric bursitis of both hips  Assessment & Plan:  Has resolved since previous injections.        Beny Archibald MD  Physical Medicine & Rehabilitation     Disclaimer:  This note may have been prepared using voice recognition software, it may have not been extensively proofed, as such there  could be errors within the text such as sound alike errors.  Contact the author of this note for clarification.